# Patient Record
Sex: FEMALE | Race: WHITE | Employment: FULL TIME | URBAN - METROPOLITAN AREA
[De-identification: names, ages, dates, MRNs, and addresses within clinical notes are randomized per-mention and may not be internally consistent; named-entity substitution may affect disease eponyms.]

---

## 2019-02-06 ENCOUNTER — APPOINTMENT (OUTPATIENT)
Dept: CT IMAGING | Age: 34
End: 2019-02-06

## 2019-02-06 ENCOUNTER — HOSPITAL ENCOUNTER (EMERGENCY)
Age: 34
Discharge: HOME | End: 2019-02-06
Attending: EMERGENCY MEDICINE

## 2019-02-06 VITALS
OXYGEN SATURATION: 98 % | HEART RATE: 75 BPM | SYSTOLIC BLOOD PRESSURE: 114 MMHG | RESPIRATION RATE: 16 BRPM | TEMPERATURE: 97.8 F | HEIGHT: 67 IN | WEIGHT: 282 LBS | BODY MASS INDEX: 44.26 KG/M2 | DIASTOLIC BLOOD PRESSURE: 69 MMHG

## 2019-02-06 DIAGNOSIS — R10.32 LEFT GROIN PAIN: Primary | ICD-10-CM

## 2019-02-06 LAB
ANION GAP SERPL CALCULATED.3IONS-SCNC: 10 MMOL/L (ref 7–16)
BACTERIA: ABNORMAL /HPF
BASOPHILS ABSOLUTE: 0.02 E9/L (ref 0–0.2)
BASOPHILS RELATIVE PERCENT: 0.2 % (ref 0–2)
BILIRUBIN URINE: NEGATIVE
BLOOD, URINE: NEGATIVE
BUN BLDV-MCNC: 18 MG/DL (ref 6–20)
CALCIUM SERPL-MCNC: 8.6 MG/DL (ref 8.6–10.2)
CHLORIDE BLD-SCNC: 102 MMOL/L (ref 98–107)
CLARITY: ABNORMAL
CO2: 26 MMOL/L (ref 22–29)
COLOR: YELLOW
CREAT SERPL-MCNC: 0.8 MG/DL (ref 0.5–1)
EOSINOPHILS ABSOLUTE: 0.12 E9/L (ref 0.05–0.5)
EOSINOPHILS RELATIVE PERCENT: 1.3 % (ref 0–6)
EPITHELIAL CELLS, UA: ABNORMAL /HPF
GFR AFRICAN AMERICAN: >60
GFR NON-AFRICAN AMERICAN: >60 ML/MIN/1.73
GLUCOSE BLD-MCNC: 91 MG/DL (ref 74–99)
GLUCOSE URINE: NEGATIVE MG/DL
HCG, URINE, POC: NEGATIVE
HCT VFR BLD CALC: 40.5 % (ref 34–48)
HEMOGLOBIN: 13.6 G/DL (ref 11.5–15.5)
IMMATURE GRANULOCYTES #: 0.02 E9/L
IMMATURE GRANULOCYTES %: 0.2 % (ref 0–5)
KETONES, URINE: NEGATIVE MG/DL
LACTIC ACID: 0.7 MMOL/L (ref 0.5–2.2)
LEUKOCYTE ESTERASE, URINE: NEGATIVE
LYMPHOCYTES ABSOLUTE: 2.25 E9/L (ref 1.5–4)
LYMPHOCYTES RELATIVE PERCENT: 23.9 % (ref 20–42)
Lab: NORMAL
MCH RBC QN AUTO: 29.2 PG (ref 26–35)
MCHC RBC AUTO-ENTMCNC: 33.6 % (ref 32–34.5)
MCV RBC AUTO: 86.9 FL (ref 80–99.9)
MONOCYTES ABSOLUTE: 0.5 E9/L (ref 0.1–0.95)
MONOCYTES RELATIVE PERCENT: 5.3 % (ref 2–12)
MUCUS: PRESENT
NEGATIVE QC PASS/FAIL: NORMAL
NEUTROPHILS ABSOLUTE: 6.52 E9/L (ref 1.8–7.3)
NEUTROPHILS RELATIVE PERCENT: 69.1 % (ref 43–80)
NITRITE, URINE: NEGATIVE
PDW BLD-RTO: 13.1 FL (ref 11.5–15)
PH UA: 6.5 (ref 5–9)
PLATELET # BLD: 269 E9/L (ref 130–450)
PMV BLD AUTO: 9.9 FL (ref 7–12)
POSITIVE QC PASS/FAIL: NORMAL
POTASSIUM SERPL-SCNC: 3.9 MMOL/L (ref 3.5–5)
PROTEIN UA: NEGATIVE MG/DL
RBC # BLD: 4.66 E12/L (ref 3.5–5.5)
RBC UA: ABNORMAL /HPF (ref 0–2)
SODIUM BLD-SCNC: 138 MMOL/L (ref 132–146)
SPECIFIC GRAVITY UA: 1.02 (ref 1–1.03)
UROBILINOGEN, URINE: 1 E.U./DL
WBC # BLD: 9.4 E9/L (ref 4.5–11.5)
WBC UA: ABNORMAL /HPF (ref 0–5)

## 2019-02-06 PROCEDURE — 80048 BASIC METABOLIC PNL TOTAL CA: CPT

## 2019-02-06 PROCEDURE — 81001 URINALYSIS AUTO W/SCOPE: CPT

## 2019-02-06 PROCEDURE — 83605 ASSAY OF LACTIC ACID: CPT

## 2019-02-06 PROCEDURE — 96375 TX/PRO/DX INJ NEW DRUG ADDON: CPT

## 2019-02-06 PROCEDURE — 96374 THER/PROPH/DIAG INJ IV PUSH: CPT

## 2019-02-06 PROCEDURE — 6360000002 HC RX W HCPCS: Performed by: EMERGENCY MEDICINE

## 2019-02-06 PROCEDURE — 36415 COLL VENOUS BLD VENIPUNCTURE: CPT

## 2019-02-06 PROCEDURE — 85025 COMPLETE CBC W/AUTO DIFF WBC: CPT

## 2019-02-06 PROCEDURE — 74176 CT ABD & PELVIS W/O CONTRAST: CPT

## 2019-02-06 PROCEDURE — 99283 EMERGENCY DEPT VISIT LOW MDM: CPT

## 2019-02-06 RX ORDER — SODIUM CHLORIDE 0.9 % (FLUSH) 0.9 %
10 SYRINGE (ML) INJECTION PRN
Status: DISCONTINUED | OUTPATIENT
Start: 2019-02-06 | End: 2019-02-06 | Stop reason: HOSPADM

## 2019-02-06 RX ORDER — NAPROXEN 500 MG/1
500 TABLET ORAL 2 TIMES DAILY
Qty: 28 TABLET | Refills: 0 | Status: SHIPPED | OUTPATIENT
Start: 2019-02-06 | End: 2019-10-25

## 2019-02-06 RX ORDER — ONDANSETRON 2 MG/ML
4 INJECTION INTRAMUSCULAR; INTRAVENOUS
Status: COMPLETED | OUTPATIENT
Start: 2019-02-06 | End: 2019-02-06

## 2019-02-06 RX ORDER — MORPHINE SULFATE 2 MG/ML
8 INJECTION, SOLUTION INTRAMUSCULAR; INTRAVENOUS ONCE
Status: COMPLETED | OUTPATIENT
Start: 2019-02-06 | End: 2019-02-06

## 2019-02-06 RX ADMIN — MORPHINE SULFATE 8 MG: 2 INJECTION, SOLUTION INTRAMUSCULAR; INTRAVENOUS at 14:01

## 2019-02-06 RX ADMIN — ONDANSETRON 4 MG: 2 INJECTION INTRAMUSCULAR; INTRAVENOUS at 14:01

## 2019-02-06 ASSESSMENT — PAIN DESCRIPTION - LOCATION
LOCATION: GROIN;ABDOMEN
LOCATION: GROIN
LOCATION: ABDOMEN

## 2019-02-06 ASSESSMENT — ENCOUNTER SYMPTOMS
DIARRHEA: 0
COLOR CHANGE: 0
CHEST TIGHTNESS: 0
BLOOD IN STOOL: 0
BACK PAIN: 0
ABDOMINAL PAIN: 0
SHORTNESS OF BREATH: 0
VOMITING: 0
COUGH: 0
NAUSEA: 0

## 2019-02-06 ASSESSMENT — PAIN DESCRIPTION - PROGRESSION: CLINICAL_PROGRESSION: GRADUALLY IMPROVING

## 2019-02-06 ASSESSMENT — PAIN SCALES - GENERAL
PAINLEVEL_OUTOF10: 3
PAINLEVEL_OUTOF10: 7
PAINLEVEL_OUTOF10: 7

## 2019-02-06 ASSESSMENT — PAIN DESCRIPTION - DESCRIPTORS
DESCRIPTORS: SHARP
DESCRIPTORS: DULL

## 2019-02-06 ASSESSMENT — PAIN DESCRIPTION - ORIENTATION
ORIENTATION: LEFT
ORIENTATION: LEFT

## 2019-02-06 ASSESSMENT — PAIN DESCRIPTION - FREQUENCY
FREQUENCY: CONTINUOUS
FREQUENCY: CONTINUOUS

## 2019-02-06 ASSESSMENT — PAIN DESCRIPTION - PAIN TYPE
TYPE: ACUTE PAIN
TYPE: ACUTE PAIN

## 2019-06-26 ENCOUNTER — HOSPITAL ENCOUNTER (OUTPATIENT)
Age: 34
Discharge: HOME | End: 2019-06-28

## 2019-06-26 PROCEDURE — 86787 VARICELLA-ZOSTER ANTIBODY: CPT

## 2019-06-26 PROCEDURE — 86762 RUBELLA ANTIBODY: CPT

## 2019-06-26 PROCEDURE — 86765 RUBEOLA ANTIBODY: CPT

## 2019-06-26 PROCEDURE — 86735 MUMPS ANTIBODY: CPT

## 2019-06-27 LAB
MEASLES IMMUNE (IGG): NORMAL
MUMPS AB IGG: NORMAL
RUBELLA ANTIBODY IGG: NORMAL
VARICELLA-ZOSTER VIRUS AB, IGG: NORMAL

## 2019-09-06 ENCOUNTER — OFFICE VISIT (OUTPATIENT)
Dept: FAMILY MEDICINE CLINIC | Age: 34
End: 2019-09-06

## 2019-09-06 VITALS
HEART RATE: 80 BPM | TEMPERATURE: 97.8 F | WEIGHT: 286.25 LBS | HEIGHT: 68 IN | OXYGEN SATURATION: 97 % | DIASTOLIC BLOOD PRESSURE: 76 MMHG | BODY MASS INDEX: 43.38 KG/M2 | SYSTOLIC BLOOD PRESSURE: 118 MMHG

## 2019-09-06 DIAGNOSIS — B96.89 ACUTE BACTERIAL SINUSITIS: Primary | ICD-10-CM

## 2019-09-06 DIAGNOSIS — J01.90 ACUTE BACTERIAL SINUSITIS: Primary | ICD-10-CM

## 2019-09-06 DIAGNOSIS — H66.003 NON-RECURRENT ACUTE SUPPURATIVE OTITIS MEDIA OF BOTH EARS WITHOUT SPONTANEOUS RUPTURE OF TYMPANIC MEMBRANES: ICD-10-CM

## 2019-09-06 DIAGNOSIS — H60.503 ACUTE OTITIS EXTERNA OF BOTH EARS, UNSPECIFIED TYPE: ICD-10-CM

## 2019-09-06 PROCEDURE — 99213 OFFICE O/P EST LOW 20 MIN: CPT | Performed by: FAMILY MEDICINE

## 2019-09-06 RX ORDER — CEFDINIR 300 MG/1
300 CAPSULE ORAL 2 TIMES DAILY
Qty: 20 CAPSULE | Refills: 0 | Status: SHIPPED | OUTPATIENT
Start: 2019-09-06 | End: 2019-09-16

## 2019-09-06 RX ORDER — NEOMYCIN SULFATE, POLYMYXIN B SULFATE AND HYDROCORTISONE 10; 3.5; 1 MG/ML; MG/ML; [USP'U]/ML
SUSPENSION/ DROPS AURICULAR (OTIC)
Qty: 10 ML | Refills: 0 | Status: SHIPPED | OUTPATIENT
Start: 2019-09-06 | End: 2019-12-13

## 2019-09-06 RX ORDER — FLUCONAZOLE 150 MG/1
TABLET ORAL
Qty: 2 TABLET | Refills: 0 | Status: SHIPPED | OUTPATIENT
Start: 2019-09-06 | End: 2019-10-25

## 2019-09-06 NOTE — PROGRESS NOTES
since quittin.6    Smokeless tobacco: Never Used    Tobacco comment: quit smoking 2015   Substance Use Topics    Alcohol use: No    Drug use: No      Social History     Social History Narrative    Not on file        Vitals:    19 0826   BP: 118/76   Pulse: 80   Temp: 97.8 °F (36.6 °C)   SpO2: 97%   Weight: 286 lb 4 oz (129.8 kg)   Height: 5' 8\" (1.727 m)     Wt Readings from Last 3 Encounters:   19 286 lb 4 oz (129.8 kg)   19 282 lb (127.9 kg)   18 272 lb 9.6 oz (123.7 kg)        Physical Exam    Exam:  Const: Appears comfortable. No signs of acute distress present. Head/Face: Atraumatic, normocephalic on inspection. Eyes: No discharge from the eyes. Sclerae clear. ENMT: Ears show fluid erythema bilaterally with ear canal inflation right greater than left, nose boggy oropharynx thick postnasal drainage  Neck: Supple. Palpation reveals no adenopathy. No masses appreciated. No JVD. Resp: Respirations are unlabored. Clear to auscultation bilaterally. No rales, rhonchi or wheezes appreciated over the  lungs bilaterally. CV: RRR   Extremities: No clubbing or cyanosis. No edema of the lower limbs  bilaterally. No calf inflammation or tenderness. Abdomen: Abdomen is soft, nontender, and nondistended. No abdominal masses  appreciated. No palpable hepatosplenomegaly. Bowel sounds are normoactive. Skin: Dry and warm with no rash. Muscular skeletal: No acute joint inflammation. Neuro:Grossly intact without focal deficit        Assessment and Plan:    1. Acute bacterial sinusitis  Counseled. Differential reviewed. Antibiotic as per EMR, standard precautions reviewed including C. Difficile. R/B probiotics reviewed. Mucinex as directed with precautions. Potential interactions reviewed. Proper hydration reviewed. Coolmist vaporizer as directed. Mono precautions reviewed. Counseled on nasal saline. Counseled on nasal steroid. - cefdinir (OMNICEF) 300 MG capsule;  Take 1

## 2019-10-25 ENCOUNTER — HOSPITAL ENCOUNTER (OUTPATIENT)
Age: 34
Discharge: HOME | End: 2019-10-27
Payer: COMMERCIAL

## 2019-10-25 ENCOUNTER — OFFICE VISIT (OUTPATIENT)
Dept: FAMILY MEDICINE CLINIC | Age: 34
End: 2019-10-25
Payer: COMMERCIAL

## 2019-10-25 VITALS
BODY MASS INDEX: 44.89 KG/M2 | SYSTOLIC BLOOD PRESSURE: 102 MMHG | TEMPERATURE: 97.5 F | WEIGHT: 286 LBS | HEART RATE: 68 BPM | HEIGHT: 67 IN | DIASTOLIC BLOOD PRESSURE: 68 MMHG | OXYGEN SATURATION: 97 %

## 2019-10-25 DIAGNOSIS — Z98.84 GASTRIC BYPASS STATUS FOR OBESITY: ICD-10-CM

## 2019-10-25 DIAGNOSIS — G89.29 CHRONIC PAIN OF RIGHT ANKLE: ICD-10-CM

## 2019-10-25 DIAGNOSIS — M19.90 ARTHRITIS: ICD-10-CM

## 2019-10-25 DIAGNOSIS — M25.571 CHRONIC PAIN OF RIGHT ANKLE: ICD-10-CM

## 2019-10-25 DIAGNOSIS — R53.83 FATIGUE, UNSPECIFIED TYPE: ICD-10-CM

## 2019-10-25 DIAGNOSIS — G44.221 CHRONIC TENSION-TYPE HEADACHE, INTRACTABLE: Primary | ICD-10-CM

## 2019-10-25 LAB
ALBUMIN SERPL-MCNC: 4.1 G/DL (ref 3.5–5.2)
ALP BLD-CCNC: 77 U/L (ref 35–104)
ALT SERPL-CCNC: 20 U/L (ref 0–32)
ANION GAP SERPL CALCULATED.3IONS-SCNC: 13 MMOL/L (ref 7–16)
AST SERPL-CCNC: 16 U/L (ref 0–31)
BASOPHILS ABSOLUTE: 0.02 E9/L (ref 0–0.2)
BASOPHILS RELATIVE PERCENT: 0.2 % (ref 0–2)
BILIRUB SERPL-MCNC: 0.6 MG/DL (ref 0–1.2)
BILIRUBIN DIRECT: <0.2 MG/DL (ref 0–0.3)
BILIRUBIN, INDIRECT: NORMAL MG/DL (ref 0–1)
BUN BLDV-MCNC: 20 MG/DL (ref 6–20)
CALCIUM SERPL-MCNC: 8.9 MG/DL (ref 8.6–10.2)
CHLORIDE BLD-SCNC: 103 MMOL/L (ref 98–107)
CHOLESTEROL, TOTAL: 181 MG/DL (ref 0–199)
CO2: 23 MMOL/L (ref 22–29)
CREAT SERPL-MCNC: 0.9 MG/DL (ref 0.5–1)
EOSINOPHILS ABSOLUTE: 0.05 E9/L (ref 0.05–0.5)
EOSINOPHILS RELATIVE PERCENT: 0.6 % (ref 0–6)
FOLATE: 6 NG/ML (ref 4.8–24.2)
GFR AFRICAN AMERICAN: >60
GFR NON-AFRICAN AMERICAN: >60 ML/MIN/1.73
GLUCOSE BLD-MCNC: 89 MG/DL (ref 74–99)
HCT VFR BLD CALC: 42.5 % (ref 34–48)
HDLC SERPL-MCNC: 54 MG/DL
HEMOGLOBIN: 13.7 G/DL (ref 11.5–15.5)
IMMATURE GRANULOCYTES #: 0.02 E9/L
IMMATURE GRANULOCYTES %: 0.2 % (ref 0–5)
LDL CHOLESTEROL CALCULATED: 114 MG/DL (ref 0–99)
LYMPHOCYTES ABSOLUTE: 1.76 E9/L (ref 1.5–4)
LYMPHOCYTES RELATIVE PERCENT: 20 % (ref 20–42)
MCH RBC QN AUTO: 28.5 PG (ref 26–35)
MCHC RBC AUTO-ENTMCNC: 32.2 % (ref 32–34.5)
MCV RBC AUTO: 88.5 FL (ref 80–99.9)
MONOCYTES ABSOLUTE: 0.47 E9/L (ref 0.1–0.95)
MONOCYTES RELATIVE PERCENT: 5.3 % (ref 2–12)
NEUTROPHILS ABSOLUTE: 6.5 E9/L (ref 1.8–7.3)
NEUTROPHILS RELATIVE PERCENT: 73.7 % (ref 43–80)
PDW BLD-RTO: 13.1 FL (ref 11.5–15)
PHOSPHORUS: 2.8 MG/DL (ref 2.5–4.5)
PLATELET # BLD: 253 E9/L (ref 130–450)
PMV BLD AUTO: 10.2 FL (ref 7–12)
POTASSIUM SERPL-SCNC: 3.9 MMOL/L (ref 3.5–5)
RBC # BLD: 4.8 E12/L (ref 3.5–5.5)
SEDIMENTATION RATE, ERYTHROCYTE: 18 MM/HR (ref 0–20)
SODIUM BLD-SCNC: 139 MMOL/L (ref 132–146)
TOTAL PROTEIN: 7.6 G/DL (ref 6.4–8.3)
TRIGL SERPL-MCNC: 64 MG/DL (ref 0–149)
TSH SERPL DL<=0.05 MIU/L-ACNC: 1.17 UIU/ML (ref 0.27–4.2)
VITAMIN B-12: 397 PG/ML (ref 211–946)
VLDLC SERPL CALC-MCNC: 13 MG/DL
WBC # BLD: 8.8 E9/L (ref 4.5–11.5)

## 2019-10-25 PROCEDURE — 80061 LIPID PANEL: CPT

## 2019-10-25 PROCEDURE — 99202 OFFICE O/P NEW SF 15 MIN: CPT | Performed by: INTERNAL MEDICINE

## 2019-10-25 PROCEDURE — 82607 VITAMIN B-12: CPT

## 2019-10-25 PROCEDURE — 85025 COMPLETE CBC W/AUTO DIFF WBC: CPT

## 2019-10-25 PROCEDURE — 84443 ASSAY THYROID STIM HORMONE: CPT

## 2019-10-25 PROCEDURE — 84450 TRANSFERASE (AST) (SGOT): CPT

## 2019-10-25 PROCEDURE — 82746 ASSAY OF FOLIC ACID SERUM: CPT

## 2019-10-25 PROCEDURE — 36415 COLL VENOUS BLD VENIPUNCTURE: CPT

## 2019-10-25 PROCEDURE — 85651 RBC SED RATE NONAUTOMATED: CPT

## 2019-10-25 PROCEDURE — 80069 RENAL FUNCTION PANEL: CPT

## 2019-10-25 PROCEDURE — 84075 ASSAY ALKALINE PHOSPHATASE: CPT

## 2019-10-25 PROCEDURE — 84155 ASSAY OF PROTEIN SERUM: CPT

## 2019-10-25 PROCEDURE — 84460 ALANINE AMINO (ALT) (SGPT): CPT

## 2019-10-25 PROCEDURE — 82247 BILIRUBIN TOTAL: CPT

## 2019-10-25 PROCEDURE — 82248 BILIRUBIN DIRECT: CPT

## 2019-10-25 RX ORDER — SUMATRIPTAN 25 MG/1
25 TABLET, FILM COATED ORAL
Qty: 9 TABLET | Refills: 5 | Status: SHIPPED | OUTPATIENT
Start: 2019-10-25 | End: 2019-12-13 | Stop reason: SDUPTHER

## 2019-10-25 ASSESSMENT — PATIENT HEALTH QUESTIONNAIRE - PHQ9
SUM OF ALL RESPONSES TO PHQ QUESTIONS 1-9: 0
SUM OF ALL RESPONSES TO PHQ QUESTIONS 1-9: 0
2. FEELING DOWN, DEPRESSED OR HOPELESS: 0
SUM OF ALL RESPONSES TO PHQ9 QUESTIONS 1 & 2: 0
1. LITTLE INTEREST OR PLEASURE IN DOING THINGS: 0

## 2019-11-02 ENCOUNTER — OFFICE VISIT (OUTPATIENT)
Dept: FAMILY MEDICINE CLINIC | Age: 34
End: 2019-11-02
Payer: COMMERCIAL

## 2019-11-02 VITALS
HEART RATE: 84 BPM | DIASTOLIC BLOOD PRESSURE: 74 MMHG | TEMPERATURE: 98.6 F | HEIGHT: 68 IN | WEIGHT: 287 LBS | SYSTOLIC BLOOD PRESSURE: 106 MMHG | OXYGEN SATURATION: 97 % | BODY MASS INDEX: 43.5 KG/M2

## 2019-11-02 DIAGNOSIS — H60.501 ACUTE OTITIS EXTERNA OF RIGHT EAR, UNSPECIFIED TYPE: ICD-10-CM

## 2019-11-02 DIAGNOSIS — H66.004 RECURRENT ACUTE SUPPURATIVE OTITIS MEDIA OF RIGHT EAR WITHOUT SPONTANEOUS RUPTURE OF TYMPANIC MEMBRANE: Primary | ICD-10-CM

## 2019-11-02 PROCEDURE — 99213 OFFICE O/P EST LOW 20 MIN: CPT | Performed by: FAMILY MEDICINE

## 2019-11-02 RX ORDER — DOXYCYCLINE HYCLATE 100 MG
100 TABLET ORAL 2 TIMES DAILY
Qty: 20 TABLET | Refills: 0 | Status: SHIPPED | OUTPATIENT
Start: 2019-11-02 | End: 2019-11-12

## 2019-11-02 RX ORDER — METHYLPREDNISOLONE 4 MG/1
TABLET ORAL
Qty: 21 TABLET | Refills: 0 | Status: SHIPPED | OUTPATIENT
Start: 2019-11-02 | End: 2019-11-08

## 2019-11-08 ENCOUNTER — OFFICE VISIT (OUTPATIENT)
Dept: PODIATRY | Age: 34
End: 2019-11-08
Payer: COMMERCIAL

## 2019-11-08 DIAGNOSIS — M65.9 TENOSYNOVITIS OF RIGHT ANKLE: ICD-10-CM

## 2019-11-08 DIAGNOSIS — M76.821 POSTERIOR TIBIAL TENDINITIS, RIGHT: ICD-10-CM

## 2019-11-08 DIAGNOSIS — M25.571 ACUTE RIGHT ANKLE PAIN: Primary | ICD-10-CM

## 2019-11-08 PROCEDURE — 99203 OFFICE O/P NEW LOW 30 MIN: CPT | Performed by: PODIATRIST

## 2019-11-08 PROCEDURE — L1902 AFO ANKLE GAUNTLET PRE OTS: HCPCS | Performed by: PODIATRIST

## 2019-11-08 PROCEDURE — 20605 DRAIN/INJ JOINT/BURSA W/O US: CPT | Performed by: PODIATRIST

## 2019-11-08 RX ORDER — LIDOCAINE HYDROCHLORIDE 10 MG/ML
1 INJECTION, SOLUTION INFILTRATION; PERINEURAL ONCE
Status: COMPLETED | OUTPATIENT
Start: 2019-11-08 | End: 2019-11-08

## 2019-11-08 RX ORDER — METHYLPREDNISOLONE ACETATE 40 MG/ML
40 INJECTION, SUSPENSION INTRA-ARTICULAR; INTRALESIONAL; INTRAMUSCULAR; SOFT TISSUE ONCE
Status: COMPLETED | OUTPATIENT
Start: 2019-11-08 | End: 2019-11-08

## 2019-11-08 RX ORDER — DEXAMETHASONE SODIUM PHOSPHATE 4 MG/ML
4 INJECTION, SOLUTION INTRA-ARTICULAR; INTRALESIONAL; INTRAMUSCULAR; INTRAVENOUS; SOFT TISSUE ONCE
Status: COMPLETED | OUTPATIENT
Start: 2019-11-08 | End: 2019-11-08

## 2019-11-08 RX ADMIN — DEXAMETHASONE SODIUM PHOSPHATE 4 MG: 4 INJECTION, SOLUTION INTRA-ARTICULAR; INTRALESIONAL; INTRAMUSCULAR; INTRAVENOUS; SOFT TISSUE at 11:42

## 2019-11-08 RX ADMIN — METHYLPREDNISOLONE ACETATE 40 MG: 40 INJECTION, SUSPENSION INTRA-ARTICULAR; INTRALESIONAL; INTRAMUSCULAR; SOFT TISSUE at 11:43

## 2019-11-08 RX ADMIN — LIDOCAINE HYDROCHLORIDE 1 ML: 10 INJECTION, SOLUTION INFILTRATION; PERINEURAL at 11:43

## 2019-11-12 ENCOUNTER — OFFICE VISIT (OUTPATIENT)
Dept: FAMILY MEDICINE CLINIC | Age: 34
End: 2019-11-12
Payer: COMMERCIAL

## 2019-11-12 VITALS
BODY MASS INDEX: 44.42 KG/M2 | WEIGHT: 283 LBS | TEMPERATURE: 97.7 F | HEIGHT: 67 IN | HEART RATE: 73 BPM | SYSTOLIC BLOOD PRESSURE: 122 MMHG | OXYGEN SATURATION: 98 % | DIASTOLIC BLOOD PRESSURE: 84 MMHG

## 2019-11-12 DIAGNOSIS — G43.011 INTRACTABLE MIGRAINE WITHOUT AURA AND WITH STATUS MIGRAINOSUS: Primary | ICD-10-CM

## 2019-11-12 DIAGNOSIS — H65.22 LEFT CHRONIC SEROUS OTITIS MEDIA: ICD-10-CM

## 2019-11-12 PROCEDURE — 99213 OFFICE O/P EST LOW 20 MIN: CPT | Performed by: INTERNAL MEDICINE

## 2019-11-12 PROCEDURE — 96372 THER/PROPH/DIAG INJ SC/IM: CPT | Performed by: INTERNAL MEDICINE

## 2019-11-12 RX ORDER — KETOROLAC TROMETHAMINE 30 MG/ML
30 INJECTION, SOLUTION INTRAMUSCULAR; INTRAVENOUS ONCE
Qty: 1 ML | Refills: 0
Start: 2019-11-12 | End: 2019-11-12

## 2019-11-12 RX ORDER — KETOROLAC TROMETHAMINE 30 MG/ML
30 INJECTION, SOLUTION INTRAMUSCULAR; INTRAVENOUS ONCE
Status: COMPLETED | OUTPATIENT
Start: 2019-11-12 | End: 2019-11-12

## 2019-11-12 RX ORDER — METHYLPREDNISOLONE ACETATE 40 MG/ML
40 INJECTION, SUSPENSION INTRA-ARTICULAR; INTRALESIONAL; INTRAMUSCULAR; SOFT TISSUE ONCE
Status: COMPLETED | OUTPATIENT
Start: 2019-11-12 | End: 2019-11-12

## 2019-11-12 RX ORDER — PREDNISONE 10 MG/1
TABLET ORAL
Qty: 30 TABLET | Refills: 0 | Status: SHIPPED | OUTPATIENT
Start: 2019-11-12 | End: 2019-12-13

## 2019-11-12 RX ORDER — METHYLPREDNISOLONE ACETATE 40 MG/ML
40 INJECTION, SUSPENSION INTRA-ARTICULAR; INTRALESIONAL; INTRAMUSCULAR; SOFT TISSUE ONCE
Qty: 1 ML | Refills: 0
Start: 2019-11-12 | End: 2019-11-12

## 2019-11-12 RX ADMIN — KETOROLAC TROMETHAMINE 30 MG: 30 INJECTION, SOLUTION INTRAMUSCULAR; INTRAVENOUS at 10:58

## 2019-11-12 RX ADMIN — METHYLPREDNISOLONE ACETATE 40 MG: 40 INJECTION, SUSPENSION INTRA-ARTICULAR; INTRALESIONAL; INTRAMUSCULAR; SOFT TISSUE at 10:59

## 2019-11-26 ENCOUNTER — TELEPHONE (OUTPATIENT)
Dept: FAMILY MEDICINE CLINIC | Age: 34
End: 2019-11-26

## 2019-11-26 DIAGNOSIS — H65.22 LEFT CHRONIC SEROUS OTITIS MEDIA: Primary | ICD-10-CM

## 2019-12-13 ENCOUNTER — OFFICE VISIT (OUTPATIENT)
Dept: FAMILY MEDICINE CLINIC | Age: 34
End: 2019-12-13
Payer: COMMERCIAL

## 2019-12-13 VITALS
SYSTOLIC BLOOD PRESSURE: 112 MMHG | HEART RATE: 81 BPM | TEMPERATURE: 97.1 F | OXYGEN SATURATION: 99 % | DIASTOLIC BLOOD PRESSURE: 78 MMHG

## 2019-12-13 DIAGNOSIS — G43.011 INTRACTABLE MIGRAINE WITHOUT AURA AND WITH STATUS MIGRAINOSUS: Primary | ICD-10-CM

## 2019-12-13 PROCEDURE — 99213 OFFICE O/P EST LOW 20 MIN: CPT | Performed by: INTERNAL MEDICINE

## 2019-12-13 RX ORDER — SUMATRIPTAN 50 MG/1
25 TABLET, FILM COATED ORAL
Qty: 9 TABLET | Refills: 1 | Status: SHIPPED
Start: 2019-12-13 | End: 2020-02-05

## 2019-12-13 RX ORDER — PROPRANOLOL HYDROCHLORIDE 10 MG/1
10 TABLET ORAL NIGHTLY
Qty: 30 TABLET | Refills: 1 | Status: SHIPPED
Start: 2019-12-13 | End: 2020-02-21

## 2020-01-07 RX ORDER — MULTIVIT-MIN/IRON/FOLIC ACID/K 18-600-40
1 CAPSULE ORAL DAILY
COMMUNITY
End: 2020-07-07

## 2020-01-10 ENCOUNTER — INITIAL CONSULT (OUTPATIENT)
Dept: BARIATRICS/WEIGHT MGMT | Age: 35
End: 2020-01-10
Payer: COMMERCIAL

## 2020-01-10 ENCOUNTER — TELEPHONE (OUTPATIENT)
Dept: BARIATRICS/WEIGHT MGMT | Age: 35
End: 2020-01-10

## 2020-01-10 VITALS
TEMPERATURE: 96.8 F | DIASTOLIC BLOOD PRESSURE: 51 MMHG | RESPIRATION RATE: 20 BRPM | WEIGHT: 290 LBS | SYSTOLIC BLOOD PRESSURE: 112 MMHG | HEIGHT: 67 IN | BODY MASS INDEX: 45.52 KG/M2 | HEART RATE: 75 BPM

## 2020-01-10 PROCEDURE — 99202 OFFICE O/P NEW SF 15 MIN: CPT

## 2020-01-10 PROCEDURE — 99204 OFFICE O/P NEW MOD 45 MIN: CPT | Performed by: SURGERY

## 2020-01-10 NOTE — TELEPHONE ENCOUNTER
Patient is interested in conversion from [address] to [facility]. She has [employer], and it appears she meets their criteria for repeat bariatric surgery. Requirements place on patient's chart. I called [employer], spoke to Deven Levi who confirmed patient's plan DOES cover bariatric surgery. Call reference number [id number]. Patient is scheduled for EGD and labs as ordered by Dr. Debbie Sood, as well as initial dietary consult. She will need to complete 3 months dietary, psych, medical and cardiac clearances, and all other [facility] requirements to proceed. Naga Medeiros explained this all to patient at consult appt. today.

## 2020-01-10 NOTE — PROGRESS NOTES
ear     Past Surgical History  Past Surgical History:   Procedure Laterality Date    APPENDECTOMY  2003     SECTION  2006    CHOLECYSTECTOMY  2009    DILATION AND CURETTAGE OF UTERUS      HYSTEROSCOPY  2015    [facility]   LAPAROSCOPY    [facility]    TUBAL LIGATION  2014    Abalation      Allergies: Ciprofloxacin     Home Medications  Current Outpatient Medications   Medication Sig Dispense Refill    Cholecalciferol (VITAMIN D) 50 MCG ( UT) CAPS capsule Take 1 capsule by mouth daily      Multiple Vitamins-Minerals (MULTIVITAMIN ADULT PO) Take 1 tablet by mouth daily      propranolol (INDERAL) 10 MG tablet Take 1 tablet by mouth nightly 30 tablet 1    SUMAtriptan (IMITREX) 50 MG tablet Take 0.5 tablets by mouth once as needed for Migraine 9 tablet 1    omeprazole (PRILOSEC) 20 MG delayed release capsule Take 20 mg by mouth       No current facility-administered medications for this visit. Social History:   TOBACCO:   reports that she quit smoking about 5 years ago. She started smoking about 27 years ago. She has a 11.00 pack-year smoking history. She has never used smokeless tobacco.  All smokers must join the free smoking cessation program and stop smoking for 3 months before having any Bariatric surgery. ETOH:    reports current alcohol use of about 1.0 standard drinks of alcohol per week. The patient has a family history that is negative for severe cardiovascular or respiratory issues, negative for reaction to anesthesia. Review of Systems    A complete 10 system review was performed and are otherwise negative unless mentioned in the above HPI. Specific negatives are listed below but may not include all those reviewed.     General ROS: negative obtundation, AMS  ENT ROS: negative rhinorrhea, epistaxis  Allergy and Immunology ROS: negative itchy/watery eyes or nasal congestion  Hematological and Lymphatic ROS: negative spontaneous bleeding or bruising  Endocrine ROS: negative  lethargy, mood swings, palpitations or polydipsia/polyuria  Respiratory ROS: negative sputum changes, stridor, tachypnea or wheezing  Cardiovascular ROS: negative for - loss of consciousness, murmur or orthopnea  Gastrointestinal ROS: negative for - hematochezia or hematemesis  Genito-Urinary ROS: negative for -  genital discharge or hematuria  Musculoskeletal ROS: negative for - focal weakness, gangrene  Psych/Neuro ROS: negative for - visual or auditory hallucinations, suicidal ideation      Physical Exam:   VITALS: BP (!) 112/51 (Site: Left Upper Arm, Position: Sitting, Cuff Size: Large Adult)   Pulse 75   Temp 96.8 °F (36 °C) (Temporal)   Resp 20   Ht 5' 7\" (1.702 m)   Wt 290 lb (131.5 kg)   BMI 45.42 kg/m²   General appearance: AAO, NAD  Eyes: PERRL, EOMI, red conjunctiva  Lungs: equal chest rise bilateral, no wheeze, no rhonchi  Chest wall: atraumatic, no tenderness, no echymosis or abrasions  Heart: reg rate, no murmur  Abdomen: soft, nondistended, nontender  Extremities: full ROM all 4 ext, no gross motor or sensory deficits  Pulses: 2+ distal  Skin: warm and dry  Neurologic: spontanous eye opening, purposeful, follows complex commands      Assessment:  Morbid obesity with inability to keep the weight off with diet and exercise. She is interested in Laparoscopic Maty-en- Y Gastric Bypass or Sleeve Gastrectomy. We discussed that our goal is to ameliorate the medical problems and not to obtain a specific body mass index. She understands the risks and benefits, and wishes to proceed with the evaluation. Plan:  Psych evaluation, medical and cardio/pulmonary clearance, mammogram, pap test, gallbladder is surgically absent. These patients often have vitamin deficiencies so I will do screening labs for malnutrition. I will do an upper endoscopy to check for hiatal hernias, ulcers and H. Pylori while we wait for insurance approval for the surgery.     I have spent over

## 2020-01-10 NOTE — PROGRESS NOTES
Patient has tried the following programs to lose weight in the past, but none have yielded substantial, long-term success:    No Atkins   No Herbal Life    No Forestine Jointer  No [place] Weight Loss    No Liquid protein fast  No Medifast    No Optifast   No Overeaters Anonymous    No Gunner Montes  No Slim Fast    No Dora Cuello  No TOPS    Yes [facility]chers  No Dexatrim    No Redux   No Fenfluramine    No Meridia   No Phentermine    No Xenical   Yes Physician Supervised    Yes Self-Imposed      Other:      Total number of years dieting: Dieting on and off lifelong

## 2020-01-14 ENCOUNTER — TELEPHONE (OUTPATIENT)
Dept: ADMINISTRATIVE | Age: 35
End: 2020-01-14

## 2020-01-14 ENCOUNTER — PROCEDURE VISIT (OUTPATIENT)
Dept: AUDIOLOGY | Age: 35
End: 2020-01-14
Payer: COMMERCIAL

## 2020-01-14 ENCOUNTER — OFFICE VISIT (OUTPATIENT)
Dept: ENT CLINIC | Age: 35
End: 2020-01-14
Payer: COMMERCIAL

## 2020-01-14 VITALS
BODY MASS INDEX: 45.52 KG/M2 | SYSTOLIC BLOOD PRESSURE: 116 MMHG | HEIGHT: 67 IN | HEART RATE: 68 BPM | WEIGHT: 290 LBS | OXYGEN SATURATION: 98 % | DIASTOLIC BLOOD PRESSURE: 73 MMHG

## 2020-01-14 PROCEDURE — 92557 COMPREHENSIVE HEARING TEST: CPT | Performed by: AUDIOLOGIST

## 2020-01-14 PROCEDURE — 99204 OFFICE O/P NEW MOD 45 MIN: CPT | Performed by: OTOLARYNGOLOGY

## 2020-01-14 PROCEDURE — 92567 TYMPANOMETRY: CPT | Performed by: AUDIOLOGIST

## 2020-01-14 ASSESSMENT — ENCOUNTER SYMPTOMS
TROUBLE SWALLOWING: 0
EYES NEGATIVE: 1
RESPIRATORY NEGATIVE: 1
RHINORRHEA: 1

## 2020-01-14 NOTE — TELEPHONE ENCOUNTER
New patient packet mailed. Dr. Yulia Cornell and Dr. Desirae Panchal notes are in [facility]

## 2020-01-14 NOTE — PROGRESS NOTES
Subjective:      Patient ID:  Chelsey Alba is a 29 y.o. female. HPI:    Patient presents today with a moderate problem of the bilateral ear. It started 8 months ago. Patient states that nothing makes it better and nothing makes it worse. Patient states has recurrent ear infections, treated with antibiotics and steroids intermittently with minimal relief. Has hx of multiple ear infections, without tubes,as a child. Family hx of cholestoma R in mother and grandfather, concerned and presents for evaluation. Associated symptoms: nasal congestion, rhinorrhea, post nasal drip, R TMJ. Seasonal Allergies: yes, Fall        Treatment: daily Claritin, Flonase with good control      Pain: yes   Side: bilateral  Drainage:  no     Trauma history to the ear: none    Hearing Aids: no    History of Headtrauma: no   Description: none  History of surgery to the head/neck: no   Description: none  History of cerumenimpaction: no  History of noise exposure: yes   Type: machinery   Spinning: yes- occasionally    Length of time: seconds  Hearing loss: no    Fluctuating: no  Aural pressure: yes  Tinnitus: yes- otalgia  Otalgia:yes        Patient's medications, allergies, past medical, surgical, social and family histories were reviewed andupdated as appropriate. Review of Systems   Constitutional: Negative. HENT: Positive for ear pain, postnasal drip and rhinorrhea. Negative for ear discharge, hearing loss, nosebleeds and trouble swallowing. Eyes: Negative. Respiratory: Negative. Endocrine: Negative. Skin: Negative. Allergic/Immunologic: Positive for environmental allergies. Neurological: Negative. Objective:   Physical Exam  Constitutional:       Appearance: Normal appearance. HENT:      Head: Normocephalic. Right Ear: Tympanic membrane, ear canal and external ear normal. No drainage or swelling. No middle ear effusion. There is no impacted cerumen.  Tympanic membrane is not retracted. Left Ear: Tympanic membrane, ear canal and external ear normal. No drainage or swelling. No middle ear effusion. There is no impacted cerumen. Tympanic membrane is not retracted. Nose: Nasal deformity (R septal spur), septal deviation, congestion and rhinorrhea present. Rhinorrhea is clear. Right Turbinates: Swollen. Left Turbinates: Swollen. Mouth/Throat:      Mouth: Mucous membranes are moist.      Tongue: No lesions. Palate: No mass. Pharynx: Oropharynx is clear. Uvula midline. Eyes:      Extraocular Movements: Extraocular movements intact. Pupils: Pupils are equal, round, and reactive to light. Neck:      Musculoskeletal: Normal range of motion. Cardiovascular:      Rate and Rhythm: Normal rate. Pulmonary:      Effort: Pulmonary effort is normal.   Skin:     Capillary Refill: Capillary refill takes less than 2 seconds. Neurological:      General: No focal deficit present. Mental Status: She is alert. Assessment:          Diagnosis Orders   1. Dislocation of temporomandibular joint, initial encounter                Plan:      28 y/o female with b/l otalgia R > L for 8 months. On exam, R TMJ crepitus with tenderness with ROM of jaw, b/l ears intact no evidence of infections, swelling. Otalgia likely due to TMJ. TMJ  Discussed causes of TMJ and the management of it. Patient is to start a soft diet for the next week with warm compresses to the area and OTC NSAIDS. Discussed TMJ physical therapy and referral to dentistry. Ears looks well today, could have had otitis externa in the past. Discussed keeping ear dry. Follow up in 4 month(s)                      Zac Deckerr  1985    I have discussed the case, including pertinent history and exam findings with the resident. I have seen and examined the patient and the key elements of the encounter have been performed by me.  I agree with the assessment, plan and orders as documented by the  resident              Remainder of medical problems as per  resident note. Patient seen and examined. Agree with above exam, assessment and plan.       Electronically signed by Sarmad Bearden DO on 1/14/20 at 1:18 PM

## 2020-01-14 NOTE — PROGRESS NOTES
This patient was referred for audiometric/tympanometric testing by Dr. Ken William due to ear pain. The patient reported muffled hearing. Audiometry revealed hearing sensitivity within normal limits, bilaterally. Reliability was good. Speech reception thresholds were in good agreement with the pure tone averages, bilaterally. Speech discrimination scores were excellent, bilaterally. Tympanometry revealed normal middle ear peak pressure and compliance, bilaterally. The results were reviewed with the patient. Recommendations for follow up will be made pending physician consult.     Kendra Parham

## 2020-01-22 NOTE — PROGRESS NOTES
makeup) or nail polish on your fingers or toes. 11. DO NOT wear any jewelry or piercings on day of surgery. All body piercing jewelry must be removed. 12. Shower the night before surgery with ___Antibacterial soap     13. TOTAL JOINT REPLACEMENT/HYSTERECTOMY PATIENTS ONLY---Remember to bring Blood Bank bracelet to the hospital on the day of surgery. 14. If you have a Living Will and Durable Power of  for Healthcare, please bring in a copy. 15. If appropriate bring crutches, inspirex, WALKER, CANE etc... 12. Notify your Surgeon if you develop any illness between now and surgery time, cough, cold, fever, sore throat, nausea, vomiting, etc.  Please notify your surgeon if you experience dizziness, shortness of breath or blurred vision between now & the time of your surgery. 17. If you have ___dentures, they will be removed before going to the OR; we will provide you a container. If you wear ___contact lenses or ___glasses, they will be removed; please bring a case for them. 18. To provide excellent care visitors will be limited to 2 in the room at any given time. 19. Please bring picture ID and insurance card. 20. Sleep apnea patients need to bring CPAP AND SETTINGS to hospital on day of surgery. 21. During flu season no children under the age of 15 are permitted in the hospital for the safety of all patients. 22. Other                 Please call AMBULATORY CARE if you have any further questions.    [facility]     [address]

## 2020-01-23 ENCOUNTER — ANESTHESIA EVENT (OUTPATIENT)
Dept: ENDOSCOPY | Age: 35
End: 2020-01-23
Payer: COMMERCIAL

## 2020-01-23 ENCOUNTER — ANESTHESIA (OUTPATIENT)
Dept: ENDOSCOPY | Age: 35
End: 2020-01-23
Payer: COMMERCIAL

## 2020-01-23 ENCOUNTER — HOSPITAL ENCOUNTER (OUTPATIENT)
Age: 35
Setting detail: OUTPATIENT SURGERY
Discharge: HOME | End: 2020-01-23
Attending: SURGERY | Admitting: SURGERY
Payer: COMMERCIAL

## 2020-01-23 VITALS
HEIGHT: 67 IN | RESPIRATION RATE: 16 BRPM | OXYGEN SATURATION: 98 % | SYSTOLIC BLOOD PRESSURE: 98 MMHG | BODY MASS INDEX: 45.78 KG/M2 | TEMPERATURE: 97.6 F | DIASTOLIC BLOOD PRESSURE: 58 MMHG | WEIGHT: 291.7 LBS | HEART RATE: 75 BPM

## 2020-01-23 VITALS
OXYGEN SATURATION: 85 % | SYSTOLIC BLOOD PRESSURE: 91 MMHG | DIASTOLIC BLOOD PRESSURE: 44 MMHG | RESPIRATION RATE: 21 BRPM

## 2020-01-23 LAB
ALBUMIN SERPL-MCNC: 4.1 G/DL (ref 3.5–5.2)
ALP BLD-CCNC: 69 U/L (ref 35–104)
ALT SERPL-CCNC: 21 U/L (ref 0–32)
ANION GAP SERPL CALCULATED.3IONS-SCNC: 12 MMOL/L (ref 7–16)
AST SERPL-CCNC: 19 U/L (ref 0–31)
BILIRUB SERPL-MCNC: 0.4 MG/DL (ref 0–1.2)
BUN BLDV-MCNC: 15 MG/DL (ref 6–20)
CALCIUM SERPL-MCNC: 8.8 MG/DL (ref 8.6–10.2)
CHLORIDE BLD-SCNC: 101 MMOL/L (ref 98–107)
CHOLESTEROL, TOTAL: 197 MG/DL (ref 0–199)
CO2: 26 MMOL/L (ref 22–29)
CREAT SERPL-MCNC: 0.8 MG/DL (ref 0.5–1)
FERRITIN: 45 NG/ML
FOLATE: 7.8 NG/ML (ref 4.8–24.2)
GFR AFRICAN AMERICAN: >60
GFR NON-AFRICAN AMERICAN: >60 ML/MIN/1.73
GLUCOSE BLD-MCNC: 97 MG/DL (ref 74–99)
HBA1C MFR BLD: 5.1 % (ref 4–5.6)
HCG(URINE) PREGNANCY TEST: NEGATIVE
HCT VFR BLD CALC: 42 % (ref 34–48)
HEMOGLOBIN: 13.6 G/DL (ref 11.5–15.5)
MCH RBC QN AUTO: 29.1 PG (ref 26–35)
MCHC RBC AUTO-ENTMCNC: 32.4 % (ref 32–34.5)
MCV RBC AUTO: 89.7 FL (ref 80–99.9)
PDW BLD-RTO: 12.8 FL (ref 11.5–15)
PLATELET # BLD: 256 E9/L (ref 130–450)
PMV BLD AUTO: 9.4 FL (ref 7–12)
POTASSIUM SERPL-SCNC: 4.1 MMOL/L (ref 3.5–5)
RBC # BLD: 4.68 E12/L (ref 3.5–5.5)
SODIUM BLD-SCNC: 139 MMOL/L (ref 132–146)
TOTAL PROTEIN: 7.4 G/DL (ref 6.4–8.3)
TRIGL SERPL-MCNC: 67 MG/DL (ref 0–149)
VITAMIN B-12: 360 PG/ML (ref 211–946)
VITAMIN D 25-HYDROXY: 11 NG/ML (ref 30–100)
WBC # BLD: 7 E9/L (ref 4.5–11.5)

## 2020-01-23 PROCEDURE — 43239 EGD BIOPSY SINGLE/MULTIPLE: CPT | Performed by: SURGERY

## 2020-01-23 PROCEDURE — 84630 ASSAY OF ZINC: CPT

## 2020-01-23 PROCEDURE — 81025 URINE PREGNANCY TEST: CPT

## 2020-01-23 PROCEDURE — 7100000010 HC PHASE II RECOVERY - FIRST 15 MIN: Performed by: SURGERY

## 2020-01-23 PROCEDURE — 2580000003 HC RX 258: Performed by: SURGERY

## 2020-01-23 PROCEDURE — 3700000001 HC ADD 15 MINUTES (ANESTHESIA): Performed by: SURGERY

## 2020-01-23 PROCEDURE — 82746 ASSAY OF FOLIC ACID SERUM: CPT

## 2020-01-23 PROCEDURE — 84478 ASSAY OF TRIGLYCERIDES: CPT

## 2020-01-23 PROCEDURE — 2580000003 HC RX 258: Performed by: ANESTHESIOLOGY

## 2020-01-23 PROCEDURE — 3700000000 HC ANESTHESIA ATTENDED CARE: Performed by: SURGERY

## 2020-01-23 PROCEDURE — 82465 ASSAY BLD/SERUM CHOLESTEROL: CPT

## 2020-01-23 PROCEDURE — 82607 VITAMIN B-12: CPT

## 2020-01-23 PROCEDURE — 83036 HEMOGLOBIN GLYCOSYLATED A1C: CPT

## 2020-01-23 PROCEDURE — 2709999900 HC NON-CHARGEABLE SUPPLY: Performed by: SURGERY

## 2020-01-23 PROCEDURE — 80053 COMPREHEN METABOLIC PANEL: CPT

## 2020-01-23 PROCEDURE — 3609012400 HC EGD TRANSORAL BIOPSY SINGLE/MULTIPLE: Performed by: SURGERY

## 2020-01-23 PROCEDURE — 85027 COMPLETE CBC AUTOMATED: CPT

## 2020-01-23 PROCEDURE — 36415 COLL VENOUS BLD VENIPUNCTURE: CPT

## 2020-01-23 PROCEDURE — 88305 TISSUE EXAM BY PATHOLOGIST: CPT

## 2020-01-23 PROCEDURE — 6360000002 HC RX W HCPCS: Performed by: NURSE ANESTHETIST, CERTIFIED REGISTERED

## 2020-01-23 PROCEDURE — 7100000011 HC PHASE II RECOVERY - ADDTL 15 MIN: Performed by: SURGERY

## 2020-01-23 PROCEDURE — 82306 VITAMIN D 25 HYDROXY: CPT

## 2020-01-23 PROCEDURE — 82728 ASSAY OF FERRITIN: CPT

## 2020-01-23 PROCEDURE — 84425 ASSAY OF VITAMIN B-1: CPT

## 2020-01-23 RX ORDER — SODIUM CHLORIDE 9 MG/ML
INJECTION, SOLUTION INTRAVENOUS CONTINUOUS
Status: DISCONTINUED | OUTPATIENT
Start: 2020-01-23 | End: 2020-01-23 | Stop reason: HOSPADM

## 2020-01-23 RX ORDER — SODIUM CHLORIDE 0.9 % (FLUSH) 0.9 %
10 SYRINGE (ML) INJECTION PRN
Status: DISCONTINUED | OUTPATIENT
Start: 2020-01-23 | End: 2020-01-23 | Stop reason: HOSPADM

## 2020-01-23 RX ORDER — SODIUM CHLORIDE, SODIUM LACTATE, POTASSIUM CHLORIDE, CALCIUM CHLORIDE 600; 310; 30; 20 MG/100ML; MG/100ML; MG/100ML; MG/100ML
INJECTION, SOLUTION INTRAVENOUS CONTINUOUS
Status: DISCONTINUED | OUTPATIENT
Start: 2020-01-23 | End: 2020-01-23 | Stop reason: HOSPADM

## 2020-01-23 RX ORDER — SODIUM CHLORIDE 0.9 % (FLUSH) 0.9 %
10 SYRINGE (ML) INJECTION EVERY 12 HOURS SCHEDULED
Status: DISCONTINUED | OUTPATIENT
Start: 2020-01-23 | End: 2020-01-23 | Stop reason: HOSPADM

## 2020-01-23 RX ORDER — PROPOFOL 10 MG/ML
INJECTION, EMULSION INTRAVENOUS PRN
Status: DISCONTINUED | OUTPATIENT
Start: 2020-01-23 | End: 2020-01-23 | Stop reason: SDUPTHER

## 2020-01-23 RX ADMIN — SODIUM CHLORIDE: 9 INJECTION, SOLUTION INTRAVENOUS at 09:32

## 2020-01-23 RX ADMIN — SODIUM CHLORIDE, POTASSIUM CHLORIDE, SODIUM LACTATE AND CALCIUM CHLORIDE: 600; 310; 30; 20 INJECTION, SOLUTION INTRAVENOUS at 10:09

## 2020-01-23 RX ADMIN — PROPOFOL 230 MG: 10 INJECTION, EMULSION INTRAVENOUS at 10:16

## 2020-01-23 ASSESSMENT — PAIN SCALES - GENERAL: PAINLEVEL_OUTOF10: 0

## 2020-01-23 ASSESSMENT — PAIN - FUNCTIONAL ASSESSMENT: PAIN_FUNCTIONAL_ASSESSMENT: 0-10

## 2020-01-23 NOTE — ANESTHESIA PRE PROCEDURE
dropped    Arthritis     Chronic sinusitis     Constipation     Depression with anxiety     Earache     Migraines     Morbidly obese (HCC)     Polycystic ovarian syndrome     RLS (restless legs syndrome)     Swelling of both lower extremities        Past Surgical History:        Procedure Laterality Date    APPENDECTOMY  2003     SECTION  2006    CHOLECYSTECTOMY  2009    DILATION AND CURETTAGE OF UTERUS      HYSTEROSCOPY  2015    [facility]   LAPAROSCOPY    [facility]    [facility]    TUBAL LIGATION  2014    Abalation       Social History:    Social History     Tobacco Use    Smoking status: Former Smoker     Packs/day: 0.50     Years: 22.00     Pack years: 11.00     Start date:      Last attempt to quit: 2015     Years since quittin.0    Smokeless tobacco: Never Used    Tobacco comment: quit smoking 2015   Substance Use Topics    Alcohol use:  Yes     Alcohol/week: 1.0 standard drinks     Types: 1 Glasses of wine per week     Comment: monthly                                Counseling given: Not Answered  Comment: quit smoking 2015      Vital Signs (Current):   Vitals:    20 1352 20 0921   BP:  (!) 110/56   Pulse:  65   Resp:  18   Temp:  97.6 °F (36.4 °C)   TempSrc:  Temporal   SpO2:  98%   Weight: 290 lb (131.5 kg) 291 lb 11.2 oz (132.3 kg)   Height: 5' 7\" (1.702 m) 5' 7\" (1.702 m)                                              BP Readings from Last 3 Encounters:   20 (!) 110/56   20 116/73   01/10/20 (!) 112/51       NPO Status: Time of last liquid consumption: 2300                        Time of last solid consumption:                         Date of last liquid consumption: 20                        Date of last solid food consumption: 20    BMI:   Wt Readings from Last 3 Encounters:   20 291 lb 11.2 oz (132.3 kg)   20 290 lb (131.5 kg)   01/10/20 290 lb (131.5 kg)     Body mass index is 45.69 kg/m².    CBC:   Lab Results   Component Value Date    WBC 7.0 01/23/2020    RBC 4.68 01/23/2020    HGB 13.6 01/23/2020    HCT 42.0 01/23/2020    MCV 89.7 01/23/2020    RDW 12.8 01/23/2020     01/23/2020       CMP:   Lab Results   Component Value Date     01/23/2020    K 4.1 01/23/2020     01/23/2020    CO2 26 01/23/2020    BUN 15 01/23/2020    CREATININE 0.8 01/23/2020    GFRAA >60 01/23/2020    LABGLOM >60 01/23/2020    GLUCOSE 97 01/23/2020    PROT 7.4 01/23/2020    CALCIUM 8.8 01/23/2020    BILITOT 0.4 01/23/2020    ALKPHOS 69 01/23/2020    AST 19 01/23/2020    ALT 21 01/23/2020       POC Tests: No results for input(s): POCGLU, POCNA, POCK, POCCL, POCBUN, POCHEMO, POCHCT in the last 72 hours. Coags: No results found for: PROTIME, INR, APTT    HCG (If Applicable):   Lab Results   Component Value Date    PREGTESTUR NEGATIVE 01/23/2020        ABGs: No results found for: PHART, PO2ART, SVV3BVV, BML9RDZ, BEART, G5NXDBRI     Type & Screen (If Applicable):  No results found for: LABABO, LABRH    Anesthesia Evaluation    Airway: Mallampati: I  TM distance: >3 FB   Neck ROM: full  Mouth opening: > = 3 FB Dental:          Pulmonary:Negative Pulmonary ROS breath sounds clear to auscultation                             Cardiovascular:Negative CV ROS            Rhythm: regular  Rate: normal                    Neuro/Psych:   (+) headaches: cluster headaches,             GI/Hepatic/Renal:   (+) morbid obesity          Endo/Other: Negative Endo/Other ROS                    Abdominal:   (+) obese,         Vascular: negative vascular ROS. Anesthesia Plan      MAC     ASA 3       Induction: intravenous. Anesthetic plan and risks discussed with patient. Plan discussed with CRNA.                   Ravinder Parks MD   1/23/2020

## 2020-01-23 NOTE — ANESTHESIA POSTPROCEDURE EVALUATION
Department of Anesthesiology  Postprocedure Note    Patient: Kapil Lemon  MRN: [id number]  YOB: 1985  Date of evaluation: 1/23/2020  Time:  10:52 AM     Procedure Summary     Date:  01/23/20 Room / Location:  [address] [place] [address] / [address] [place], [place] [address]    Anesthesia Start:  1009 Anesthesia Stop:  [phone]    Procedure:  EGD BIOPSY (N/A ) Diagnosis:  (GERD)    Surgeon:  Art Gallardo MD Responsible Provider:  Diane Mayes MD    Anesthesia Type:  MAC ASA Status:  3          Anesthesia Type: MAC    Nieves Phase I: Nieves Score: 10    Nieves Phase II: Nieves Score: 10    Last vitals: Reviewed and per EMR flowsheets.        Anesthesia Post Evaluation    Patient location during evaluation: PACU  Patient participation: complete - patient participated  Level of consciousness: awake  Airway patency: patent  Nausea & Vomiting: no nausea and no vomiting  Complications: no  Cardiovascular status: hemodynamically stable  Respiratory status: acceptable  Hydration status: euvolemic

## 2020-01-23 NOTE — H&P
Bharath CLARK Randall-Covert  1/23/2020  [facility]    Initial Evaluation  Bariatric Surgery and EGD       Subjective:  CHIEF COMPLAINT: Morbid obesity, malnutrition, Dyspnea on Exertion, GERD, Degenerative Joint Disease (DJD), Stress Urinary Incontinence and History of Cholelithiasis    HISTORY OF PRESENT ILLNESS: Ronna Ryan is a morbidly-obese 29 y.o.  female, who weighs 290 lb (131.5 kg). She is 127 pounds over her ideal body weight. The severity is severe with Body mass index is 45.69 kg/m². She has multiple medical problems aggravated by her obesity. They have been overweight since age 15. She wishes to have bariatric surgery so that she can lose a large amount of weight and keep the weight off. I have met with her in the [facility] where we discussed the surgery in great detail and went over the risks and benefits. She has watched our informational video so she understands all of the extensive risks involved. She states that she understands all of the risks and wishes to proceed with the evaluation. She has had previous lap sleeve gastrectomy in [facility] 2017. She lost 112lbs from 376 to 268 and has regained 12lbs since. She is not counting her protein grams. She does not exercise. She states she has severe reflux ever since her sleeve was done. No regurgitation. States she had reflux prior to her last sleeve surgery but elected for sleeve anyways. She takes omeprazole daily now with no benefit. She sleeps on 3 pillows with no benefit either. She feels worse in AM due to lying flat. She feels better after taking her omeprazole. We discussed options regarding LINX procedure as well for reflux but she needs MRI on every 3 yr basis due to hx Budd Chiari malformations.     Past Medical History  Patient Active Problem List   Diagnosis    Recurrent acute suppurative otitis media of right ear without spontaneous rupture of tympanic membrane    Acute otitis externa of right ear     Past Surgical History  Past Surgical History:   Procedure Laterality Date    APPENDECTOMY  2003     SECTION  2006    CHOLECYSTECTOMY  2009    DILATION AND CURETTAGE OF UTERUS  2013    HYSTEROSCOPY  2015    [facility]   LAPAROSCOPY    [facility]    TUBAL LIGATION  2014    [facility]al[facility]      Allergies: Ciprofloxacin     Home Medications  Current Facility-Administered Medications   Medication Dose Route Frequency Provider Last Rate Last Dose    lactated ringers infusion   Intravenous Continuous Gena Pena MD        0.9 % sodium chloride infusion   Intravenous Continuous Yousuf Leung  mL/hr at 20 0932      sodium chloride flush 0.9 % injection 10 mL  10 mL Intravenous 2 times per day Yousuf Leung MD        sodium chloride flush 0.9 % injection 10 mL  10 mL Intravenous PRN Yousuf Leung MD           Social History:   TOBACCO:   reports that she quit smoking about 5 years ago. She started smoking about 27 years ago. She has a 11.00 pack-year smoking history. She has never used smokeless tobacco.  All smokers must join the free smoking cessation program and stop smoking for 3 months before having any Bariatric surgery. ETOH:    reports current alcohol use of about 1.0 standard drinks of alcohol per week. The patient has a family history that is negative for severe cardiovascular or respiratory issues, negative for reaction to anesthesia. Review of Systems    A complete 10 system review was performed and are otherwise negative unless mentioned in the above HPI. Specific negatives are listed below but may not include all those reviewed.     General ROS: negative obtundation, AMS  ENT ROS: negative rhinorrhea, epistaxis  Allergy and Immunology ROS: negative itchy/watery eyes or nasal congestion  Hematological and Lymphatic ROS: negative spontaneous bleeding or bruising  Endocrine ROS: negative  lethargy, mood swings, palpitations or polydipsia/polyuria  Respiratory ROS: negative sputum changes, stridor, tachypnea or wheezing  Cardiovascular ROS: negative for - loss of consciousness, murmur or orthopnea  Gastrointestinal ROS: negative for - hematochezia or hematemesis  Genito-Urinary ROS: negative for -  genital discharge or hematuria  Musculoskeletal ROS: negative for - focal weakness, gangrene  Psych/Neuro ROS: negative for - visual or auditory hallucinations, suicidal ideation      Physical Exam:   VITALS: BP (!) 110/56   Pulse 65   Temp 97.6 °F (36.4 °C) (Temporal)   Resp 18   Ht 5' 7\" (1.702 m)   Wt 291 lb 11.2 oz (132.3 kg)   SpO2 98%   BMI 45.69 kg/m²   General appearance: AAO, NAD  Eyes: PERRL, EOMI, red conjunctiva  Lungs: equal chest rise bilateral, no wheeze, no rhonchi  Chest wall: atraumatic, no tenderness, no echymosis or abrasions  Heart: reg rate, no murmur  Abdomen: soft, nondistended, nontender  Extremities: full ROM all 4 ext, no gross motor or sensory deficits  Pulses: 2+ distal  Skin: warm and dry  Neurologic: spontanous eye opening, purposeful, follows complex commands      Assessment:  Morbid obesity with inability to keep the weight off with diet and exercise. She is interested in Laparoscopic Maty-en- Y Gastric Bypass or Sleeve Gastrectomy. We discussed that our goal is to ameliorate the medical problems and not to obtain a specific body mass index. She understands the risks and benefits, and wishes to proceed with the evaluation. Plan:  Psych evaluation, medical and cardio/pulmonary clearance, mammogram, pap test, gallbladder is surgically absent. These patients often have vitamin deficiencies so I will do screening labs for malnutrition. I will do an upper endoscopy to check for hiatal hernias, ulcers and H. Pylori while we wait for insurance approval for the surgery.     I have spent over 45min in evaluation of the patient including greater than 50% of that time face to face discussing surgical options, medical and surgical history, plans for medical weight loss management and preoperative clearance for surgery. They did have family present for the discussion and visual aides and drawings were used to explain anatomy and surgical procedures. Physician Signature: Electronically signed by Dr. Sharita Sanchez name on file.     Send copy of H&P to PCP, Darcy Oh,

## 2020-01-24 ENCOUNTER — HOSPITAL ENCOUNTER (OUTPATIENT)
Age: 35
Discharge: HOME | End: 2020-01-26
Payer: COMMERCIAL

## 2020-01-24 ENCOUNTER — OFFICE VISIT (OUTPATIENT)
Dept: NEUROLOGY | Age: 35
End: 2020-01-24
Payer: COMMERCIAL

## 2020-01-24 VITALS
HEIGHT: 67 IN | BODY MASS INDEX: 45.99 KG/M2 | DIASTOLIC BLOOD PRESSURE: 80 MMHG | SYSTOLIC BLOOD PRESSURE: 104 MMHG | WEIGHT: 293 LBS

## 2020-01-24 PROBLEM — G43.909 MIGRAINE HEADACHE: Chronic | Status: ACTIVE | Noted: 2020-01-24

## 2020-01-24 PROBLEM — E53.8 FOLIC ACID DEFICIENCY: Status: ACTIVE | Noted: 2020-01-24

## 2020-01-24 PROBLEM — E55.9 VITAMIN D DEFICIENCY: Chronic | Status: ACTIVE | Noted: 2020-01-24

## 2020-01-24 PROBLEM — E28.2 PCOS (POLYCYSTIC OVARIAN SYNDROME): Status: ACTIVE | Noted: 2020-01-24

## 2020-01-24 PROBLEM — G44.221 CHRONIC TENSION-TYPE HEADACHE, INTRACTABLE: Status: ACTIVE | Noted: 2020-01-24

## 2020-01-24 LAB
AMMONIA: <10 UMOL/L (ref 11–51)
C-REACTIVE PROTEIN: 0.2 MG/DL (ref 0–0.4)
MAGNESIUM: 2.1 MG/DL (ref 1.6–2.6)
SEDIMENTATION RATE, ERYTHROCYTE: 15 MM/HR (ref 0–20)

## 2020-01-24 PROCEDURE — 83735 ASSAY OF MAGNESIUM: CPT

## 2020-01-24 PROCEDURE — 86803 HEPATITIS C AB TEST: CPT

## 2020-01-24 PROCEDURE — 85651 RBC SED RATE NONAUTOMATED: CPT

## 2020-01-24 PROCEDURE — 86140 C-REACTIVE PROTEIN: CPT

## 2020-01-24 PROCEDURE — 36415 COLL VENOUS BLD VENIPUNCTURE: CPT

## 2020-01-24 PROCEDURE — 82140 ASSAY OF AMMONIA: CPT

## 2020-01-24 PROCEDURE — 99204 OFFICE O/P NEW MOD 45 MIN: CPT | Performed by: PSYCHIATRY & NEUROLOGY

## 2020-01-24 RX ORDER — PROPRANOLOL HYDROCHLORIDE 10 MG/1
TABLET ORAL
Qty: 186 TABLET | Refills: 5 | Status: SHIPPED
Start: 2020-01-24 | End: 2021-01-20 | Stop reason: SDUPTHER

## 2020-01-24 ASSESSMENT — ENCOUNTER SYMPTOMS
ALLERGIC/IMMUNOLOGIC NEGATIVE: 1
GASTROINTESTINAL NEGATIVE: 1
EYES NEGATIVE: 1

## 2020-01-24 NOTE — PROGRESS NOTES
Neurology Consult Note:    Patient: Kapil Lemon  : 1985  Date: 20  Referring provider: Darin Irvin, *    Referral to Neurology: Migraine without aura x 2 yrs. Dear Darin Irvin, *     Thank you for your referral of Kapil Lemon to the Neurology clinic, an alert, cooperative 51-year-old morbidly obese woman with history of chronic episodic migraine headaches occurring 4-5 times monthly for the past 2 years and chronic daily headache pattern consisting of tension type or pressure like headaches, without hemicranial preference, localized over both sides of her head and temples, history of TMJ syndrome with postauricular pain described, obstructive sleep apnea, PCOS, vitamin D deficiency, anxiety/depression, and history of vertical sleeve gastrectomy in 2017 at [facility]. He is planning to undergo a bariatric surgery at [facility]. She is taking low-dose propranolol 10 mg at bedtime and PRN sumatriptan 50 mg for migraine onset but has not made a difference in helping to reduce or manage her headaches. In the past she was treated with Effexor and Wellbutrin for anxiety/depression. She cannot take NSAIDs due to history of bariatric surgery. She was previously referred to Dr. Corby Cardenas, Neurosurgery, for opinion regarding the Chiari I malformation however the chart record indicates this appointment was canceled. Migraines occur 4-5 times monthly, at least 2 times per week and on a daily basis she does have other headaches consisting of chronic intermittent tension type headaches described as an aching or pressure type pain sensation without migraine features, thus a chronic daily headache pattern or mixed headache pain syndrome. There are no specific headache triggers. She describes the migraine headache pain type as throbbing when more severe. There is no hemicranial preference.   There is no visual aura preceding or associated with the migraine. She does experience photophobia and sonophobia, mild nausea but no vomiting. She may experience a tingling sensation affecting her lips and around her eyes associated with a migraine. The headaches last for hours to up to a day, waxing and waning in intensity throughout the day. There is a positive family history of migraines in her mother and sister. She consumes 1 cup of coffee per day. Lab Data: Reviewed from 1/23/2020 and 10/25/2019, abnormal LDL cholesterol; low vitamin D level of 11, normal CBC. Imaging Data: No brain imaging report on file in Epic/media. Addendum: MR brain scan report received from [facility], 11/24/2017, unremarkable brain parenchyma, Chiari malformation with inferior position of cerebellar tonsils extending 8.5 mm below the foramen magnum. Neurology consult report reviewed from JAMIE Thompson CNP; 1/17/2018. Current Outpatient Medications   Medication Sig Dispense Refill    propranolol (INDERAL) 10 MG tablet Take one 3 times daily, may increase to 2 twice daily in 2 wks. , migraine 186 tablet 5    Cholecalciferol (VITAMIN D) 50 MCG (2000 UT) CAPS capsule Take 1 capsule by mouth daily      Multiple Vitamins-Minerals (MULTIVITAMIN ADULT PO) Take 1 tablet by mouth daily      propranolol (INDERAL) 10 MG tablet Take 1 tablet by mouth nightly 30 tablet 1    SUMAtriptan (IMITREX) 50 MG tablet Take 0.5 tablets by mouth once as needed for Migraine 9 tablet 1    omeprazole (PRILOSEC) 20 MG delayed release capsule Take 20 mg by mouth 2 times daily        No current facility-administered medications for this visit.         Allergies   Allergen Reactions    Ciprofloxacin Anaphylaxis       Patient Active Problem List   Diagnosis    Recurrent acute suppurative otitis media of right ear without spontaneous rupture of tympanic membrane    Acute otitis externa of right ear    Migraine headache    PCOS (polycystic ovarian syndrome)    Vitamin D deficiency    Folic acid deficiency    Chronic tension-type headache, intractable       Past Medical History:   Diagnosis Date    Allergic rhinitis     Anesthesia complication     pulse ox dropped    Arthritis     Chronic sinusitis     Chronic tension-type headache, intractable 2020    Constipation     Depression with anxiety     Earache     Folic acid deficiency     Migraine headache 2020    Migraines     Morbidly obese (HCC)     PCOS (polycystic ovarian syndrome) 2020    Polycystic ovarian syndrome     RLS (restless legs syndrome)     Swelling of both lower extremities     Vitamin D deficiency 2020       Past Surgical History:   Procedure Laterality Date    APPENDECTOMY  2003     SECTION  2006    CHOLECYSTECTOMY  2009    DILATION AND CURETTAGE OF UTERUS  2013    [facility]STEROSCOPY  [facility]   [facility]APAROSCOPY    [facility]    TUBAL LIGATION  2014    Abalation    UPPER GASTROINTESTINAL ENDOSCOPY N/A 2020    EGD BIOPSY performed by Jose A Carbajal MD at [facility] ENDOSCOPY       Family History   Problem Relation Age of Onset    High Blood Pressure Mother     High Cholesterol Mother     Cancer Maternal Grandmother     COPD Maternal Grandmother     Diabetes Maternal Grandmother     Elevated Lipids Maternal Grandmother     Hypertension Maternal Grandmother     Heart Disease Maternal Grandfather     Cancer Maternal Grandfather     Diabetes Maternal Grandfather        Social History     Socioeconomic History    Marital status:      Spouse name: Not on file    Number of children: Not on file    Years of education: Not on file    Highest education level: Not on file   Occupational History    Not on file   Social Needs    Financial resource strain: Not on file    Food insecurity:     Worry: Not on file     Inability: Not on file    Transportation needs:     Medical: Not on file     Non-medical: Not on file   Tobacco acute distress  HEENT: Normocephalic/atraumatic. Neck: Supple, no carotid bruits  Cardiac: RRR  Respiratory: grossly clear  Extremities: Morbidly obese, without erythema, cyanosis  Skin: Tattoos, right arm, no rashes or lesions apparent  Musculoskeletal: No fasciculations or tremors, negative bilateral straight leg raising test.  Mental Status: Alert, oriented x3  Speech/Language: Clear, grossly fluent  Attention span/Concentration: Grossly intact  Affect/Mood: Mildly constricted, anxious, mildly dysthymic mood, decreased facial expression  Insight/Judgement: Appears fairly good     Fund of Knowledge/Current events: Grossly intact  CN II-XII:     Pupils: Equal, reactive to light, 1.4 mm     EOM's: Full without nystagmus  Visual Fields: full to confrontation  Fundi: Miosis to light, grossly unremarkable without gross papilledema noted. CN V: normal V1-V3  CN VII: No facial droop, decreased facial expression  CN VIII: Hearing grossly intact  CN IX-XII: No palatal asymmetry, tongue midline  SCM/Trapezii: 5/5 power  Motor: 5/5 power in the upper and lower extremities without tremor or drift and normal motor tone without cogwheeling or spasticity, intact fine motor function of both hands, symmetric. DTR's: 1+ and symmetric in the upper extremities, 1+ patellar, trace ankle jerks, no ankle clonus, plantar responses are flexor. Sensory: Grossly intact subjectively to light touch and sharp stick testing. Coordination/Gait: No gross limb dysmetria, truncal or cerebellar gait ataxia, two-step turns. Assessment/Plan:  1. Chronic variant migraine headache pain syndrome without hemicranial preference and chronic intermittent tension type headaches comprising a chronic daily headache pattern or mixed headache pain syndrome.   2.  Multiple medical/psychological comorbidities including morbid obesity, PCOS, anxiety/depression, obstructive sleep apnea, vitamin D deficiency, Chiari I malformation, TMJ, history of bariatric surgery planning another bariatric surgery revision. 3.  For headache prophylaxis as she is already prescribed propranolol 10 mg at bedtime I have advised her to titrate propranolol to 10 mg 3 times daily and in several weeks may titrate further to 20 mg 3 times daily. She will continue to utilize Imitrex  mg at the onset of a migraine or may start with 50 mg and take an additional 50 mg tablet in 2 to 4 hours for a breakthrough migraine as directed. A magnesium supplement could also be added such as Slow-Mag daily, magnesium oxide 250 mg daily or magnesium chloride 200 mg daily for migraine supplement. 4.  Additional lab tests are ordered as specified below and once resulted she will be informed accordingly. 5.  She was provided patient information from the [facility] [place] website. 6.  Follow-up in the Neurology clinic in 8 weeks otherwise. Sincerely,      Dane Barraza MD    This note was created using speech recognition transcription software. Despite proofreading, there may be several typographical errors present that may affect the meaning of the content. Please call with any questions. Note: More than 50% of this 40-minute face-face visit time included counseling and coordination of care based on clinical impression, review of test results, treatment plan, risk factor reduction and patient and/or family education. \\  Orders Placed This Encounter   Procedures    Magnesium     Standing Status:   Future     Standing Expiration Date:   1/24/2021    C-Reactive Protein     Standing Status:   Future     Standing Expiration Date:   1/24/2021    Sedimentation Rate     Standing Status:   Future     Standing Expiration Date:   1/24/2021    Ammonia     Standing Status:   Future     Standing Expiration Date:   1/24/2021    HEPATITIS C ANTIBODY     Standing Status:   Future     Standing Expiration Date:   1/24/2021

## 2020-01-25 LAB — ZINC: 67.6 UG/DL (ref 60–120)

## 2020-01-26 LAB — VITAMIN B1 WHOLE BLOOD: 117 NMOL/L (ref 70–180)

## 2020-01-27 LAB — HEPATITIS C ANTIBODY INTERPRETATION: NORMAL

## 2020-01-31 ENCOUNTER — INITIAL CONSULT (OUTPATIENT)
Dept: BARIATRICS/WEIGHT MGMT | Age: 35
End: 2020-01-31
Payer: COMMERCIAL

## 2020-01-31 ENCOUNTER — TELEPHONE (OUTPATIENT)
Dept: BARIATRICS/WEIGHT MGMT | Age: 35
End: 2020-01-31

## 2020-01-31 ENCOUNTER — OFFICE VISIT (OUTPATIENT)
Dept: BARIATRICS/WEIGHT MGMT | Age: 35
End: 2020-01-31
Payer: COMMERCIAL

## 2020-01-31 VITALS — HEIGHT: 67 IN | BODY MASS INDEX: 45.36 KG/M2 | WEIGHT: 289 LBS

## 2020-01-31 VITALS
WEIGHT: 289 LBS | BODY MASS INDEX: 45.36 KG/M2 | RESPIRATION RATE: 20 BRPM | HEIGHT: 67 IN | HEART RATE: 75 BPM | SYSTOLIC BLOOD PRESSURE: 127 MMHG | TEMPERATURE: 97.7 F | DIASTOLIC BLOOD PRESSURE: 76 MMHG

## 2020-01-31 PROCEDURE — 99999 PR OFFICE/OUTPT VISIT,PROCEDURE ONLY: CPT | Performed by: DIETITIAN, REGISTERED

## 2020-01-31 PROCEDURE — 99213 OFFICE O/P EST LOW 20 MIN: CPT | Performed by: SURGERY

## 2020-01-31 PROCEDURE — 99212 OFFICE O/P EST SF 10 MIN: CPT

## 2020-01-31 RX ORDER — ERGOCALCIFEROL 1.25 MG/1
50000 CAPSULE ORAL WEEKLY
Qty: 12 CAPSULE | Refills: 1 | Status: SHIPPED
Start: 2020-01-31 | End: 2020-02-21

## 2020-01-31 NOTE — PATIENT INSTRUCTIONS
possible to help you get approved for weight loss surgery, but cannot guarantee an approval.      Please note that you will not be submitted to your insurance company until all   pre-operative testing requirements are met. · Please remember you need to schedule to attend one Support Group meeting held at the [facility] before surgery. This one meeting is mandatory. You can attend as many support group meetings before and after surgery. Support group meetings are held at the [facility] from 6:00 - 8:00pm 1st, and 3rd Tuesday of every month. See dates listed below. · Each individual person has his / her own medical requirements that need fulfilled before being able to schedule bariatric surgery . Please finalize these requirements by contacting our Bariatric Nurse at [phone]. High Vitamin D Foods:  Written By: Sergio Denton RD/MARLENE    What role does Vitamin D play in the body? Vitamin D is known as the sunshine vitamin. Vitamin D is actually a hormone that is produced in our bodies by ultraviolet B rays. These light rays trigger the production of vitamin D by our skin cells. The hormone that is produced is called calcitriol and once it is made it travels to the intestines to help with the absorption of dietary calcium, as well as fluoride. How does Vitamin D work? Vitamin D and Calcium work together to promote the growth of bones and development of healthy teeth. The amount of vitamin D you produce effects the amount of calcium that can be absorbed by the body. When calcium from foods you eat reaches the intestines, it waits for the presence of the vitamin D hormone, calcitriol, to be able to cross the intestinal membranes and to be transported to your bones. Without sufficient vitamin D, calcium levels in the bones and teeth will decrease leading to weak, brittle bones and increasing the possibility of osteoporosis. WHY?   Calcium is primarily

## 2020-01-31 NOTE — TELEPHONE ENCOUNTER
Called pharmacy to adjust [place] [address] 2x wkly ( pr Algorithm of Dr Leyda Ng from 1/31/20). Lab script to be drawn 3/30/20 given to pt by Dr Mere Ortega.

## 2020-01-31 NOTE — PROGRESS NOTES
[facility]  Initial Nutrition Consultation    Today's Date: 1/31/2020  Patients Name:Zehra CLARK Randall-Covert     Height: 5' 7\" (1.702 m)   Weight: 289 lb (131.1 kg)   IBW: 163 lbs  %IBW: 177%  Excess Weight: 126 lbs  BMI: Body mass index is 45.26 kg/m². Subjective Information:   Patient has tried multiple means of weight loss including diet and exercise in the past and has been unable to maintain a healthy weight. Patients overall goal is to be able to lose weight with diet and exercise by changing lifestyle, habits and maintain a healthy weight for a lifetime. Are your currently Diabetic - No  Last Blood Glucose Reading - 1/23/20 - Glucose 97  Last HGBA1C - 1/23/20 - HGBA1C 5.1L    Patient states the following will be the most difficult change after weight loss surgery? Pt states avoiding caffeine after weight loss sx will be difficult along with waiting to eat Red Meat. Most successful diet in the past? None  Length of time patient was on the diet listed above? 6 Month's  Weight lost on the diet listed above? 75 lbs  Patient stated he / she maintained his / her weight for the following time? Couple month's and then the pt started to regain. Scale of 1 (Least Likely) to 10 ( Most Likely  - What is your readiness to change and adhere to your new diet and lifestyle change we reviewed - 10  At a level 10 How do you foresee yourself being successful with the change - Pt states she knows some of what to expect due to having a LSG and is more ready this time than last time. Patient takes the following vitamins, minerals and dietary supplements? Yes - I currently take a Vitamin D3 1,000iu daily, MVI. Rd / Ld recommends the patient continue the following supplements from now until surgery. Patient consumes the following beverage daily?  Water    Pre-Op Labs - 1/23/20 - Glucose 97, HGBA1C 5.1, Vitamin D 11L - Dr. Delonte Fraire is tx pts Vit D lab recheck 3/30/20    Patient states he / she has the following problems:  no - Eating  no - Chewing  no - Swallowing  no - Nausea  no - Vomiting  yes - Diarrhea - D/T IBS  yes - Constipation - D/T IBS  no - Hair Changes  no - Skin Changes  no - Tongue Texture    Food Allergies: no  -   Food Intolerances: no -     Yes - Past medically assisted weight loss history reviewed. Yes - Provided education regarding the basic role of nutrition in junction with Bariatric Surgery. Yes - Provided overview of required dietary and behavioral changes pre and post operatively. Yes - Stated / reinforced that changes in dietary behaviors are critical to successful, long term weight Loss. Patient is aware that in order to proceed with bariatric surgery he / she will need to follow a low-fat diet prior to surgery. Patient is aware that bariatric surgery is a lifetime change that will require the patient to follow a low-fat, low-calorie, low-sugar, portion controlled diet with at least 30 minutes of physical activity daily. Patient is aware that certain foods may not be tolerated after bariatric surgery and certain foods will need avoided all together. Lucretia Oshea / LD feels that this patient knowledge / readiness to make appropriate diet / lifestyle changes assessed to be? - Good    MINDI / LD feels this patients expected adherence for post surgical diet? - Good    Patient was instructed and signed consents on a low-fat diet and required supplementation at initial consult. Comments: Patient is able to verbalize diet concepts for bariatric surgery. Patient is aware diet concepts will be reinforced at 2-hour nutrition education consult. RD / LD will monitor progress.

## 2020-02-05 RX ORDER — SUMATRIPTAN 50 MG/1
TABLET, FILM COATED ORAL
Qty: 9 TABLET | Refills: 0 | Status: SHIPPED
Start: 2020-02-05 | End: 2021-04-23

## 2020-02-05 NOTE — TELEPHONE ENCOUNTER
Last Appointment:  12/13/2019  Future Appointments   Date Time Provider Sarah Rodriguezi   2/7/2020  2:15 PM DO Rolanda Whitaker Card [facility]   2/18/2020  8:30 AM Roxana Esposito MS, RD, LD Surg Weight [facility]   2/21/2020  8:00 [facility]   2/28/2020  2:30 PM LATONIA Amaral Surg Weight [facility]   3/20/2020  1:00 PM Randy Thomas MD [facility]   4/10/2020  8:15 AM Rogerio Avina MD Surg Weight [facility]   5/19/2020  2:00 PM Bebeto Knight [facility] [address]

## 2020-02-14 ENCOUNTER — OFFICE VISIT (OUTPATIENT)
Dept: PODIATRY | Age: 35
End: 2020-02-14
Payer: COMMERCIAL

## 2020-02-14 PROCEDURE — 20605 DRAIN/INJ JOINT/BURSA W/O US: CPT | Performed by: PODIATRIST

## 2020-02-14 PROCEDURE — 99999 PR OFFICE/OUTPT VISIT,PROCEDURE ONLY: CPT | Performed by: PODIATRIST

## 2020-02-14 RX ORDER — METHYLPREDNISOLONE ACETATE 40 MG/ML
40 INJECTION, SUSPENSION INTRA-ARTICULAR; INTRALESIONAL; INTRAMUSCULAR; SOFT TISSUE ONCE
Status: COMPLETED | OUTPATIENT
Start: 2020-02-14 | End: 2020-02-14

## 2020-02-14 RX ORDER — DEXAMETHASONE SODIUM PHOSPHATE 4 MG/ML
4 INJECTION, SOLUTION INTRA-ARTICULAR; INTRALESIONAL; INTRAMUSCULAR; INTRAVENOUS; SOFT TISSUE ONCE
Status: COMPLETED | OUTPATIENT
Start: 2020-02-14 | End: 2020-02-14

## 2020-02-14 RX ORDER — LIDOCAINE HYDROCHLORIDE 10 MG/ML
1 INJECTION, SOLUTION INFILTRATION; PERINEURAL ONCE
Status: COMPLETED | OUTPATIENT
Start: 2020-02-14 | End: 2020-02-14

## 2020-02-14 RX ADMIN — METHYLPREDNISOLONE ACETATE 40 MG: 40 INJECTION, SUSPENSION INTRA-ARTICULAR; INTRALESIONAL; INTRAMUSCULAR; SOFT TISSUE at 08:24

## 2020-02-14 RX ADMIN — DEXAMETHASONE SODIUM PHOSPHATE 4 MG: 4 INJECTION, SOLUTION INTRA-ARTICULAR; INTRALESIONAL; INTRAMUSCULAR; INTRAVENOUS; SOFT TISSUE at 08:23

## 2020-02-14 RX ADMIN — LIDOCAINE HYDROCHLORIDE 1 ML: 10 INJECTION, SOLUTION INFILTRATION; PERINEURAL at 08:23

## 2020-02-14 NOTE — PROGRESS NOTES
ankle  -     dexamethasone (DECADRON) injection 4 mg  -     lidocaine 1 % injection 1 mL  -     methylPREDNISolone acetate (DEPO-MEDROL) injection 40 mg      Follow-up right ankle pain    Potential risks, complications, alternative treatments, and procedure prognosis were explained to the patient. Verbal informed consent was obtained from the patient. Betadine and alcohol preparation was performed   Ethyl chloride used over injection site right ankle  I next used a sterile 3 mL syringe with 25-gauge needle with 1 mL 1% lidocaine plain, 1 mL 1% dexamethasone and 1 mL methylprednisone injected right ankle anterior standard approach medial to tendon and neurovascular structures. Patient tolerated corticosteroid injection well. Band-Aid applied. The patient was educated on a possible steroid flare and the use of ice with frozen water bottle 10 minutes tonight. Patient does have a PT TD brace on the right. I did discuss custom Arthur brace to be done at [facility]. We will discuss this at follow-up as well. I will follow-up in 6 months to monitor overall progression. Due to patient's young age I would likely obtain MRI before repeat corticosteroid injection right ankle. I will follow-up in 6 months. Return in about 6 months (around 8/14/2020). Seen By:  Harvinder Miranda DPM      Document was created using voice recognition software. Note was reviewed, however may contain grammatical errors.

## 2020-02-18 ENCOUNTER — INITIAL CONSULT (OUTPATIENT)
Dept: BARIATRICS/WEIGHT MGMT | Age: 35
End: 2020-02-18
Payer: COMMERCIAL

## 2020-02-18 VITALS — WEIGHT: 284 LBS | BODY MASS INDEX: 44.48 KG/M2

## 2020-02-18 PROBLEM — Z00.8 NUTRITIONAL ASSESSMENT: Status: ACTIVE | Noted: 2020-02-18

## 2020-02-18 PROCEDURE — 99999 PR OFFICE/OUTPT VISIT,PROCEDURE ONLY: CPT | Performed by: DIETITIAN, REGISTERED

## 2020-02-18 NOTE — PROGRESS NOTES
fullness  - Rd / Ld enc meal planning  - Rd / Ld  Enc pt to chose nutrient dense whole foods instead of soft, high calorie foods  - Rd / Ld enc not dr Cruz Mimes large amounts of fluids with or immediately after meals    Portion control ,meal planning and avoiding empty calorie consumption. Rd / Ld reviewed insurance company weight loss requirement on 2/18/20. Pt verbalized understanding. Please be aware at each visit you have been instructed that in order for your insurance company to approve your surgery you must show a consistent weight loss of 2 lbs or greater at each visit. We can not guarantee an approval by your insurance company we can only provide the information given to us it is up to you the patient to show compliancy to your insurance company.   If you do gain weight during your supervised weight loss counseling sessions insurance companies starting in 2018 are denying patients for not showing consistent weight loss results when part of a supervised weight loss counseling program.

## 2020-02-21 ENCOUNTER — OFFICE VISIT (OUTPATIENT)
Dept: CARDIOLOGY CLINIC | Age: 35
End: 2020-02-21
Payer: COMMERCIAL

## 2020-02-21 VITALS
WEIGHT: 287.3 LBS | HEIGHT: 67 IN | BODY MASS INDEX: 45.09 KG/M2 | RESPIRATION RATE: 18 BRPM | SYSTOLIC BLOOD PRESSURE: 124 MMHG | HEART RATE: 68 BPM | DIASTOLIC BLOOD PRESSURE: 72 MMHG

## 2020-02-21 PROCEDURE — 99204 OFFICE O/P NEW MOD 45 MIN: CPT | Performed by: INTERNAL MEDICINE

## 2020-02-21 PROCEDURE — 93000 ELECTROCARDIOGRAM COMPLETE: CPT | Performed by: INTERNAL MEDICINE

## 2020-02-21 NOTE — PROGRESS NOTES
CHIEF COMPLAINT: Pre-op    HISTORY OF PRESENT ILLNESS: Patient is a 29 y.o. female seen at the request of Shelia Lind DO. Patient seen for pre-op clearance. Some HOLDEN. No CP. Denies prior cardiac history. Past Medical History:   Diagnosis Date    Allergic rhinitis     Anesthesia complication     pulse ox dropped    Arthritis     Chronic sinusitis     Chronic tension-type headache, intractable 1/24/2020    Constipation     Depression with anxiety     Earache     Folic acid deficiency 5/13/0015    Migraine headache 1/24/2020    Migraines     Morbidly obese (HCC)     PCOS (polycystic ovarian syndrome) 1/24/2020    Polycystic ovarian syndrome     RLS (restless legs syndrome)     Swelling of both lower extremities     Vitamin D deficiency 1/24/2020       Patient Active Problem List   Diagnosis    Recurrent acute suppurative otitis media of right ear without spontaneous rupture of tympanic membrane    Acute otitis externa of right ear    Migraine headache    PCOS (polycystic ovarian syndrome)    Vitamin D deficiency    Folic acid deficiency    Chronic tension-type headache, intractable    Nutritional assessment       Allergies   Allergen Reactions    Ciprofloxacin Anaphylaxis       Current Outpatient Medications   Medication Sig Dispense Refill    SUMAtriptan (IMITREX) 50 MG tablet TAKE 1/2 TABLET BY MOUTH ONCE FOR ONE DOSE AS NEEDED FOR MIGRAINE 9 tablet 0    propranolol (INDERAL) 10 MG tablet Take one 3 times daily, may increase to 2 twice daily in 2 wks. , migraine 186 tablet 5    Cholecalciferol (VITAMIN D) 50 MCG (2000 UT) CAPS capsule Take 1 capsule by mouth daily      Multiple Vitamins-Minerals (MULTIVITAMIN ADULT PO) Take 1 tablet by mouth daily      omeprazole (PRILOSEC) 20 MG delayed release capsule Take 20 mg by mouth 2 times daily        No current facility-administered medications for this visit.         Social History     Socioeconomic History    Marital status:      Spouse name: Not on file    Number of children: Not on file    Years of education: Not on file    Highest education level: Not on file   Occupational History    Not on file   Social Needs    Financial resource strain: Not on file    Food insecurity:     Worry: Not on file     Inability: Not on file    Transportation needs:     Medical: Not on file     Non-medical: Not on file   Tobacco Use    Smoking status: Former Smoker     Packs/day: 0.50     Years: 22.00     Pack years: 11.00     Start date:      Last attempt to quit: 2015     Years since quittin.1    Smokeless tobacco: Never Used    Tobacco comment: quit smoking 2015   Substance and Sexual Activity    Alcohol use:  Yes     Alcohol/week: 1.0 standard drinks     Types: 1 Glasses of wine per week     Comment: monthly    Drug use: No    Sexual activity: Yes   Lifestyle    Physical activity:     Days per week: Not on file     Minutes per session: Not on file    Stress: Not on file   Relationships    Social connections:     Talks on phone: Not on file     Gets together: Not on file     Attends [employer] service: Not on file     Active member of club or organization: Not on file     Attends meetings of clubs or organizations: Not on file     Relationship status: Not on file    Intimate partner violence:     Fear of current or ex partner: Not on file     Emotionally abused: Not on file     Physically abused: Not on file     Forced sexual activity: Not on file   Other Topics Concern    Not on file   Social History Narrative    Not on file       Family History   Problem Relation Age of Onset    High Blood Pressure Mother     High Cholesterol Mother     Cancer Maternal Grandmother     COPD Maternal Grandmother     Diabetes Maternal Grandmother     Elevated Lipids Maternal Grandmother     Hypertension Maternal Grandmother     Heart Disease Maternal Grandfather     Cancer Maternal Grandfather     Diabetes Maternal Grandfather        Review of Systems:  Heart: as above   Lungs: as above   Eyes: denies changes in vision or discharge. Ears: denies changes in hearing or pain. Nose: denies epistaxis or masses   Throat: denies sore throat or trouble swallowing. Neuro: denies numbness, tingling, tremors. Skin: denies rashes or itching. : denies hematuria, dysuria   GI: denies vomiting, diarrhea   Psych: denies mood changed, anxiety, depression. All other systems negative. Physical Exam   /72   Pulse 68   Resp 18   Ht 5' 7\" (1.702 m)   Wt 287 lb 4.8 oz (130.3 kg)   BMI 45.00 kg/m²   Constitutional: Oriented to person, place, and time. Well-developed and well-nourished. No distress. Head: Normocephalic and atraumatic. Eyes: EOM are normal. Pupils are equal, round, and reactive to light. Neck: Normal range of motion. Neck supple. No hepatojugular reflux and no JVD present. Carotid bruit is not present. No tracheal deviation present. No thyromegaly present. Cardiovascular: Normal rate, regular rhythm, normal heart sounds and intact distal pulses. Exam reveals no gallop and no friction rub. No murmur heard. Pulmonary/Chest: Effort normal and breath sounds normal. No respiratory distress. No wheezes. No rales. No tenderness. Abdominal: Soft. Bowel sounds are normal. No distension and no mass. No tenderness. No rebound and no guarding. Musculoskeletal: Normal range of motion. No edema and no tenderness. Lymphadenopathy:   No cervical adenopathy. No groin adenopathy. Neurological: Alert and oriented to person, place, and time. Skin: Skin is warm and dry. No rash noted. Not diaphoretic. No erythema. Psychiatric: Normal mood and affect. Behavior is normal.     EKG:  normal sinus rhythm, nonspecific ST and T waves changes, iRBBB.     ASSESSMENT AND PLAN:  Patient Active Problem List   Diagnosis    Recurrent acute suppurative otitis media of right ear without spontaneous rupture of tympanic membrane    Acute otitis externa of right ear    Migraine headache    PCOS (polycystic ovarian syndrome)    Vitamin D deficiency    Folic acid deficiency    Chronic tension-type headache, intractable    Nutritional assessment     1. Pre-op: For gastric bypass surgery. ETT to risk stratify. Vern Beck D.O.   Cardiologist  Cardiology, [facility]

## 2020-02-24 ENCOUNTER — TELEPHONE (OUTPATIENT)
Dept: CARDIOLOGY CLINIC | Age: 35
End: 2020-02-24

## 2020-02-24 NOTE — TELEPHONE ENCOUNTER
Pt. Says she has ankle problems and pain while walking and doesn't think she can go ahead with stress test, please advise

## 2020-02-28 ENCOUNTER — INITIAL CONSULT (OUTPATIENT)
Dept: BARIATRICS/WEIGHT MGMT | Age: 35
End: 2020-02-28
Payer: COMMERCIAL

## 2020-02-28 ENCOUNTER — HOSPITAL ENCOUNTER (OUTPATIENT)
Dept: GENERAL RADIOLOGY | Age: 35
Discharge: HOME | End: 2020-03-01
Payer: COMMERCIAL

## 2020-02-28 ENCOUNTER — HOSPITAL ENCOUNTER (OUTPATIENT)
Dept: GENERAL RADIOLOGY | Age: 35
End: 2020-02-28
Payer: COMMERCIAL

## 2020-02-28 PROCEDURE — 90791 PSYCH DIAGNOSTIC EVALUATION: CPT | Performed by: SOCIAL WORKER

## 2020-02-28 PROCEDURE — 76642 ULTRASOUND BREAST LIMITED: CPT

## 2020-02-28 PROCEDURE — G0279 TOMOSYNTHESIS, MAMMO: HCPCS

## 2020-02-28 ASSESSMENT — PATIENT HEALTH QUESTIONNAIRE - PHQ9
SUM OF ALL RESPONSES TO PHQ QUESTIONS 1-9: 0
SUM OF ALL RESPONSES TO PHQ9 QUESTIONS 1 & 2: 0
2. FEELING DOWN, DEPRESSED OR HOPELESS: 0
SUM OF ALL RESPONSES TO PHQ QUESTIONS 1-9: 0
1. LITTLE INTEREST OR PLEASURE IN DOING THINGS: 0

## 2020-02-28 NOTE — PATIENT INSTRUCTIONS
for wanting to change, think about the progress you've made, and give yourself a pep talk and a pat on the back. · Get professional help. A dietitian can help you make your diet healthier while still allowing you to eat foods that you enjoy. A  or physical therapist can help design an exercise program that is fun and easy to stay on. A counselor, a , or your doctor can help you overcome hurdles, reduce stress, or quit smoking. Where can you learn more? Go to [email or web address] org and sign in to your [employer] account. Enter R173 in the CIHI box to learn more about \"Learning About Changing a Habit by Setting Goals. \"     If you do not have an account, please click on the \"Sign Up Now\" link. Current as of: May 28, 2019  Content Version: 12.3  © 2312-0004 Healthwise, [employer]. Care instructions adapted under license by [facility] ([facility]). If you have questions about a medical condition or this instruction, always ask your healthcare professional. [address] any warranty or liability for your use of this information. Assignments/Recommendations: 3-4 follow-up sessions with SW for further education/evaluation. Complete entries in \"Emotional Eating\" and \"Reality Journal\"  to discuss next session. Attend [facility] support group. Follow-up with: present providers/all scheduled appointments at [facility].

## 2020-02-28 NOTE — PROGRESS NOTES
Diagnostic Evaluation without Medical Services. Sujey Dean is a 29 y.o. ,   female, referred by co-workers  for evaluation and treatment. Patient identify verified by Name and . Those attending session : patient      Chief Complaint   Patient presents with    Consultation     Evaluation for bariatric surgery           Motivation for surgery: Other: PT previously has gastric sleeve procedure 2017 in [address] [place] [address]. PT continues to have problem with GERD and has ained some weight back and is seeking a revision to RnY. Understanding of procedure: The patient has researched the procedure and understands the possibility of potential weight gain. , The patient  has talked with other people who have undergone the procedure. and The patient  has attended a gastric bypas surgery group. Reported Current weight 287. Wt Readings from Last 3 Encounters:   20 288 lb (130.6 kg)   20 287 lb 4.8 oz (130.3 kg)   20 289 lb (131.1 kg)       Expectations: The patient expects to loose 80  lbs following surgery over 12 months. Goal weight post surgery: 180 lbs. Other expectations: Other: more energy    EAT/WEIGHT HISTORY    How old were you when you first became concerned with your weight? 13  Most successful diet in the past? sleeve  Weight lost on the diet listed above? 70 pounds  Patient stated he / she maintained his / her weight for the following time? regained all of her weight and more, after about 4 to 6 months. How much control over your eating do you feel you Have? Somewhat control       Cravings: For what types of food: bread, fast food, cheese burger                   Strategies used to deal with cravings: packing lunch       Eating habits: number of meals/day: 1-2 , Breakfast:  no, Morning snack:  yes, Lunch:  yes, Afternoon snack:  yes, Dinner:  yes and Evening snack:  yes        Emotional eating: Boredom  .       BINGE EATING    Recurrent episodes of binge eating: An episode is characterized by:  1. Eating a larger amount of food than normal during a short period of time (within any two hour period): Yes  2. Lack of control over eating during the binge episode (i.e. The feeling that one cannot stop eating): Yes  Both Symptoms Met: Yes    Binge eating episodes are associated with three or more of the followin. Eating until feeling uncomfortably full: Yes  2. Eating large amounts of food when not physically hungary: Yes  3. Eating much more rapidly than normal: Yes  4. Eating alone because you are embarrassed by how much you are eating; Yes  5. Feeling disgusted, depressed, or guilty after eating: Yes  THREE ASSOCIATED SYMPTOMS MET:Yes    Marked distress regarding binge eating is present: Yes    Binge eating occurs at least once a week for 3 months: Yes  The patient reports at least NA binge episodes per week for the past NA months. COMPULSIVE EATING PATTERN    1. Compulsive overeating without thoughts to harmful consequences (weight gain, diabetes, chronic pain, low self esteem); No  2. Inability to reduce or change continuous patterns of food intake (snacking/grazing, overeating foods that are high in simple carbs and/or fats); No  3. Continued compulsive eating in spite of negative consequences. (Obesity, diabetes complications, others); No    The binge eating is not associated with the regular use of inappropriate compensatory behavior (i.e. Purging, excessive exercise etc) and does not occur exclusively during the course of bulimia nervosa or anorexia nervosa: No    PATIENT MEETS ABOVE CRITERIA FOR  EATING DISORDER: Yes    What lifestyle changes started: If we order out, I am getting cauliflower pizza, packing my lunch, food prepping, limiting snack foods in the house, limiting the amounts of sweets available. Drinking more water, limiting sugar, more physical activities.         [address] [facility] Arrangements: Lives in her own home with her  and son  Children: Richardson Cooks, 15 years old, and 5 step children Pily, Yulia Campos, Penelope, Pato, one grand child Flakito. Employment: Employed full time  LPN at [employer]  Legal none  Domestic Assessment   none reported and home is safe and nurtuing  The patient reports the following stressors: \"I try to do too many things at [employer]"     PSYCHOSOCIAL HISTORY    The patient was born and raised in [address] [facility]. The patient raised in an Intact family, mom stayed home and dad worked. Describes childhood as: \"I had a really good childhood, we were very close and we were always together\"  Siblings: 2 younger sister  Shelley Jordan, 28 and Fabiana, 34  Family relationships: The family is very close still, both nuclear and extended family. Sexual orientation/gender identification: Heterosexual   history:none  Spiritual/ Pentecostalism orientation: none  Cultural beliefs: none  Educational history: Graduated HS and attend Nursing school [employer]  Abuse History: no  Trauma: yes, bullied in middle and High school      The patient reports the following strengths: inelegant, stubborn, caring, hard worker, responsible, good mom.      Mental Status Exam: appearance:  appropriately dressed, appropriately groomed and good hygiene, behavior:  normal, attitude:  cooperative, speech:  appropriate, mood:  euthymic, affect:  congruent with mood, thought content:  no evidence of psychosis, thought process:  logical and coherent, orientation:  oriented in all spheres, memory:  recent:  good and remote:  good, insight:  fair , judgment:  good  and cognitive:  intact and intelligent    RISK ASSESSMENT    Suicide screen: denies current suicidal ideation, plan and intent    Self Injurious Behavior: denies    Homicide screen: denies current homicidal ideation, plan and intent    History of Violence: denies    Access to Guns/Weapons: yes    CLINICAL ASSESSMENT    Major Psychiatric Contraindications for surgery: The patient acknowledged a history of mental health issues:  During the break up with her partner, son's father was very tough, during that time she saw a therapist and prescribed anti deprssants. Discontinue medication in 2012 and has not had any problems with deprssive symtoms since. The patient appeared to have reasonable expectations regarding surgery. Patient was well informed about the surgery and changes needed post surgery. The patient appears to be motivated to make lifestyle changes as evidenced by  meal plan changes, weight loss and other positive changes. .    The patient appears to have good social support as evidenced by family and friends supporting    Spouse's evidence of level of support for surgery: Attended support meeting  Changed own dietary habilts  agree with decision for surgery     Other social supports: Close friends    DIAGNOSIS:   Encounter Diagnosis   Name Primary?  Compulsive eating patterns Yes        TREATMENT PLAN    Patient Goals: Increased understanding of role of emotional factors contributing to issues with food and obesity and strategies to cope with these. Interventions in session: Explored emotional, behavioral, cognitive and environmental factors contributing to issues with food and obesity, with goal of promoting optimal post bariatric surgery outcome. Discussed the importance of attending support group and reinforced the benefits of attending. Provided pt. with hand out \"Emotional Eating\". Safety Plan: not applicable     Assignments/Recommendations: 3-4 follow-up sessions with SW for further education/evaluation. Complete entries in \"Emotional Eating\" and \"Reality Journal\"  to discuss next session. Attend [facility] support group. Follow-up with: present providers/all scheduled appointments at [facility].       Next steps: Schedule follow up with me for  60MIN in 5 weeks    Bariatric Surgery: Based on the information gathered through the interview process - there is no current evidence of mental health or substance abuse issues that would impact on the patient receiving bariatric surgery.     Patient and/or family/guardian verbalizes understanding of and agreement with treatment recommendations and plan: yes    Start time: 2:45 PM          End time: 3:50 PM    Visit Time: LATONIA Hall

## 2020-03-06 ENCOUNTER — TELEPHONE (OUTPATIENT)
Dept: CARDIOLOGY CLINIC | Age: 35
End: 2020-03-06

## 2020-03-06 ENCOUNTER — HOSPITAL ENCOUNTER (OUTPATIENT)
Dept: CARDIOLOGY | Age: 35
Discharge: HOME | End: 2020-03-06
Payer: COMMERCIAL

## 2020-03-06 VITALS
SYSTOLIC BLOOD PRESSURE: 104 MMHG | HEIGHT: 67 IN | BODY MASS INDEX: 45.2 KG/M2 | WEIGHT: 288 LBS | HEART RATE: 85 BPM | DIASTOLIC BLOOD PRESSURE: 64 MMHG

## 2020-03-06 PROCEDURE — 2580000003 HC RX 258: Performed by: INTERNAL MEDICINE

## 2020-03-06 PROCEDURE — A9500 TC99M SESTAMIBI: HCPCS | Performed by: INTERNAL MEDICINE

## 2020-03-06 PROCEDURE — 78452 HT MUSCLE IMAGE SPECT MULT: CPT

## 2020-03-06 PROCEDURE — 3430000000 HC RX DIAGNOSTIC RADIOPHARMACEUTICAL: Performed by: INTERNAL MEDICINE

## 2020-03-06 PROCEDURE — 6360000002 HC RX W HCPCS: Performed by: INTERNAL MEDICINE

## 2020-03-06 PROCEDURE — 93017 CV STRESS TEST TRACING ONLY: CPT

## 2020-03-06 RX ORDER — SODIUM CHLORIDE 0.9 % (FLUSH) 0.9 %
10 SYRINGE (ML) INJECTION PRN
Status: DISCONTINUED | OUTPATIENT
Start: 2020-03-06 | End: 2020-03-07 | Stop reason: HOSPADM

## 2020-03-06 RX ORDER — ERGOCALCIFEROL 1.25 MG/1
CAPSULE ORAL
COMMUNITY
Start: 2020-03-02 | End: 2020-06-19

## 2020-03-06 RX ADMIN — SODIUM CHLORIDE, PRESERVATIVE FREE 10 ML: 5 INJECTION INTRAVENOUS at 09:31

## 2020-03-06 RX ADMIN — SODIUM CHLORIDE, PRESERVATIVE FREE 10 ML: 5 INJECTION INTRAVENOUS at 07:47

## 2020-03-06 RX ADMIN — Medication 11 MILLICURIE: at 07:47

## 2020-03-06 RX ADMIN — Medication 31.4 MILLICURIE: at 09:31

## 2020-03-06 RX ADMIN — SODIUM CHLORIDE, PRESERVATIVE FREE 10 ML: 5 INJECTION INTRAVENOUS at 09:32

## 2020-03-06 RX ADMIN — REGADENOSON 0.4 MG: 0.08 INJECTION, SOLUTION INTRAVENOUS at 09:31

## 2020-03-06 NOTE — PROCEDURES
normal.    Rotational analog analysis demonstrated soft tissue breast attenuation. The gated SPECT stress imaging in the short, vertical long, and horizontal long axis demonstrated normal     A moderate defect was present in the basal anterior and mid anterior wall(s) that was  moderate sized by quantification. The resting images show no change. Gated SPECT left ventricular ejection fraction was calculated to be 68%, with normal myocardial thickening and wall motion. Impression:    1. ECG during the infusion did not change. 2. The myocardial perfusion imaging was normal with attenuation artifact. 3. Overall left ventricular systolic function was normal without regional wall motion abnormalities. 4. Low risk general pharmacologic stress test.    Thank you for sending your patient to this [employer].      Electronically signed by Sonya Gaston DO on 3/6/20 at 12:57 PM

## 2020-03-13 ENCOUNTER — TELEPHONE (OUTPATIENT)
Dept: BARIATRICS/WEIGHT MGMT | Age: 35
End: 2020-03-13

## 2020-03-13 ENCOUNTER — INITIAL CONSULT (OUTPATIENT)
Dept: BARIATRICS/WEIGHT MGMT | Age: 35
End: 2020-03-13
Payer: COMMERCIAL

## 2020-03-13 VITALS — HEIGHT: 67 IN | BODY MASS INDEX: 44.57 KG/M2 | WEIGHT: 284 LBS

## 2020-03-13 PROCEDURE — 99999 PR OFFICE/OUTPT VISIT,PROCEDURE ONLY: CPT | Performed by: DIETITIAN, REGISTERED

## 2020-03-13 NOTE — TELEPHONE ENCOUNTER
her surgery being cancelled. Patient received a copy of this at the time of his / her final dietary consult.

## 2020-03-19 PROBLEM — Z00.8 NUTRITIONAL ASSESSMENT: Status: RESOLVED | Noted: 2020-02-18 | Resolved: 2020-03-19

## 2020-03-26 ENCOUNTER — OFFICE VISIT (OUTPATIENT)
Dept: PRIMARY CARE CLINIC | Age: 35
End: 2020-03-26
Payer: COMMERCIAL

## 2020-03-26 ENCOUNTER — TELEPHONE (OUTPATIENT)
Dept: FAMILY MEDICINE CLINIC | Age: 35
End: 2020-03-26

## 2020-03-26 ENCOUNTER — HOSPITAL ENCOUNTER (OUTPATIENT)
Age: 35
Discharge: HOME | End: 2020-03-28
Payer: COMMERCIAL

## 2020-03-26 VITALS
TEMPERATURE: 99.4 F | DIASTOLIC BLOOD PRESSURE: 68 MMHG | HEART RATE: 74 BPM | BODY MASS INDEX: 44.26 KG/M2 | SYSTOLIC BLOOD PRESSURE: 100 MMHG | WEIGHT: 282 LBS | OXYGEN SATURATION: 97 % | HEIGHT: 67 IN

## 2020-03-26 LAB
INFLUENZA A ANTIGEN, POC: NORMAL
INFLUENZA B ANTIGEN, POC: NORMAL

## 2020-03-26 PROCEDURE — U0002 COVID-19 LAB TEST NON-CDC: HCPCS

## 2020-03-26 PROCEDURE — 99213 OFFICE O/P EST LOW 20 MIN: CPT | Performed by: FAMILY MEDICINE

## 2020-03-26 PROCEDURE — 87804 INFLUENZA ASSAY W/OPTIC: CPT | Performed by: FAMILY MEDICINE

## 2020-03-26 RX ORDER — BENZONATATE 100 MG/1
100 CAPSULE ORAL 3 TIMES DAILY PRN
Qty: 21 CAPSULE | Refills: 0 | Status: SHIPPED | OUTPATIENT
Start: 2020-03-26 | End: 2020-04-02

## 2020-03-26 RX ORDER — ALBUTEROL SULFATE 90 UG/1
2 AEROSOL, METERED RESPIRATORY (INHALATION) 4 TIMES DAILY PRN
Qty: 1 INHALER | Refills: 0 | Status: SHIPPED
Start: 2020-03-26 | End: 2020-10-26

## 2020-03-26 NOTE — PATIENT INSTRUCTIONS
Preventing the Spread of Coronavirus Disease 2019 in Homes and Residential Communities   For the most recent information go to [email or web address]    Prevention steps for People with confirmed or suspected COVID-19 (including persons under investigation) who do not need to be hospitalized  and   People with confirmed COVID-19 who were hospitalized and determined to be medically stable to go home    Your healthcare provider and public health staff will evaluate whether you can be cared for at home. If it is determined that you do not need to be hospitalized and can be isolated at home, you will be monitored by staff from your local or state health department. You should follow the prevention steps below until a healthcare provider or local or state health department says you can return to your normal activities. Stay home except to get medical care  People who are mildly ill with COVID-19 are able to isolate at home during their illness. You should restrict activities outside your home, except for getting medical care. Do not go to work, school, or public areas. Avoid using public transportation, ride-sharing, or taxis. Separate yourself from other people and animals in your home  People: As much as possible, you should stay in a specific room and away from other people in your home. Also, you should use a separate bathroom, if available. Animals: You should restrict contact with pets and other animals while you are sick with COVID-19, just like you would around other people. Although there have not been reports of pets or other animals becoming sick with COVID-19, it is still recommended that people sick with COVID-19 limit contact with animals until more information is known about the virus. When possible, have another member of your household care for your animals while you are sick.  If you are sick with COVID-19, avoid contact with your pet, including be washed thoroughly with soap and water. Clean all high-touch surfaces everyday  High touch surfaces include counters, tabletops, doorknobs, bathroom fixtures, toilets, phones, keyboards, tablets, and bedside tables. Also, clean any surfaces that may have blood, stool, or body fluids on them. Use a household cleaning spray or wipe, according to the label instructions. Labels contain instructions for safe and effective use of the cleaning product including precautions you should take when applying the product, such as wearing gloves and making sure you have good ventilation during use of the product. Monitor your symptoms  Seek prompt medical attention if your illness is worsening (e.g., difficulty breathing). Before seeking care, call your healthcare provider and tell them that you have, or are being evaluated for, COVID-19. Put on a facemask before you enter the facility. These steps will help the healthcare providers office to keep other people in the office or waiting room from getting infected or exposed. Ask your healthcare provider to call the local or [facility] health department. Persons who are placed under active monitoring or facilitated self-monitoring should follow instructions provided by their local health department or occupational health professionals, as appropriate. When working with your local health department check their available hours. If you have a medical emergency and need to call 911, notify the dispatch personnel that you have, or are being evaluated for COVID-19. If possible, put on a facemask before emergency medical services arrive. Discontinuing home isolation  Patients with confirmed COVID-19 should remain under home isolation precautions until the risk of secondary transmission to others is thought to be low.  The decision to discontinue home isolation precautions should be made on a case-by-case basis, in consultation with healthcare providers and state and [facility] health departments.

## 2020-03-26 NOTE — PROGRESS NOTES
Haig Speaker  1985    Chief Complaint   Patient presents with    Chest Congestion      chest tightness occational SOB hard to take a deep breath.  Cough     X 2 days     Generalized Body Aches       Respiratory Symptoms:  Patient complains of 2 day(s) history of productive cough: Sputum is yellow, body aches, chest tightness. Symptoms have been worsening with time. She denies any other symptoms . She was exposed to [employer] at work. She works at [employer]. She states that they had 2 positive patients and one of their docs is being tested. She states that it started with a tickle in her throat. She feels short of breath, has chest tightness. She has been cough with sputum production. She states that she was sent to be checked. Relevant PMH: No pertinent PMH. Smoking history:  She  reports that she quit smoking about 5 years ago. She started smoking about 27 years ago. She has a 11.00 pack-year smoking history. She has never used smokeless tobacco.     She has had ill contacts with URI symptoms. Treatment to date: none. Travel screen completed:  Yes          Vitals:    03/26/20 1523   BP: 100/68   Pulse: 74   Temp: 99.4 °F (37.4 °C)   TempSrc: Oral   SpO2: 97%   Weight: 282 lb (127.9 kg)   Height: 5' 7\" (1.702 m)      Physical Exam  Vitals signs and nursing note reviewed. Constitutional:       Appearance: She is well-developed. HENT:      Head: Normocephalic and atraumatic. Right Ear: External ear normal.      Left Ear: External ear normal.      Nose: Rhinorrhea present. Mouth/Throat:      Pharynx: Posterior oropharyngeal erythema present. Eyes:      Conjunctiva/sclera: Conjunctivae normal.      Pupils: Pupils are equal, round, and reactive to light. Neck:      Musculoskeletal: Normal range of motion and neck supple. Thyroid: No thyromegaly. Cardiovascular:      Rate and Rhythm: Normal rate and regular rhythm.       Heart sounds: Normal department says you can return to your normal activities. Stay home except to get medical care  People who are mildly ill with COVID-19 are able to isolate at home during their illness. You should restrict activities outside your home, except for getting medical care. Do not go to work, school, or public areas. Avoid using public transportation, ride-sharing, or taxis. Separate yourself from other people and animals in your home  People: As much as possible, you should stay in a specific room and away from other people in your home. Also, you should use a separate bathroom, if available. Animals: You should restrict contact with pets and other animals while you are sick with COVID-19, just like you would around other people. Although there have not been reports of pets or other animals becoming sick with COVID-19, it is still recommended that people sick with COVID-19 limit contact with animals until more information is known about the virus. When possible, have another member of your household care for your animals while you are sick. If you are sick with COVID-19, avoid contact with your pet, including petting, snuggling, being kissed or licked, and sharing food. If you must care for your pet or be around animals while you are sick, wash your hands before and after you interact with pets and wear a facemask. Call ahead before visiting your doctor  If you have a medical appointment, call the healthcare provider and tell them that you have or may have COVID-19. This will help the healthcare providers office take steps to keep other people from getting infected or exposed. Wear a facemask  You should wear a facemask when you are around other people (e.g., sharing a room or vehicle) or pets and before you enter a healthcare providers office.  If you are not able to wear a facemask (for example, because it causes trouble breathing), then people who live with you should not stay in the same room with you, or they evaluated for, COVID-19. Put on a facemask before you enter the facility. These steps will help the healthcare providers office to keep other people in the office or waiting room from getting infected or exposed. Ask your healthcare provider to call the local or state health department. Persons who are placed under active monitoring or facilitated self-monitoring should follow instructions provided by their local health department or occupational health professionals, as appropriate. When working with your local health department check their available hours. If you have a medical emergency and need to call 911, notify the dispatch personnel that you have, or are being evaluated for COVID-19. If possible, put on a facemask before emergency medical services arrive. Discontinuing home isolation  Patients with confirmed COVID-19 should remain under home isolation precautions until the risk of secondary transmission to others is thought to be low. The decision to discontinue home isolation precautions should be made on a case-by-case basis, in consultation with healthcare providers and state and local health departments. Dougie Samuel DO  3/26/20     This visit was provided as a focused evaluation during the COVID -19 pandemic/national emergency. A comprehensive review of all previous patient history and testing was not conducted. Pertinent findings were elicited during the visit.

## 2020-03-28 ENCOUNTER — APPOINTMENT (OUTPATIENT)
Dept: GENERAL RADIOLOGY | Age: 35
End: 2020-03-28
Payer: COMMERCIAL

## 2020-03-28 ENCOUNTER — HOSPITAL ENCOUNTER (EMERGENCY)
Age: 35
Discharge: HOME | End: 2020-03-28
Attending: EMERGENCY MEDICINE
Payer: COMMERCIAL

## 2020-03-28 VITALS
DIASTOLIC BLOOD PRESSURE: 76 MMHG | HEIGHT: 67 IN | HEART RATE: 68 BPM | TEMPERATURE: 98.6 F | RESPIRATION RATE: 16 BRPM | WEIGHT: 282 LBS | OXYGEN SATURATION: 97 % | BODY MASS INDEX: 44.26 KG/M2 | SYSTOLIC BLOOD PRESSURE: 120 MMHG

## 2020-03-28 PROCEDURE — 71045 X-RAY EXAM CHEST 1 VIEW: CPT

## 2020-03-28 PROCEDURE — 99285 EMERGENCY DEPT VISIT HI MDM: CPT

## 2020-03-28 PROCEDURE — 93005 ELECTROCARDIOGRAM TRACING: CPT | Performed by: EMERGENCY MEDICINE

## 2020-03-28 ASSESSMENT — ENCOUNTER SYMPTOMS
SHORTNESS OF BREATH: 0
BACK PAIN: 0
CHEST TIGHTNESS: 1
EYE PAIN: 0
NAUSEA: 0
WHEEZING: 0
SORE THROAT: 0
EYE REDNESS: 0
SINUS PRESSURE: 0
ABDOMINAL DISTENTION: 0
VOMITING: 0
ABDOMINAL PAIN: 0
EYE DISCHARGE: 0
DIARRHEA: 0
COUGH: 0

## 2020-03-28 NOTE — ED PROVIDER NOTES
importance of follow-up. Counseling: The emergency provider has spoken with the patient and discussed todays results, in addition to providing specific details for the plan of care and counseling regarding the diagnosis and prognosis. Questions are answered at this time and they are agreeable with the plan. I discussed at length with them reasons for immediate return here for re evaluation. They will followup with their primary care physician by calling their office tomorrow. --------------------------------- IMPRESSION AND DISPOSITION ---------------------------------    IMPRESSION  1. Viral disease exposure        DISPOSITION  Disposition: Discharge to home  Patient condition is stable    Discharge Medication List as of 3/28/2020 11:29 AM          NOTE: This report was transcribed using voice recognition software.  Every effort was made to ensure accuracy; however, inadvertent computerized transcription errors may be present           Veronica Montoya, DO  Resident  03/28/20 2415

## 2020-03-29 LAB
EKG ATRIAL RATE: 68 BPM
EKG P AXIS: 53 DEGREES
EKG P-R INTERVAL: 176 MS
EKG Q-T INTERVAL: 416 MS
EKG QRS DURATION: 104 MS
EKG QTC CALCULATION (BAZETT): 442 MS
EKG R AXIS: 29 DEGREES
EKG T AXIS: 24 DEGREES
EKG VENTRICULAR RATE: 68 BPM

## 2020-03-29 PROCEDURE — 93010 ELECTROCARDIOGRAM REPORT: CPT | Performed by: INTERNAL MEDICINE

## 2020-03-30 ENCOUNTER — CARE COORDINATION (OUTPATIENT)
Dept: CARE COORDINATION | Age: 35
End: 2020-03-30

## 2020-03-30 LAB
REPORT: NORMAL
SARS-COV-2: NOT DETECTED
THIS TEST SENT TO: NORMAL

## 2020-03-31 ENCOUNTER — OFFICE VISIT (OUTPATIENT)
Dept: PRIMARY CARE CLINIC | Age: 35
End: 2020-03-31
Payer: COMMERCIAL

## 2020-03-31 VITALS
BODY MASS INDEX: 44.17 KG/M2 | WEIGHT: 282 LBS | TEMPERATURE: 98.2 F | DIASTOLIC BLOOD PRESSURE: 80 MMHG | HEART RATE: 70 BPM | OXYGEN SATURATION: 97 % | SYSTOLIC BLOOD PRESSURE: 138 MMHG

## 2020-03-31 PROBLEM — R11.10 NON-INTRACTABLE VOMITING: Status: ACTIVE | Noted: 2020-03-31

## 2020-03-31 PROBLEM — R19.7 DIARRHEA: Status: ACTIVE | Noted: 2020-03-31

## 2020-03-31 PROCEDURE — 99213 OFFICE O/P EST LOW 20 MIN: CPT | Performed by: FAMILY MEDICINE

## 2020-03-31 NOTE — PROGRESS NOTES
through the chest wall that is reproducible to palpation. Denies any exertional pain. Denies exertional shortness of breath. May try Imodium . Encourage fluids rest and follow-up if worsening or persistent symptoms. Assessment/Plan:  Jey Fatima was seen today for emesis, diarrhea and other. Diagnoses and all orders for this visit:    Diarrhea, unspecified type    Non-intractable vomiting with nausea, unspecified vomiting type               Michell Ramirez,   3/31/20     This visit was provided as a focused evaluation during the COVID -19 pandemic/national emergency. A comprehensive review of all previous patient history and testing was not conducted. Pertinent findings were elicited during the visit.

## 2020-04-01 ENCOUNTER — TELEPHONE (OUTPATIENT)
Dept: ONCOLOGY | Age: 35
End: 2020-04-01

## 2020-04-02 ENCOUNTER — TELEPHONE (OUTPATIENT)
Dept: PRIMARY CARE CLINIC | Age: 35
End: 2020-04-02

## 2020-04-02 NOTE — TELEPHONE ENCOUNTER
[facility] Follow Up Call    Left message for the patient to call the office regarding Flu Clinic follow up. Patient was seen at the [facility]) on 3/31/20. Awaiting call back.              [facility]  4/2/20

## 2020-04-03 ENCOUNTER — TELEPHONE (OUTPATIENT)
Dept: BARIATRICS/WEIGHT MGMT | Age: 35
End: 2020-04-03

## 2020-04-03 ENCOUNTER — HOSPITAL ENCOUNTER (OUTPATIENT)
Age: 35
Discharge: HOME | End: 2020-04-05
Payer: COMMERCIAL

## 2020-04-03 LAB — VITAMIN D 25-HYDROXY: 18 NG/ML (ref 30–100)

## 2020-04-03 PROCEDURE — 82306 VITAMIN D 25 HYDROXY: CPT

## 2020-04-03 PROCEDURE — 36415 COLL VENOUS BLD VENIPUNCTURE: CPT

## 2020-04-03 PROCEDURE — G0480 DRUG TEST DEF 1-7 CLASSES: HCPCS

## 2020-04-03 NOTE — TELEPHONE ENCOUNTER
Pt is agreeable to do a mychart vv via [employer]. Pt is aware it is still a billable visit. Pt is also okay that there is no encryption and a breach could occur going over what the pt is discussing during the visit.  Pt is agreeable to do this

## 2020-04-03 NOTE — TELEPHONE ENCOUNTER
[facility] Follow Up Call    Flu Clinic Visit Date:  3/31/2020    Patient: Conner Reaves Randall-Covert Patient : 1985     PCP:  Dani Romero DO    Spoke with: Patient    Interactive Patient Contact:  Was patient able to fill all prescriptions: yes  Is patient taking all medications as directed on the After Visit Summary? yes  Does patient understand their visit instructions: Yes  Does patient have questions or concerns that need addressed?: no  Follow-up testing completed?: N/A    Current patient status:   significantly improved    (Suspected COVID only) CDC self-care instructions reviewed:  N/A    Additional patient instructions:      Patient received neg Covid-19 result and has returned to work.       Susana Fernandez, RN   4/3/20

## 2020-04-09 LAB
3-OH-COTININE: <2 NG/ML
COTININE: <2 NG/ML
NICOTINE: <2 NG/ML

## 2020-04-10 ENCOUNTER — TELEMEDICINE (OUTPATIENT)
Dept: BARIATRICS/WEIGHT MGMT | Age: 35
End: 2020-04-10
Payer: COMMERCIAL

## 2020-04-10 ENCOUNTER — INITIAL CONSULT (OUTPATIENT)
Dept: BARIATRICS/WEIGHT MGMT | Age: 35
End: 2020-04-10
Payer: COMMERCIAL

## 2020-04-10 ENCOUNTER — TELEPHONE (OUTPATIENT)
Dept: NEUROLOGY | Age: 35
End: 2020-04-10

## 2020-04-10 ENCOUNTER — TELEPHONE (OUTPATIENT)
Dept: BARIATRICS/WEIGHT MGMT | Age: 35
End: 2020-04-10

## 2020-04-10 PROCEDURE — 90837 PSYTX W PT 60 MINUTES: CPT | Performed by: SOCIAL WORKER

## 2020-04-10 PROCEDURE — 99213 OFFICE O/P EST LOW 20 MIN: CPT | Performed by: SURGERY

## 2020-04-10 PROCEDURE — 99999 PR OFFICE/OUTPT VISIT,PROCEDURE ONLY: CPT

## 2020-04-10 RX ORDER — ERGOCALCIFEROL 1.25 MG/1
50000 CAPSULE ORAL
Qty: 24 CAPSULE | Refills: 1 | Status: SHIPPED
Start: 2020-04-13 | End: 2022-02-23

## 2020-04-10 NOTE — PROGRESS NOTES
eating behaviors and to make some changes. She stated that prepping food and adjusting to changes in schedules and routines will be important to her going forward. She shared that she felt frustrated by challenges but \"felt good\" that she was able to \"get back on track\". Mental Status Exam: appearance:  appropriately dressed, behavior:  normal, attitude:  cooperative, speech:  appropriate, mood:  euthymic, affect:  congruent with mood, thought content:  no evidence of psychosis, thought process:  logical and coherent, orientation:  oriented in all spheres, memory:  recent:  good and remote:  good, insight:  good , judgment:  good  and cognitive:  intact and intelligent    RISK ASSESSMENT    Suicide screen: denies current suicidal ideation, plan and intent    Self Injurious Behavior: denies    Homicide screen: denies current homicidal ideation, plan and intent    TREATMENT PLAN:  Goal: Increase understanding of role of emotional factors contributing to issues with food and obesity and strategies to cope. Interventions in session:  Reviewed \"Why We Eat\" and \"Reality Journal\" and processed pt. insights. Provided psychoeducation regarding cravings, strategies for dealing with cravings, head hunger vs physical hunger\" and provided handout Identifying and Handling Cravings\". Assignments/Recommendations: Continue follow-up with SW for education further evaluation. Complete entries in [address]. Attend [facility] support group. Follow up with present providers/all scheduled appointment at [facility]. Next steps: Schedule follow up with me for  60MIN in 2 weeks    Bariatric Surgery: Based on the information gathered through the interview process - there is no current evidence of mental health or substance abuse issues that would impact on the patient receiving bariatric surgery.     Patient and/or family/guardian verbalizes understanding of and agreement with treatment recommendations and plan: yes    Start time: 8:31         End time: 9:31    Visit Time: [address], [place]IS[place]

## 2020-04-10 NOTE — PATIENT INSTRUCTIONS
Assignments/Recommendations: Continue follow-up with SW for education further evaluation. Complete entries in [address]. Attend [facility] support group. Follow up with present providers/all scheduled appointment at [facility].

## 2020-04-10 NOTE — PROGRESS NOTES
Bariatric Follow Up Note  [facility] or Ibis Banegas MD, MS    Patient's Name/Date of Birth: Judy Beatty / 1985    Date: April 10, 2020     Surgeon: Ashley Dillard MD    Chief Complaint: Vitamin Deficiency, GERD    Patient Active Problem List   Diagnosis    Recurrent acute suppurative otitis media of right ear without spontaneous rupture of tympanic membrane    Acute otitis externa of right ear    Migraine headache    PCOS (polycystic ovarian syndrome)    Vitamin D deficiency    Folic acid deficiency    Chronic tension-type headache, intractable    Diarrhea    Non-intractable vomiting       Subjective: doing well, losing weight, tolerating vit d. No complaints    Objective: There were no vitals taken for this visit. Video visit  Labs:  No results for input(s): WBC, HGB, HCT in the last 72 hours. Invalid input(s): PLR  Lab Results   Component Value Date    CREATININE 0.8 01/23/2020    BUN 15 01/23/2020     01/23/2020    K 4.1 01/23/2020     01/23/2020    CO2 26 01/23/2020     No results for input(s): LIPASE, AMYLASE in the last 72 hours.   Vit D 18    Review of Systems -  General ROS: negative for - chills, fatigue or malaise  ENT ROS: negative for - hearing change, nasal congestion or nasal discharge  Allergy and Immunology ROS: negative for - hives, itchy/watery eyes or nasal congestion  Hematological and Lymphatic ROS: negative for - blood clots, blood transfusions, bruising or fatigue  Endocrine ROS: negative for - malaise/lethargy, mood swings, palpitations or polydipsia/polyuria  Respiratory ROS: negative for - sputum changes, stridor, tachypnea or wheezing  Cardiovascular ROS: negative for - irregular heartbeat, loss of consciousness, murmur or orthopnea  Gastrointestinal ROS: negative for - constipation, diarrhea, gas/bloating, heartburn or hematemesis  Genito-Urinary ROS: negative for -  genital discharge, genital ulcers or hematuria  Musculoskeletal ROS: negative for - gait disturbance, muscle pain or muscular weakness      General appearance:  NAD  Head: NCAT, PERRLA, EOMI, red conjunctiva  Neck: trachea midline  Lungs: Equal chest rise bilateral  Heart: Reg rate  Abdomen: soft, nondistended, nontender  Skin; no lesions  Gu: no bruising  Extremities: atraumatic      Assessment/Plan:  Thalia Zazueta is a 29 y.o. female undergoing medical weight loss management for morbid obesity. Presents for follow up regarding vitamin deficiency and failed weight loss. Vit D from 11 to 18. Stay on the high protein, low calorie diet while waiting for insurance approval. Walk as much as possible but keep your legs elevated when sitting. Continue deep breathing exercizes. Aim for 60 gm Protein and 90 oz of liquids per day. Track protein and fluid intake. Make sure the bowel move daily by taking fiber such as Metamucil. We discussed results and surgery for over 15 minutes.   Cont supplement 50K twice weekly    Physician Signature: Electronically signed by Dr. Lillie Johnson  [phone] (p)  4/10/2020  8:27 AM

## 2020-04-13 ENCOUNTER — CARE COORDINATION (OUTPATIENT)
Dept: CARE COORDINATION | Age: 35
End: 2020-04-13

## 2020-04-23 ENCOUNTER — TELEMEDICINE (OUTPATIENT)
Dept: BARIATRICS/WEIGHT MGMT | Age: 35
End: 2020-04-23
Payer: COMMERCIAL

## 2020-04-23 PROCEDURE — 90837 PSYTX W PT 60 MINUTES: CPT | Performed by: SOCIAL WORKER

## 2020-04-23 NOTE — PROGRESS NOTES
INDIVIDUAL SESSION:  SUMMARY/PSYCH CLEARANCE     Bailee Mcghee is a 29 y.o. ,   female, referred by Primary Care Provider  for evaluation and treatment. Patient identify verified by Name and . This session was provided as a live video telehealth contact due to  [place] 19 restriction on face to face visits. Pt was informed and understands the following: that this live video telehealth contact is being made in lieu of a face to face meeting due to the COVID-19 pandemic; this contact is considered a telehealth services; the same session fees that apply to face to face services apply to telehealth services; the benefits and risks of engaging in telehealth services; the need for reliable and secure Internet/phone connection; and that this is their responsibility to maintain the privacy and security of their device and location. With this information, the client verbally consents to participate in a live video telehealth session. Patient Consent :   SW discussed role and services including limits to confidentiality, documentation in a single EMR with care team, time-limited services, and potential financial responsibility. Patient expressed understanding and is agreeable to same. yes        Those attending session : patient    DX:   Encounter Diagnosis   Name Primary?  Compulsive eating patterns Yes       Chief Complaint   Patient presents with    Consultation     3rd visit follow up for bariatric surgery         Wt Readings from Last 3 Encounters:   20 282 lb (127.9 kg)   20 282 lb (127.9 kg)   20 282 lb (127.9 kg)            Narrative: The PT stated that she has become more aware of emotional eating, triggers to urges and cravings and how being hard on her self as well as stress have affected her eating patterns.  She shared that she has become acclimated  to her knew work situation (place and schedule) and that she  has been able to maintain some

## 2020-05-11 ENCOUNTER — TELEPHONE (OUTPATIENT)
Dept: BARIATRICS/WEIGHT MGMT | Age: 35
End: 2020-05-11

## 2020-05-21 ENCOUNTER — TELEPHONE (OUTPATIENT)
Dept: BARIATRICS/WEIGHT MGMT | Age: 35
End: 2020-05-21

## 2020-05-22 ENCOUNTER — TELEPHONE (OUTPATIENT)
Dept: BARIATRICS/WEIGHT MGMT | Age: 35
End: 2020-05-22

## 2020-05-22 ENCOUNTER — PATIENT MESSAGE (OUTPATIENT)
Dept: BARIATRICS/WEIGHT MGMT | Age: 35
End: 2020-05-22

## 2020-05-22 ENCOUNTER — INITIAL CONSULT (OUTPATIENT)
Dept: BARIATRICS/WEIGHT MGMT | Age: 35
End: 2020-05-22
Payer: COMMERCIAL

## 2020-05-22 PROCEDURE — 99999 PR OFFICE/OUTPT VISIT,PROCEDURE ONLY: CPT | Performed by: DIETITIAN, REGISTERED

## 2020-05-28 ENCOUNTER — TELEPHONE (OUTPATIENT)
Dept: BARIATRICS/WEIGHT MGMT | Age: 35
End: 2020-05-28

## 2020-05-28 NOTE — LETTER
Medical Clearance/Medical Necessity for Maty-en-Y Gastric Bypass    Name: Cj Winston                          YOB: 1985    I have been caring for the above patient for _____ years. He/she suffers from the following medical conditions:  __________________________________________________________________________________________________________________________________________________________________________________________________________________    The above medical conditions are either caused by or worsened by the patients morbid obesity. Yes_________ No__________      The above medical conditions are currently stable:      Yes_________ No__________    The following medical conditions are difficult to manage/require multiple medications to achieve control due to the patients weight: ______________________________________________________________________  ______________________________________________________________________                    The above patient has participated in the following medical weight loss efforts without long-term weight reduction:  ____________________________________________________________________________________________________________________________________________    I am in support of my patient undergoing a weight loss surgical procedure with [facility] and the patient understands the risks and benefits of weight loss surgery. The patient has reasonable expectations and I believe the patient will be compliant with all post-surgical requirements. I understand the program is comprehensive with dedicated and specially trained staff. In the event that  my patient is over the age of 61, I would like to state that the patients physiological age and co-morbid conditions result in a positive risk to benefit ratio.

## 2020-05-28 NOTE — TELEPHONE ENCOUNTER
Per order of Dr. Roxy Latif patient is ready to be scheduled for LSG conversion to LRYGB. Call placed to patient and message left to recall. Patient recalled and wants her surgery to be done on 06/30/2020. She has her weight check appointment and 2 hour class scheduled. She has seen her PCP recently. I will fax for final medical clearance she will follow up with the office. COVID testing was discussed and she is agreeable. We discussed at length all med's to avoid 2 weeks prior to surgery surgery and she voiced understanding. She does not smoke and drinks alcohol  very little. She will refrain until surgery. I will mail her pre op letter along with copy of the low fat diet. Call placed to surgery schedulers and patient placed on [facility] calendar. Post op appointment set.

## 2020-06-05 ENCOUNTER — OFFICE VISIT (OUTPATIENT)
Dept: FAMILY MEDICINE CLINIC | Age: 35
End: 2020-06-05
Payer: COMMERCIAL

## 2020-06-05 ENCOUNTER — HOSPITAL ENCOUNTER (OUTPATIENT)
Age: 35
Discharge: HOME | End: 2020-06-07
Payer: COMMERCIAL

## 2020-06-05 VITALS
OXYGEN SATURATION: 97 % | TEMPERATURE: 97.8 F | HEART RATE: 66 BPM | DIASTOLIC BLOOD PRESSURE: 64 MMHG | SYSTOLIC BLOOD PRESSURE: 110 MMHG

## 2020-06-05 LAB — VITAMIN D 25-HYDROXY: 36 NG/ML (ref 30–100)

## 2020-06-05 PROCEDURE — 99213 OFFICE O/P EST LOW 20 MIN: CPT | Performed by: INTERNAL MEDICINE

## 2020-06-05 PROCEDURE — 82306 VITAMIN D 25 HYDROXY: CPT

## 2020-06-05 PROCEDURE — 36415 COLL VENOUS BLD VENIPUNCTURE: CPT

## 2020-06-05 RX ORDER — TIZANIDINE 2 MG/1
2 TABLET ORAL EVERY 8 HOURS PRN
Qty: 10 TABLET | Refills: 0 | Status: SHIPPED
Start: 2020-06-05 | End: 2020-06-16 | Stop reason: ALTCHOICE

## 2020-06-09 ENCOUNTER — INITIAL CONSULT (OUTPATIENT)
Dept: BARIATRICS/WEIGHT MGMT | Age: 35
End: 2020-06-09
Payer: COMMERCIAL

## 2020-06-09 VITALS — WEIGHT: 288 LBS | HEIGHT: 67 IN | BODY MASS INDEX: 45.2 KG/M2

## 2020-06-09 PROCEDURE — 99999 PR OFFICE/OUTPT VISIT,PROCEDURE ONLY: CPT | Performed by: DIETITIAN, REGISTERED

## 2020-06-09 NOTE — PROGRESS NOTES
PO), Take 1 tablet by mouth daily, Disp: , Rfl:     omeprazole (PRILOSEC) 20 MG delayed release capsule, Take 20 mg by mouth 2 times daily , Disp: , Rfl:   _    Please check all that apply:  No - Patient lost 10% of excess body weight prior to surgery. Pt has H&P with Dr. Neno Peterson on 6/19/20 - Pt is aware this is when she needs to be at her pre-op weight loss goal  Yes - Patient  is able to verbalize a Bariatric Full Liquid Diet. Yes - Patient is able to verbalize the usage & importance of Protein Supplements. Yes - Patient purchased 3 month supply of protein vitamins and calcium. YES - Patient is instructed to follow a low-fat diet from now until surgery date. YES - Patient is instructed to take 30 grams of a protein supplement from  now until surgery date in addition to low-fat diet guidelines. YES - Patient is instructed to consume 64 ounces of water daily from now until surgery date.  ________________________________________________________________________  Yes - Patient did not lose 10% of excess body weight prior to surgery  - Patient has to follow all liquid protein fast and return to [facility] for weigh-in on 6/19/20  - At weigh in appointment patient must weigh 278 lbs or surgery can be cancelled. ________________________________________________________________________  Yes - Patient did  purchase 3 month supply of protein, vitamins and Calcium.     Comments:   [facility] Supplements

## 2020-06-10 ENCOUNTER — VIRTUAL VISIT (OUTPATIENT)
Dept: BARIATRICS/WEIGHT MGMT | Age: 35
End: 2020-06-10
Payer: COMMERCIAL

## 2020-06-10 PROCEDURE — 99999 PR OFFICE/OUTPT VISIT,PROCEDURE ONLY: CPT | Performed by: DIETITIAN, REGISTERED

## 2020-06-10 NOTE — PROGRESS NOTES
Patient attended a 2 Hour Initial Nutrition Consult Class for RYGB on 6/10/20. Patient was able to verbalize understanding of the class.

## 2020-06-10 NOTE — LETTER
[facility]   [address]  Phone: [phone]  Fax: [phone]    Jefferson James RD, LD        Venus 10, 2020    Yeni Randall-Covert  [facility]      Dear Nikko Cruz:        I personally wanted to thank you for selecting [facility] as your center of choice for surgery. I wanted to take the time to let you know it means a lot to me to be able to work with you both before and after surgery and I am so glad that you will become part of our surgical family. It has been a privilege to get to know you and become part of your new journey on life. I look forward to working with you in the future and helping you achieve your new goals. I can't wait to see the growth and transformation that occurs for you after your surgery. If at any time you have any questions you can always contact me [phone].      Respectfully,          Jefferson James RDN/ MARLENE  Bariatric Dietitian  [facility]  Certified In Adult Weight Management I and II  Certified In Pediatric and Adolescent Weight Management        Jefferson James RD, MARLENE

## 2020-06-16 ENCOUNTER — HOSPITAL ENCOUNTER (OUTPATIENT)
Dept: PREADMISSION TESTING | Age: 35
Discharge: HOME | End: 2020-06-16
Payer: COMMERCIAL

## 2020-06-16 ENCOUNTER — OFFICE VISIT (OUTPATIENT)
Dept: PODIATRY | Age: 35
End: 2020-06-16
Payer: COMMERCIAL

## 2020-06-16 VITALS
BODY MASS INDEX: 45.52 KG/M2 | HEART RATE: 70 BPM | RESPIRATION RATE: 18 BRPM | DIASTOLIC BLOOD PRESSURE: 67 MMHG | TEMPERATURE: 97.9 F | WEIGHT: 290 LBS | HEIGHT: 67 IN | SYSTOLIC BLOOD PRESSURE: 123 MMHG | OXYGEN SATURATION: 98 %

## 2020-06-16 VITALS
HEIGHT: 67 IN | SYSTOLIC BLOOD PRESSURE: 123 MMHG | BODY MASS INDEX: 45.61 KG/M2 | RESPIRATION RATE: 18 BRPM | DIASTOLIC BLOOD PRESSURE: 67 MMHG | TEMPERATURE: 97.9 F | WEIGHT: 290.56 LBS | HEART RATE: 70 BPM | OXYGEN SATURATION: 98 %

## 2020-06-16 LAB
ALBUMIN SERPL-MCNC: 4 G/DL (ref 3.5–5.2)
ALP BLD-CCNC: 63 U/L (ref 35–104)
ALT SERPL-CCNC: 25 U/L (ref 0–32)
ANION GAP SERPL CALCULATED.3IONS-SCNC: 12 MMOL/L (ref 7–16)
AST SERPL-CCNC: 17 U/L (ref 0–31)
BILIRUB SERPL-MCNC: 0.5 MG/DL (ref 0–1.2)
BUN BLDV-MCNC: 19 MG/DL (ref 6–20)
CALCIUM SERPL-MCNC: 9.1 MG/DL (ref 8.6–10.2)
CHLORIDE BLD-SCNC: 99 MMOL/L (ref 98–107)
CO2: 25 MMOL/L (ref 22–29)
CREAT SERPL-MCNC: 0.8 MG/DL (ref 0.5–1)
GFR AFRICAN AMERICAN: >60
GFR NON-AFRICAN AMERICAN: >60 ML/MIN/1.73
GLUCOSE BLD-MCNC: 86 MG/DL (ref 74–99)
HCT VFR BLD CALC: 41.4 % (ref 34–48)
HEMOGLOBIN: 13.3 G/DL (ref 11.5–15.5)
MCH RBC QN AUTO: 28.9 PG (ref 26–35)
MCHC RBC AUTO-ENTMCNC: 32.1 % (ref 32–34.5)
MCV RBC AUTO: 89.8 FL (ref 80–99.9)
PDW BLD-RTO: 12.8 FL (ref 11.5–15)
PLATELET # BLD: 240 E9/L (ref 130–450)
PMV BLD AUTO: 9.7 FL (ref 7–12)
POTASSIUM SERPL-SCNC: 3.6 MMOL/L (ref 3.5–5)
RBC # BLD: 4.61 E12/L (ref 3.5–5.5)
SODIUM BLD-SCNC: 136 MMOL/L (ref 132–146)
TOTAL PROTEIN: 7.1 G/DL (ref 6.4–8.3)
WBC # BLD: 10.2 E9/L (ref 4.5–11.5)

## 2020-06-16 PROCEDURE — 99213 OFFICE O/P EST LOW 20 MIN: CPT | Performed by: PODIATRIST

## 2020-06-16 PROCEDURE — 36415 COLL VENOUS BLD VENIPUNCTURE: CPT

## 2020-06-16 PROCEDURE — 80053 COMPREHEN METABOLIC PANEL: CPT

## 2020-06-16 PROCEDURE — 85027 COMPLETE CBC AUTOMATED: CPT

## 2020-06-16 RX ORDER — DEXAMETHASONE SODIUM PHOSPHATE 4 MG/ML
4 INJECTION, SOLUTION INTRA-ARTICULAR; INTRALESIONAL; INTRAMUSCULAR; INTRAVENOUS; SOFT TISSUE ONCE
Status: CANCELLED | OUTPATIENT
Start: 2020-06-16 | End: 2020-06-16

## 2020-06-16 RX ORDER — METHYLPREDNISOLONE ACETATE 40 MG/ML
40 INJECTION, SUSPENSION INTRA-ARTICULAR; INTRALESIONAL; INTRAMUSCULAR; SOFT TISSUE ONCE
Status: CANCELLED | OUTPATIENT
Start: 2020-06-16 | End: 2020-06-16

## 2020-06-16 RX ORDER — LIDOCAINE HYDROCHLORIDE 10 MG/ML
1 INJECTION, SOLUTION INFILTRATION; PERINEURAL ONCE
Status: CANCELLED | OUTPATIENT
Start: 2020-06-16 | End: 2020-06-16

## 2020-06-19 ENCOUNTER — OFFICE VISIT (OUTPATIENT)
Dept: BARIATRICS/WEIGHT MGMT | Age: 35
End: 2020-06-19
Payer: COMMERCIAL

## 2020-06-19 VITALS
WEIGHT: 286 LBS | RESPIRATION RATE: 20 BRPM | DIASTOLIC BLOOD PRESSURE: 62 MMHG | HEIGHT: 67 IN | TEMPERATURE: 97.6 F | HEART RATE: 73 BPM | BODY MASS INDEX: 44.89 KG/M2 | SYSTOLIC BLOOD PRESSURE: 117 MMHG

## 2020-06-19 PROCEDURE — 99214 OFFICE O/P EST MOD 30 MIN: CPT | Performed by: SURGERY

## 2020-06-19 PROCEDURE — 99212 OFFICE O/P EST SF 10 MIN: CPT

## 2020-06-19 RX ORDER — GABAPENTIN 100 MG/1
100 CAPSULE ORAL 2 TIMES DAILY
Qty: 10 CAPSULE | Refills: 0 | Status: SHIPPED
Start: 2020-06-19 | End: 2020-07-10

## 2020-06-19 RX ORDER — OMEPRAZOLE 20 MG/1
20 CAPSULE, DELAYED RELEASE ORAL DAILY
Qty: 30 CAPSULE | Refills: 12 | Status: SHIPPED
Start: 2020-06-19 | End: 2020-07-10

## 2020-06-19 RX ORDER — ONDANSETRON 4 MG/1
4 TABLET, ORALLY DISINTEGRATING ORAL EVERY 8 HOURS PRN
Qty: 10 TABLET | Refills: 0 | Status: SHIPPED
Start: 2020-06-19 | End: 2020-10-02

## 2020-06-19 NOTE — PROGRESS NOTES
negative for - constipation, diarrhea, gas/bloating, heartburn or hematemesis  Genito-Urinary ROS: negative for -  genital discharge, genital ulcers or hematuria  Musculoskeletal ROS: negative for - gait disturbance, muscle pain or muscular weakness  Psychiatric ROS: negative for - visual or auditory hallucinations, suicidal ideation      Physical Exam:   Vitals: /62 (Site: Right Lower Arm, Position: Sitting, Cuff Size: Large Adult)   Pulse 73   Temp 97.6 °F (36.4 °C) (Temporal)   Resp 20   Ht 5' 7\" (1.702 m)   Wt 286 lb (129.7 kg)   BMI 44.79 kg/m²     General appearance: AAO, NAD  Eyes: PERRL, EOMI, red conjunctiva  Lungs: CTAB, no wheeze, no rhonchi  Chest wall: atraumatic, no tenderness, no echymosis or abrasions  Heart: reg rate, no murmur  Abdomen: soft, nondistended, nontender  Extremities: full ROM all 4 ext, no gross motor or sensory deficits  Pulses: 2+ distal  Skin: warm and dry  Neurologic: spontanous eye opening, purposeful, follows complex commands  Psych: No hallucinations      Assessment:  Morbid obesity with failure of conservative therapy. Patient has been cleared psychologically and medically. The gall bladder is absent. Upper endoscopy showed large 4cm hiatal hernia. The patient was informed that risks include, but are not limited to: death, anastomotic leak, obstruction, bleeding, and sepsis. Any of these could require further surgery. Other risks include heart attack, DVT, PE, pneumonia, hernia, wound infection, the need for dilatations of the gastrojejunostomy, and the inability to lose appropriate weight and keep it off. We may not be able to do a Gastic Bypass, in that case we will do a Sleeve Gastrectomy. We discussed that our goal is to ameliorate the medical problems and not to obtain a specific body mass index. She understands the risks and benefits and wishes to proceed with the procedure. She has signed a consent form.        Plan:  ([id number]) Laparoscopic robot assisted conversion Maty-en-Y Gastric Bypass possible hiatal hernia repair. She does not need Lovenox post-op.     Physician Signature: Electronically signed by Dr. Vy Mata MD    Send copy of H&P to PCP, Eladio Booth DO

## 2020-06-25 ENCOUNTER — HOSPITAL ENCOUNTER (OUTPATIENT)
Age: 35
Discharge: HOME | End: 2020-06-27
Payer: COMMERCIAL

## 2020-06-25 PROCEDURE — U0003 INFECTIOUS AGENT DETECTION BY NUCLEIC ACID (DNA OR RNA); SEVERE ACUTE RESPIRATORY SYNDROME CORONAVIRUS 2 (SARS-COV-2) (CORONAVIRUS DISEASE [COVID-19]), AMPLIFIED PROBE TECHNIQUE, MAKING USE OF HIGH THROUGHPUT TECHNOLOGIES AS DESCRIBED BY CMS-2020-01-R: HCPCS

## 2020-06-26 PROBLEM — N83.201 RIGHT OVARIAN CYST: Status: ACTIVE | Noted: 2020-06-26

## 2020-06-26 PROBLEM — N94.6 DYSMENORRHEA: Status: ACTIVE | Noted: 2020-06-26

## 2020-06-27 LAB
SARS-COV-2: NOT DETECTED
SOURCE: NORMAL

## 2020-06-30 ENCOUNTER — ANESTHESIA (OUTPATIENT)
Dept: OPERATING ROOM | Age: 35
DRG: 621 | End: 2020-06-30
Payer: COMMERCIAL

## 2020-06-30 ENCOUNTER — ANESTHESIA EVENT (OUTPATIENT)
Dept: OPERATING ROOM | Age: 35
DRG: 621 | End: 2020-06-30
Payer: COMMERCIAL

## 2020-06-30 ENCOUNTER — HOSPITAL ENCOUNTER (INPATIENT)
Age: 35
LOS: 1 days | Discharge: HOME | DRG: 621 | End: 2020-07-01
Attending: SURGERY | Admitting: SURGERY
Payer: COMMERCIAL

## 2020-06-30 VITALS
SYSTOLIC BLOOD PRESSURE: 120 MMHG | RESPIRATION RATE: 26 BRPM | DIASTOLIC BLOOD PRESSURE: 72 MMHG | OXYGEN SATURATION: 100 %

## 2020-06-30 PROBLEM — K21.00 GERD WITH ESOPHAGITIS: Status: ACTIVE | Noted: 2020-06-30

## 2020-06-30 LAB — HCG(URINE) PREGNANCY TEST: NEGATIVE

## 2020-06-30 PROCEDURE — 7100000001 HC PACU RECOVERY - ADDTL 15 MIN: Performed by: SURGERY

## 2020-06-30 PROCEDURE — 15734 MUSCLE-SKIN GRAFT TRUNK: CPT | Performed by: SURGERY

## 2020-06-30 PROCEDURE — 0DB60Z3 EXCISION OF STOMACH, OPEN APPROACH, VERTICAL: ICD-10-PCS | Performed by: SURGERY

## 2020-06-30 PROCEDURE — 43644 LAP GASTRIC BYPASS/ROUX-EN-Y: CPT | Performed by: SURGERY

## 2020-06-30 PROCEDURE — 2709999900 HC NON-CHARGEABLE SUPPLY: Performed by: SURGERY

## 2020-06-30 PROCEDURE — 6360000002 HC RX W HCPCS

## 2020-06-30 PROCEDURE — 6360000002 HC RX W HCPCS: Performed by: SURGERY

## 2020-06-30 PROCEDURE — 6370000000 HC RX 637 (ALT 250 FOR IP): Performed by: SURGERY

## 2020-06-30 PROCEDURE — 3600000015 HC SURGERY LEVEL 5 ADDTL 15MIN: Performed by: SURGERY

## 2020-06-30 PROCEDURE — 81025 URINE PREGNANCY TEST: CPT

## 2020-06-30 PROCEDURE — 2500000003 HC RX 250 WO HCPCS: Performed by: REGISTERED NURSE

## 2020-06-30 PROCEDURE — 3600000005 HC SURGERY LEVEL 5 BASE: Performed by: SURGERY

## 2020-06-30 PROCEDURE — 88305 TISSUE EXAM BY PATHOLOGIST: CPT

## 2020-06-30 PROCEDURE — 0D164ZA BYPASS STOMACH TO JEJUNUM, PERCUTANEOUS ENDOSCOPIC APPROACH: ICD-10-PCS | Performed by: SURGERY

## 2020-06-30 PROCEDURE — 7100000000 HC PACU RECOVERY - FIRST 15 MIN: Performed by: SURGERY

## 2020-06-30 PROCEDURE — 0BQT4ZZ REPAIR DIAPHRAGM, PERCUTANEOUS ENDOSCOPIC APPROACH: ICD-10-PCS | Performed by: SURGERY

## 2020-06-30 PROCEDURE — 43281 LAP PARAESOPHAG HERN REPAIR: CPT | Performed by: SURGERY

## 2020-06-30 PROCEDURE — 3700000001 HC ADD 15 MINUTES (ANESTHESIA): Performed by: SURGERY

## 2020-06-30 PROCEDURE — 2580000003 HC RX 258: Performed by: SURGERY

## 2020-06-30 PROCEDURE — 2500000003 HC RX 250 WO HCPCS: Performed by: SURGERY

## 2020-06-30 PROCEDURE — 3700000000 HC ANESTHESIA ATTENDED CARE: Performed by: SURGERY

## 2020-06-30 PROCEDURE — 6360000002 HC RX W HCPCS: Performed by: REGISTERED NURSE

## 2020-06-30 PROCEDURE — 1200000000 HC SEMI PRIVATE

## 2020-06-30 PROCEDURE — C9113 INJ PANTOPRAZOLE SODIUM, VIA: HCPCS | Performed by: SURGERY

## 2020-06-30 PROCEDURE — 6360000002 HC RX W HCPCS: Performed by: ANESTHESIOLOGY

## 2020-06-30 PROCEDURE — 2720000010 HC SURG SUPPLY STERILE: Performed by: SURGERY

## 2020-06-30 RX ORDER — LIDOCAINE HYDROCHLORIDE 20 MG/ML
INJECTION, SOLUTION INTRAVENOUS PRN
Status: DISCONTINUED | OUTPATIENT
Start: 2020-06-30 | End: 2020-06-30 | Stop reason: SDUPTHER

## 2020-06-30 RX ORDER — BUPIVACAINE HYDROCHLORIDE AND EPINEPHRINE 2.5; 5 MG/ML; UG/ML
INJECTION, SOLUTION EPIDURAL; INFILTRATION; INTRACAUDAL; PERINEURAL PRN
Status: DISCONTINUED | OUTPATIENT
Start: 2020-06-30 | End: 2020-06-30 | Stop reason: HOSPADM

## 2020-06-30 RX ORDER — ACETAMINOPHEN 160 MG/5ML
650 SUSPENSION, ORAL (FINAL DOSE FORM) ORAL EVERY 4 HOURS PRN
Status: DISCONTINUED | OUTPATIENT
Start: 2020-06-30 | End: 2020-07-01 | Stop reason: HOSPADM

## 2020-06-30 RX ORDER — ROCURONIUM BROMIDE 10 MG/ML
INJECTION, SOLUTION INTRAVENOUS PRN
Status: DISCONTINUED | OUTPATIENT
Start: 2020-06-30 | End: 2020-06-30 | Stop reason: SDUPTHER

## 2020-06-30 RX ORDER — MIDAZOLAM HYDROCHLORIDE 1 MG/ML
INJECTION INTRAMUSCULAR; INTRAVENOUS PRN
Status: DISCONTINUED | OUTPATIENT
Start: 2020-06-30 | End: 2020-06-30 | Stop reason: SDUPTHER

## 2020-06-30 RX ORDER — NEOSTIGMINE METHYLSULFATE 1 MG/ML
INJECTION, SOLUTION INTRAVENOUS PRN
Status: DISCONTINUED | OUTPATIENT
Start: 2020-06-30 | End: 2020-06-30 | Stop reason: SDUPTHER

## 2020-06-30 RX ORDER — OXYCODONE HCL 20 MG/ML
5 CONCENTRATE, ORAL ORAL EVERY 4 HOURS PRN
Status: DISCONTINUED | OUTPATIENT
Start: 2020-06-30 | End: 2020-07-01 | Stop reason: HOSPADM

## 2020-06-30 RX ORDER — MORPHINE SULFATE 2 MG/ML
2 INJECTION, SOLUTION INTRAMUSCULAR; INTRAVENOUS
Status: DISCONTINUED | OUTPATIENT
Start: 2020-06-30 | End: 2020-07-01 | Stop reason: HOSPADM

## 2020-06-30 RX ORDER — PROMETHAZINE HYDROCHLORIDE 25 MG/ML
6.25 INJECTION, SOLUTION INTRAMUSCULAR; INTRAVENOUS EVERY 6 HOURS PRN
Status: DISCONTINUED | OUTPATIENT
Start: 2020-06-30 | End: 2020-07-01 | Stop reason: HOSPADM

## 2020-06-30 RX ORDER — SODIUM CHLORIDE 0.9 % (FLUSH) 0.9 %
10 SYRINGE (ML) INJECTION PRN
Status: DISCONTINUED | OUTPATIENT
Start: 2020-06-30 | End: 2020-06-30 | Stop reason: HOSPADM

## 2020-06-30 RX ORDER — MEPERIDINE HYDROCHLORIDE 25 MG/ML
12.5 INJECTION INTRAMUSCULAR; INTRAVENOUS; SUBCUTANEOUS EVERY 5 MIN PRN
Status: DISCONTINUED | OUTPATIENT
Start: 2020-06-30 | End: 2020-06-30 | Stop reason: HOSPADM

## 2020-06-30 RX ORDER — PROPRANOLOL HYDROCHLORIDE 10 MG/1
20 TABLET ORAL 2 TIMES DAILY
Status: DISCONTINUED | OUTPATIENT
Start: 2020-06-30 | End: 2020-07-01 | Stop reason: HOSPADM

## 2020-06-30 RX ORDER — SCOLOPAMINE TRANSDERMAL SYSTEM 1 MG/1
1 PATCH, EXTENDED RELEASE TRANSDERMAL
Status: DISCONTINUED | OUTPATIENT
Start: 2020-06-30 | End: 2020-06-30

## 2020-06-30 RX ORDER — DEXAMETHASONE SODIUM PHOSPHATE 10 MG/ML
INJECTION, SOLUTION INTRAMUSCULAR; INTRAVENOUS PRN
Status: DISCONTINUED | OUTPATIENT
Start: 2020-06-30 | End: 2020-06-30 | Stop reason: SDUPTHER

## 2020-06-30 RX ORDER — PANTOPRAZOLE SODIUM 40 MG/10ML
40 INJECTION, POWDER, LYOPHILIZED, FOR SOLUTION INTRAVENOUS DAILY
Status: DISCONTINUED | OUTPATIENT
Start: 2020-06-30 | End: 2020-07-01 | Stop reason: HOSPADM

## 2020-06-30 RX ORDER — GLYCOPYRROLATE 1 MG/5 ML
SYRINGE (ML) INTRAVENOUS PRN
Status: DISCONTINUED | OUTPATIENT
Start: 2020-06-30 | End: 2020-06-30 | Stop reason: SDUPTHER

## 2020-06-30 RX ORDER — HEPARIN SODIUM 5000 [USP'U]/ML
5000 INJECTION, SOLUTION INTRAVENOUS; SUBCUTANEOUS ONCE
Status: COMPLETED | OUTPATIENT
Start: 2020-06-30 | End: 2020-06-30

## 2020-06-30 RX ORDER — MEPERIDINE HYDROCHLORIDE 25 MG/ML
INJECTION INTRAMUSCULAR; INTRAVENOUS; SUBCUTANEOUS
Status: COMPLETED
Start: 2020-06-30 | End: 2020-06-30

## 2020-06-30 RX ORDER — ONDANSETRON 2 MG/ML
4 INJECTION INTRAMUSCULAR; INTRAVENOUS ONCE
Status: COMPLETED | OUTPATIENT
Start: 2020-06-30 | End: 2020-06-30

## 2020-06-30 RX ORDER — SODIUM CHLORIDE, SODIUM LACTATE, POTASSIUM CHLORIDE, CALCIUM CHLORIDE 600; 310; 30; 20 MG/100ML; MG/100ML; MG/100ML; MG/100ML
INJECTION, SOLUTION INTRAVENOUS CONTINUOUS
Status: DISCONTINUED | OUTPATIENT
Start: 2020-06-30 | End: 2020-06-30

## 2020-06-30 RX ORDER — SODIUM CHLORIDE 0.9 % (FLUSH) 0.9 %
10 SYRINGE (ML) INJECTION PRN
Status: DISCONTINUED | OUTPATIENT
Start: 2020-06-30 | End: 2020-07-01 | Stop reason: HOSPADM

## 2020-06-30 RX ORDER — HYOSCYAMINE SULFATE 0.125 MG
125 TABLET,DISINTEGRATING ORAL EVERY 6 HOURS PRN
Status: DISCONTINUED | OUTPATIENT
Start: 2020-06-30 | End: 2020-07-01 | Stop reason: HOSPADM

## 2020-06-30 RX ORDER — PROCHLORPERAZINE EDISYLATE 5 MG/ML
10 INJECTION INTRAMUSCULAR; INTRAVENOUS EVERY 6 HOURS PRN
Status: DISCONTINUED | OUTPATIENT
Start: 2020-06-30 | End: 2020-07-01 | Stop reason: HOSPADM

## 2020-06-30 RX ORDER — PROPOFOL 10 MG/ML
INJECTION, EMULSION INTRAVENOUS PRN
Status: DISCONTINUED | OUTPATIENT
Start: 2020-06-30 | End: 2020-06-30 | Stop reason: SDUPTHER

## 2020-06-30 RX ORDER — KETOROLAC TROMETHAMINE 30 MG/ML
30 INJECTION, SOLUTION INTRAMUSCULAR; INTRAVENOUS EVERY 6 HOURS
Status: DISCONTINUED | OUTPATIENT
Start: 2020-06-30 | End: 2020-07-01 | Stop reason: HOSPADM

## 2020-06-30 RX ORDER — ONDANSETRON 2 MG/ML
4 INJECTION INTRAMUSCULAR; INTRAVENOUS EVERY 6 HOURS PRN
Status: DISCONTINUED | OUTPATIENT
Start: 2020-06-30 | End: 2020-07-01 | Stop reason: HOSPADM

## 2020-06-30 RX ORDER — SODIUM CHLORIDE 9 MG/ML
10 INJECTION INTRAVENOUS DAILY
Status: DISCONTINUED | OUTPATIENT
Start: 2020-06-30 | End: 2020-07-01 | Stop reason: HOSPADM

## 2020-06-30 RX ORDER — ONDANSETRON 2 MG/ML
4 INJECTION INTRAMUSCULAR; INTRAVENOUS
Status: DISCONTINUED | OUTPATIENT
Start: 2020-06-30 | End: 2020-06-30 | Stop reason: HOSPADM

## 2020-06-30 RX ORDER — FENTANYL CITRATE 50 UG/ML
INJECTION, SOLUTION INTRAMUSCULAR; INTRAVENOUS PRN
Status: DISCONTINUED | OUTPATIENT
Start: 2020-06-30 | End: 2020-06-30 | Stop reason: SDUPTHER

## 2020-06-30 RX ORDER — SODIUM CHLORIDE 0.9 % (FLUSH) 0.9 %
10 SYRINGE (ML) INJECTION EVERY 12 HOURS SCHEDULED
Status: DISCONTINUED | OUTPATIENT
Start: 2020-06-30 | End: 2020-06-30 | Stop reason: HOSPADM

## 2020-06-30 RX ORDER — SODIUM CHLORIDE 0.9 % (FLUSH) 0.9 %
10 SYRINGE (ML) INJECTION EVERY 12 HOURS SCHEDULED
Status: DISCONTINUED | OUTPATIENT
Start: 2020-06-30 | End: 2020-07-01 | Stop reason: HOSPADM

## 2020-06-30 RX ORDER — SODIUM CHLORIDE 9 MG/ML
INJECTION, SOLUTION INTRAVENOUS CONTINUOUS
Status: DISCONTINUED | OUTPATIENT
Start: 2020-06-30 | End: 2020-06-30

## 2020-06-30 RX ORDER — SCOLOPAMINE TRANSDERMAL SYSTEM 1 MG/1
1 PATCH, EXTENDED RELEASE TRANSDERMAL
Status: DISCONTINUED | OUTPATIENT
Start: 2020-06-30 | End: 2020-07-01 | Stop reason: HOSPADM

## 2020-06-30 RX ADMIN — MEPERIDINE HYDROCHLORIDE 12.5 MG: 25 INJECTION, SOLUTION INTRAMUSCULAR; INTRAVENOUS; SUBCUTANEOUS at 13:02

## 2020-06-30 RX ADMIN — MORPHINE SULFATE 2 MG: 2 INJECTION, SOLUTION INTRAMUSCULAR; INTRAVENOUS at 14:49

## 2020-06-30 RX ADMIN — Medication 0.6 MG: at 12:34

## 2020-06-30 RX ADMIN — SODIUM CHLORIDE, PRESERVATIVE FREE 10 ML: 5 INJECTION INTRAVENOUS at 15:00

## 2020-06-30 RX ADMIN — SODIUM CHLORIDE: 9 INJECTION, SOLUTION INTRAVENOUS at 11:16

## 2020-06-30 RX ADMIN — SODIUM CHLORIDE: 9 INJECTION, SOLUTION INTRAVENOUS at 12:15

## 2020-06-30 RX ADMIN — SODIUM CHLORIDE, PRESERVATIVE FREE 10 ML: 5 INJECTION INTRAVENOUS at 15:32

## 2020-06-30 RX ADMIN — MEPERIDINE HYDROCHLORIDE 12.5 MG: 25 INJECTION, SOLUTION INTRAMUSCULAR; INTRAVENOUS; SUBCUTANEOUS at 13:08

## 2020-06-30 RX ADMIN — KETOROLAC TROMETHAMINE 30 MG: 30 INJECTION, SOLUTION INTRAMUSCULAR; INTRAVENOUS at 21:55

## 2020-06-30 RX ADMIN — ROCURONIUM BROMIDE 50 MG: 10 SOLUTION INTRAVENOUS at 11:00

## 2020-06-30 RX ADMIN — SODIUM CHLORIDE: 9 INJECTION, SOLUTION INTRAVENOUS at 09:22

## 2020-06-30 RX ADMIN — KETOROLAC TROMETHAMINE 30 MG: 30 INJECTION, SOLUTION INTRAMUSCULAR; INTRAVENOUS at 15:29

## 2020-06-30 RX ADMIN — DEXAMETHASONE SODIUM PHOSPHATE 10 MG: 10 INJECTION, SOLUTION INTRAMUSCULAR; INTRAVENOUS at 11:06

## 2020-06-30 RX ADMIN — SODIUM CHLORIDE, PRESERVATIVE FREE 10 ML: 5 INJECTION INTRAVENOUS at 15:30

## 2020-06-30 RX ADMIN — MORPHINE SULFATE 2 MG: 2 INJECTION, SOLUTION INTRAMUSCULAR; INTRAVENOUS at 21:11

## 2020-06-30 RX ADMIN — LIDOCAINE HYDROCHLORIDE 80 MG: 20 INJECTION, SOLUTION INTRAVENOUS at 11:00

## 2020-06-30 RX ADMIN — OXYCODONE HYDROCHLORIDE 5 MG: 100 SOLUTION ORAL at 17:45

## 2020-06-30 RX ADMIN — FENTANYL CITRATE 100 MCG: 50 INJECTION, SOLUTION INTRAMUSCULAR; INTRAVENOUS at 11:00

## 2020-06-30 RX ADMIN — HYDROMORPHONE HYDROCHLORIDE 0.5 MG: 1 INJECTION, SOLUTION INTRAMUSCULAR; INTRAVENOUS; SUBCUTANEOUS at 12:53

## 2020-06-30 RX ADMIN — ROCURONIUM BROMIDE 10 MG: 10 SOLUTION INTRAVENOUS at 12:10

## 2020-06-30 RX ADMIN — MIDAZOLAM 2 MG: 1 INJECTION INTRAMUSCULAR; INTRAVENOUS at 10:56

## 2020-06-30 RX ADMIN — PANTOPRAZOLE SODIUM 40 MG: 40 INJECTION, POWDER, FOR SOLUTION INTRAVENOUS at 14:54

## 2020-06-30 RX ADMIN — ONDANSETRON 4 MG: 2 INJECTION INTRAMUSCULAR; INTRAVENOUS at 09:23

## 2020-06-30 RX ADMIN — Medication 0.5 MG: at 12:53

## 2020-06-30 RX ADMIN — POTASSIUM CHLORIDE: 2 INJECTION, SOLUTION, CONCENTRATE INTRAVENOUS at 17:33

## 2020-06-30 RX ADMIN — CEFAZOLIN 3 G: 10 INJECTION, POWDER, FOR SOLUTION INTRAVENOUS at 10:56

## 2020-06-30 RX ADMIN — SUGAMMADEX 258 MG: 100 INJECTION, SOLUTION INTRAVENOUS at 12:43

## 2020-06-30 RX ADMIN — Medication 3 MG: at 12:34

## 2020-06-30 RX ADMIN — HEPARIN SODIUM 5000 UNITS: 5000 INJECTION, SOLUTION INTRAVENOUS; SUBCUTANEOUS at 09:23

## 2020-06-30 RX ADMIN — PROPRANOLOL HYDROCHLORIDE 20 MG: 10 TABLET ORAL at 21:11

## 2020-06-30 RX ADMIN — PROPOFOL 200 MG: 10 INJECTION, EMULSION INTRAVENOUS at 11:00

## 2020-06-30 ASSESSMENT — PULMONARY FUNCTION TESTS
PIF_VALUE: 5
PIF_VALUE: 27
PIF_VALUE: 25
PIF_VALUE: 25
PIF_VALUE: 21
PIF_VALUE: 25
PIF_VALUE: 1
PIF_VALUE: 26
PIF_VALUE: 23
PIF_VALUE: 23
PIF_VALUE: 25
PIF_VALUE: 23
PIF_VALUE: 3
PIF_VALUE: 25
PIF_VALUE: 23
PIF_VALUE: 26
PIF_VALUE: 14
PIF_VALUE: 19
PIF_VALUE: 26
PIF_VALUE: 24
PIF_VALUE: 25
PIF_VALUE: 26
PIF_VALUE: 26
PIF_VALUE: 23
PIF_VALUE: 25
PIF_VALUE: 25
PIF_VALUE: 5
PIF_VALUE: 26
PIF_VALUE: 1
PIF_VALUE: 26
PIF_VALUE: 26
PIF_VALUE: 5
PIF_VALUE: 25
PIF_VALUE: 25
PIF_VALUE: 26
PIF_VALUE: 26
PIF_VALUE: 21
PIF_VALUE: 26
PIF_VALUE: 25
PIF_VALUE: 26
PIF_VALUE: 24
PIF_VALUE: 24
PIF_VALUE: 26
PIF_VALUE: 23
PIF_VALUE: 5
PIF_VALUE: 27
PIF_VALUE: 2
PIF_VALUE: 25
PIF_VALUE: 19
PIF_VALUE: 26
PIF_VALUE: 25
PIF_VALUE: 25
PIF_VALUE: 20
PIF_VALUE: 2
PIF_VALUE: 24
PIF_VALUE: 21
PIF_VALUE: 23
PIF_VALUE: 24
PIF_VALUE: 23
PIF_VALUE: 24
PIF_VALUE: 26
PIF_VALUE: 25
PIF_VALUE: 26
PIF_VALUE: 26
PIF_VALUE: 25
PIF_VALUE: 26
PIF_VALUE: 25
PIF_VALUE: 26
PIF_VALUE: 24
PIF_VALUE: 26
PIF_VALUE: 5
PIF_VALUE: 26
PIF_VALUE: 26
PIF_VALUE: 21
PIF_VALUE: 26
PIF_VALUE: 25
PIF_VALUE: 23
PIF_VALUE: 21
PIF_VALUE: 25
PIF_VALUE: 25
PIF_VALUE: 26
PIF_VALUE: 26
PIF_VALUE: 25
PIF_VALUE: 25
PIF_VALUE: 26
PIF_VALUE: 25
PIF_VALUE: 26
PIF_VALUE: 25
PIF_VALUE: 19
PIF_VALUE: 25
PIF_VALUE: 24
PIF_VALUE: 23
PIF_VALUE: 25
PIF_VALUE: 25
PIF_VALUE: 21
PIF_VALUE: 24

## 2020-06-30 ASSESSMENT — PAIN SCALES - GENERAL
PAINLEVEL_OUTOF10: 8
PAINLEVEL_OUTOF10: 3
PAINLEVEL_OUTOF10: 10
PAINLEVEL_OUTOF10: 6
PAINLEVEL_OUTOF10: 6
PAINLEVEL_OUTOF10: 7
PAINLEVEL_OUTOF10: 6
PAINLEVEL_OUTOF10: 10
PAINLEVEL_OUTOF10: 5
PAINLEVEL_OUTOF10: 3
PAINLEVEL_OUTOF10: 0
PAINLEVEL_OUTOF10: 4

## 2020-06-30 ASSESSMENT — PAIN DESCRIPTION - DESCRIPTORS
DESCRIPTORS: ACHING
DESCRIPTORS: ACHING;DISCOMFORT;SORE
DESCRIPTORS: ACHING;CONSTANT
DESCRIPTORS: ACHING
DESCRIPTORS: ACHING

## 2020-06-30 ASSESSMENT — PAIN DESCRIPTION - PAIN TYPE
TYPE: SURGICAL PAIN

## 2020-06-30 ASSESSMENT — PAIN DESCRIPTION - ORIENTATION
ORIENTATION: ANTERIOR
ORIENTATION: MID;LEFT

## 2020-06-30 ASSESSMENT — PAIN DESCRIPTION - LOCATION
LOCATION: ABDOMEN

## 2020-06-30 ASSESSMENT — PAIN DESCRIPTION - ONSET
ONSET: ON-GOING
ONSET: ON-GOING

## 2020-06-30 ASSESSMENT — PAIN DESCRIPTION - FREQUENCY
FREQUENCY: CONTINUOUS

## 2020-06-30 ASSESSMENT — PAIN DESCRIPTION - PROGRESSION
CLINICAL_PROGRESSION: GRADUALLY IMPROVING
CLINICAL_PROGRESSION: NOT CHANGED

## 2020-06-30 ASSESSMENT — PAIN - FUNCTIONAL ASSESSMENT: PAIN_FUNCTIONAL_ASSESSMENT: PREVENTS OR INTERFERES SOME ACTIVE ACTIVITIES AND ADLS

## 2020-06-30 NOTE — OP NOTE
[facility]                Surgical Weight Loss Center    Operative Report  Antoine Gomes MD, MS    DATE OF PROCEDURE: 6/30/2020    SURGEON: Dr. Prabhakar Matta M.D.     Gisele Hester: Mary Cortes (No qualified surgical resident or surgical assistant was available for the case)    PREOPERATIVE DIAGNOSES:   Patient Active Problem List   Diagnosis    Recurrent acute suppurative otitis media of right ear without spontaneous rupture of tympanic membrane    Acute otitis externa of right ear    Migraine headache    PCOS (polycystic ovarian syndrome)    Vitamin D deficiency    Folic acid deficiency    Chronic tension-type headache, intractable    Diarrhea    Non-intractable vomiting    Dysmenorrhea    Right ovarian cyst    GERD with esophagitis       Morbid obesity , large symptomatic paraesophageal hernia , GERD       POSTOPERATIVE DIAGNOSES:   Same      OPERATION:   1) Laparoscopic Conversion Sleeve to Maty-en-Y gastric bypass. 2) Upper endoscopy  3) Repair Large symptomatic Paraesophageal repair  4) Mobilization and placement of myofascial flap           ANESTHESIA: General endotracheal.     ESTIMATED BLOOD LOSS: 10 mL. COMPLICATIONS: None. HISTORY: William Bates is a morbidly-obese 28 y.o.  female, who weighs 284 pounds. She previously had a sleeve gastrectomy at another institution and lost significant amount of weight (112 lbs and regained nearly 30lbs). She has had refractory reflux since her sleeve. The Body mass index is 44.48 kg/m². She has multiple medical problems aggravated by her obesity and a hiatal hernia causing reflux. She wishes to have a gastric bypass to eliminate her reflux and so that she can lose more weight and keep the weight off and have the hiatal hernia repaired to help decrease the reflux problems as well. She understands the extensive risks involved in the surgery and wishes to proceed.      PROCEDURE: The patient was scope fit past the hiatal hernia repair without problems. The scope was pushed into the pouch. Old blood was removed with suction. The pouch was inflated with air. There was no bubbling from any of the staple lines indicating they were airtight and watertight. The anastomosis was open approximately 8 mm. The scope was easily passed through the anastomosis into the jejunum. All of the mucosa looked healthy. The scope was withdrawn. The anastomosis was wrapped with omentum and tacked with a v lock suture. All of the fluid in the abdomen was removed with suction. All of the CO2 was released. The trocars were removed. Skin wounds were closed with 4-0 Monocryl dermal stitches and Derma+flex glue was applied. The needle and sponge count were correct. The patient tolerated the procedure well and went to recovery in stable condition. Dr David Reece was critical in hiatal dissection, crural repair and performing the anastomosis and upper endoscopy. His assistance was critical in completing the procedure.     Physician Signature: Electronically signed by Dr. Kalli Price M.D.

## 2020-06-30 NOTE — H&P
Naga Richards Randall-Covert  6/30/2020  [id number]  [facility]  Surgical Weight Loss Center [facility]               History and Physical  Gastric Bypass     CHIEF COMPLAINT: Morbid obesity, malnutrition, Dyspnea on Exertion, GERD, Degenerative Joint Disease (DJD), Stress Urinary Incontinence and History of Cholelithiasis    HISTORY OF PRESENT ILLNESS: Scott Gonzalez is a morbidly-obese 28 y.o.  female, who weighs 286 lb (129.7 kg). She is 120 pounds over her ideal body weight. The Body mass index is 44.48 kg/m². She has lost 3 pounds over the past several months in preparation for surgery. History of Sleeve in 2017. Now horrible reflux with large hiatal hernia. She has multiple medical problems aggravated by her obesity. She wishes to have a gastric bypass so that she can lose more weight and keep the weight off. I have met with her on 2 different occasions in the Surgical Weight Loss Clinic, where we discussed the surgery in great detail and went over the risks and benefits. She has watched our informational video so she understands all of the extensive risks involved. She states that she understands all of these risks and wishes to proceed. Allergies   Allergen Reactions    Ciprofloxacin Anaphylaxis       No current facility-administered medications on file prior to encounter. Current Outpatient Medications on File Prior to Encounter   Medication Sig Dispense Refill    propranolol (INDERAL) 10 MG tablet Take one 3 times daily, may increase to 2 twice daily in 2 wks. , migraine (Patient taking differently: 20 mg 2 times daily Take one 3 times daily, may increase to 2 twice daily in 2 wks. , migraine) 186 tablet 5    omeprazole (PRILOSEC) 20 MG delayed release capsule Take 20 mg by mouth 2 times daily       vitamin D (ERGOCALCIFEROL) 1.25 MG (47047 UT) CAPS capsule Take 1 capsule by mouth Twice a Week 24 capsule 1    albuterol sulfate HFA (VENTOLIN HFA) 108 (90 Base) MCG/ACT inhaler Inhale 2 puffs into the lungs 4 times daily as needed for Wheezing 1 Inhaler 0    Elagolix Sodium (ORILISSA) 150 MG TABS Take 1 tablet by mouth daily 30 tablet 4    SUMAtriptan (IMITREX) 50 MG tablet TAKE 1/2 TABLET BY MOUTH ONCE FOR ONE DOSE AS NEEDED FOR MIGRAINE 9 tablet 0    Cholecalciferol (VITAMIN D) 50 MCG (2000 UT) CAPS capsule Take 1 capsule by mouth daily 1000 mg      Multiple Vitamins-Minerals (MULTIVITAMIN ADULT PO) Take 1 tablet by mouth daily         Past Medical History:   Diagnosis Date    Allergic rhinitis     Anesthesia complication     pulse ox dropped    Arthritis     Chronic sinusitis     Chronic tension-type headache, intractable 2020    Constipation     Depression with anxiety     Earache     Folic acid deficiency     Migraine headache 2020    Migraines     Morbidly obese (HCC)     PCOS (polycystic ovarian syndrome) 2020    Polycystic ovarian syndrome     Prolonged emergence from general anesthesia     RLS (restless legs syndrome)     Swelling of both lower extremities     Vitamin D deficiency 2020       Past Surgical History:   Procedure Laterality Date    APPENDECTOMY  2003     SECTION  2006    CHOLECYSTECTOMY      COLONOSCOPY      DILATION AND CURETTAGE OF UTERUS      HYSTEROSCOPY  2015    [facility]   LAPAROSCOPY    [facility]    [facility]    TUBAL LIGATION  2014    Abalation    UPPER GASTROINTESTINAL ENDOSCOPY N/A 2020    EGD BIOPSY performed by Phillip Baird MD at [place] [address]       Current Facility-Administered Medications   Medication Dose Route Frequency Provider Last Rate Last Dose    0.9 % sodium chloride infusion   Intravenous Continuous Phillip Baird  mL/hr at 20 3520      sodium chloride flush 0.9 % injection 10 mL  10 mL Intravenous 2 times per day Phillip Baird MD        sodium chloride flush 0.9 % injection 10 mL  10 mL Intravenous PRN Phillip Baird MD Yes     Alcohol/week: 1.0 standard drinks     Types: 1 Glasses of wine per week     Comment: monthly    Drug use: No    Sexual activity: Yes   Lifestyle    Physical activity     Days per week: Not on file     Minutes per session: Not on file    Stress: Not on file   Relationships    Social connections     Talks on phone: Not on file     Gets together: Not on file     Attends Ep[employer] service: Not on file     Active member of club or organization: Not on file     Attends meetings of clubs or organizations: Not on file     Relationship status: Not on file    Intimate partner violence     Fear of current or ex partner: Not on file     Emotionally abused: Not on file     Physically abused: Not on file     Forced sexual activity: Not on file   Other Topics Concern    Not on file   Social History Narrative    Not on file       Review of Systems  General ROS: negative for - chills, fatigue or malaise  ENT ROS: negative for - hearing change, nasal congestion or nasal discharge  Allergy and Immunology ROS: negative for - hives, itchy/watery eyes or nasal congestion  Hematological and Lymphatic ROS: negative for - blood clots, blood transfusions, bruising or fatigue  Endocrine ROS: negative for - malaise/lethargy, mood swings, palpitations or polydipsia/polyuria  Respiratory ROS: negative for - sputum changes, stridor, tachypnea or wheezing  Cardiovascular ROS: negative for - irregular heartbeat, loss of consciousness, murmur or orthopnea  Gastrointestinal ROS: negative for - constipation, diarrhea, gas/bloating, heartburn or hematemesis  Genito-Urinary ROS: negative for -  genital discharge, genital ulcers or hematuria  Musculoskeletal ROS: negative for - gait disturbance, muscle pain or muscular weakness  Psychiatric ROS: negative for - visual or auditory hallucinations, suicidal ideation      Physical Exam:   Vitals: /76   Pulse 74   Temp 97.3 °F (36.3 °C) (Temporal)   Resp 16   Wt 284 lb (128.8 kg) SpO2 98%   BMI 44.48 kg/m²     General appearance: AAO, NAD  Eyes: PERRL, EOMI, red conjunctiva  Lungs: CTAB, no wheeze, no rhonchi  Chest wall: atraumatic, no tenderness, no echymosis or abrasions  Heart: reg rate, no murmur  Abdomen: soft, nondistended, nontender  Extremities: full ROM all 4 ext, no gross motor or sensory deficits  Pulses: 2+ distal  Skin: warm and dry  Neurologic: spontanous eye opening, purposeful, follows complex commands  Psych: No hallucinations      Assessment:  Morbid obesity with failure of conservative therapy. Patient has been cleared psychologically and medically. The gall bladder is absent. Upper endoscopy showed large 4cm hiatal hernia. The patient was informed that risks include, but are not limited to: death, anastomotic leak, obstruction, bleeding, and sepsis. Any of these could require further surgery. Other risks include heart attack, DVT, PE, pneumonia, hernia, wound infection, the need for dilatations of the gastrojejunostomy, and the inability to lose appropriate weight and keep it off. We may not be able to do a Gastic Bypass, in that case we will do a Sleeve Gastrectomy. We discussed that our goal is to ameliorate the medical problems and not to obtain a specific body mass index. She understands the risks and benefits and wishes to proceed with the procedure. She has signed a consent form. Plan:  ([id number]) Laparoscopic robot assisted conversion Maty-en-Y Gastric Bypass possible hiatal hernia repair. She does not need Lovenox post-op. The patient was counseled at length about the risks of tarik Covid-19 during their perioperative period and any recovery window from their procedure. The patient was made aware that tarik Covid-19  may worsen their prognosis for recovering from their procedure  and lend to a higher morbidity and/or mortality risk. All material risks, benefits, and reasonable alternatives including postponing the procedure were discussed. The patient does wish to proceed with the procedure at this time. Physician Signature: Electronically signed by Dr. Denver Kiang name on file.     Send copy of H&P to PCP, Christine Duran DO

## 2020-07-01 ENCOUNTER — TELEPHONE (OUTPATIENT)
Dept: BARIATRICS/WEIGHT MGMT | Age: 35
End: 2020-07-01

## 2020-07-01 VITALS
SYSTOLIC BLOOD PRESSURE: 102 MMHG | HEIGHT: 67 IN | DIASTOLIC BLOOD PRESSURE: 59 MMHG | BODY MASS INDEX: 44.57 KG/M2 | HEART RATE: 70 BPM | WEIGHT: 284 LBS | OXYGEN SATURATION: 96 % | TEMPERATURE: 97.8 F | RESPIRATION RATE: 16 BRPM

## 2020-07-01 LAB
ALBUMIN SERPL-MCNC: 3.3 G/DL (ref 3.5–5.2)
ALP BLD-CCNC: 59 U/L (ref 35–104)
ALT SERPL-CCNC: 22 U/L (ref 0–32)
ANION GAP SERPL CALCULATED.3IONS-SCNC: 12 MMOL/L (ref 7–16)
AST SERPL-CCNC: 20 U/L (ref 0–31)
BASOPHILS ABSOLUTE: 0.02 E9/L (ref 0–0.2)
BASOPHILS RELATIVE PERCENT: 0.1 % (ref 0–2)
BILIRUB SERPL-MCNC: 0.4 MG/DL (ref 0–1.2)
BILIRUBIN DIRECT: <0.2 MG/DL (ref 0–0.3)
BILIRUBIN, INDIRECT: ABNORMAL MG/DL (ref 0–1)
BUN BLDV-MCNC: 8 MG/DL (ref 6–20)
CALCIUM SERPL-MCNC: 8.4 MG/DL (ref 8.6–10.2)
CHLORIDE BLD-SCNC: 103 MMOL/L (ref 98–107)
CO2: 20 MMOL/L (ref 22–29)
CREAT SERPL-MCNC: 0.7 MG/DL (ref 0.5–1)
EOSINOPHILS ABSOLUTE: 0 E9/L (ref 0.05–0.5)
EOSINOPHILS RELATIVE PERCENT: 0 % (ref 0–6)
GFR AFRICAN AMERICAN: >60
GFR NON-AFRICAN AMERICAN: >60 ML/MIN/1.73
GLUCOSE BLD-MCNC: 106 MG/DL (ref 74–99)
HCT VFR BLD CALC: 40.4 % (ref 34–48)
HEMOGLOBIN: 12.7 G/DL (ref 11.5–15.5)
IMMATURE GRANULOCYTES #: 0.04 E9/L
IMMATURE GRANULOCYTES %: 0.3 % (ref 0–5)
LYMPHOCYTES ABSOLUTE: 1.43 E9/L (ref 1.5–4)
LYMPHOCYTES RELATIVE PERCENT: 10.4 % (ref 20–42)
MCH RBC QN AUTO: 29.1 PG (ref 26–35)
MCHC RBC AUTO-ENTMCNC: 31.4 % (ref 32–34.5)
MCV RBC AUTO: 92.7 FL (ref 80–99.9)
MONOCYTES ABSOLUTE: 0.92 E9/L (ref 0.1–0.95)
MONOCYTES RELATIVE PERCENT: 6.7 % (ref 2–12)
NEUTROPHILS ABSOLUTE: 11.35 E9/L (ref 1.8–7.3)
NEUTROPHILS RELATIVE PERCENT: 82.5 % (ref 43–80)
PDW BLD-RTO: 12.8 FL (ref 11.5–15)
PLATELET # BLD: 248 E9/L (ref 130–450)
PMV BLD AUTO: 10.1 FL (ref 7–12)
POTASSIUM REFLEX MAGNESIUM: 4.4 MMOL/L (ref 3.5–5)
RBC # BLD: 4.36 E12/L (ref 3.5–5.5)
SODIUM BLD-SCNC: 135 MMOL/L (ref 132–146)
TOTAL PROTEIN: 6.5 G/DL (ref 6.4–8.3)
WBC # BLD: 13.8 E9/L (ref 4.5–11.5)

## 2020-07-01 PROCEDURE — 6370000000 HC RX 637 (ALT 250 FOR IP): Performed by: SURGERY

## 2020-07-01 PROCEDURE — C9113 INJ PANTOPRAZOLE SODIUM, VIA: HCPCS | Performed by: SURGERY

## 2020-07-01 PROCEDURE — 80048 BASIC METABOLIC PNL TOTAL CA: CPT

## 2020-07-01 PROCEDURE — 6360000002 HC RX W HCPCS: Performed by: SURGERY

## 2020-07-01 PROCEDURE — 80076 HEPATIC FUNCTION PANEL: CPT

## 2020-07-01 PROCEDURE — 2580000003 HC RX 258: Performed by: SURGERY

## 2020-07-01 PROCEDURE — 85025 COMPLETE CBC W/AUTO DIFF WBC: CPT

## 2020-07-01 PROCEDURE — 36415 COLL VENOUS BLD VENIPUNCTURE: CPT

## 2020-07-01 RX ORDER — OXYCODONE HYDROCHLORIDE AND ACETAMINOPHEN 5; 325 MG/1; MG/1
1 TABLET ORAL EVERY 6 HOURS PRN
Qty: 10 TABLET | Refills: 0 | Status: SHIPPED | OUTPATIENT
Start: 2020-07-01 | End: 2020-07-06

## 2020-07-01 RX ORDER — OXYCODONE HYDROCHLORIDE AND ACETAMINOPHEN 325; 5 MG/5ML; MG/5ML
5 SOLUTION ORAL EVERY 4 HOURS PRN
Qty: 50 ML | Refills: 0 | Status: SHIPPED | OUTPATIENT
Start: 2020-07-01 | End: 2020-07-01

## 2020-07-01 RX ORDER — DOCUSATE SODIUM 100 MG/1
100 CAPSULE, LIQUID FILLED ORAL DAILY PRN
Qty: 30 CAPSULE | Refills: 0 | Status: SHIPPED | OUTPATIENT
Start: 2020-07-01 | End: 2020-10-26

## 2020-07-01 RX ADMIN — MORPHINE SULFATE 2 MG: 2 INJECTION, SOLUTION INTRAMUSCULAR; INTRAVENOUS at 05:32

## 2020-07-01 RX ADMIN — OXYCODONE HYDROCHLORIDE 5 MG: 100 SOLUTION ORAL at 00:13

## 2020-07-01 RX ADMIN — PANTOPRAZOLE SODIUM 40 MG: 40 INJECTION, POWDER, FOR SOLUTION INTRAVENOUS at 07:56

## 2020-07-01 RX ADMIN — SODIUM CHLORIDE, PRESERVATIVE FREE 10 ML: 5 INJECTION INTRAVENOUS at 10:09

## 2020-07-01 RX ADMIN — OXYCODONE HYDROCHLORIDE 5 MG: 100 SOLUTION ORAL at 09:25

## 2020-07-01 RX ADMIN — SODIUM CHLORIDE, PRESERVATIVE FREE 10 ML: 5 INJECTION INTRAVENOUS at 07:56

## 2020-07-01 RX ADMIN — ENOXAPARIN SODIUM 40 MG: 40 INJECTION SUBCUTANEOUS at 07:58

## 2020-07-01 RX ADMIN — KETOROLAC TROMETHAMINE 30 MG: 30 INJECTION, SOLUTION INTRAMUSCULAR; INTRAVENOUS at 04:06

## 2020-07-01 RX ADMIN — PROPRANOLOL HYDROCHLORIDE 20 MG: 10 TABLET ORAL at 07:55

## 2020-07-01 RX ADMIN — KETOROLAC TROMETHAMINE 30 MG: 30 INJECTION, SOLUTION INTRAMUSCULAR; INTRAVENOUS at 10:09

## 2020-07-01 ASSESSMENT — PAIN SCALES - GENERAL
PAINLEVEL_OUTOF10: 4
PAINLEVEL_OUTOF10: 6
PAINLEVEL_OUTOF10: 6
PAINLEVEL_OUTOF10: 5
PAINLEVEL_OUTOF10: 7
PAINLEVEL_OUTOF10: 4
PAINLEVEL_OUTOF10: 6

## 2020-07-01 ASSESSMENT — PAIN DESCRIPTION - DESCRIPTORS
DESCRIPTORS: ACHING;DISCOMFORT
DESCRIPTORS: DISCOMFORT

## 2020-07-01 ASSESSMENT — PAIN DESCRIPTION - ONSET: ONSET: ON-GOING

## 2020-07-01 ASSESSMENT — PAIN - FUNCTIONAL ASSESSMENT: PAIN_FUNCTIONAL_ASSESSMENT: PREVENTS OR INTERFERES SOME ACTIVE ACTIVITIES AND ADLS

## 2020-07-01 ASSESSMENT — PAIN DESCRIPTION - LOCATION
LOCATION: ABDOMEN
LOCATION: ABDOMEN

## 2020-07-01 ASSESSMENT — PAIN DESCRIPTION - FREQUENCY: FREQUENCY: CONTINUOUS

## 2020-07-01 ASSESSMENT — PAIN DESCRIPTION - PROGRESSION: CLINICAL_PROGRESSION: GRADUALLY IMPROVING

## 2020-07-01 ASSESSMENT — PAIN DESCRIPTION - PAIN TYPE
TYPE: SURGICAL PAIN
TYPE: SURGICAL PAIN

## 2020-07-01 ASSESSMENT — PAIN DESCRIPTION - ORIENTATION: ORIENTATION: ANTERIOR

## 2020-07-01 NOTE — CARE COORDINATION
7-1- Cm note: spoke to pt for transition of care needs, pt is independent, denies any needs for homegoing, her  is here to take her home.  Electronically signed by Em Mcgee RN on 7/1/2020 at 10:13 AM

## 2020-07-01 NOTE — CONSULTS
Department of Internal Medicine      HISTORY OF PRESENT ILLNESS:      The patient is a 28 y.o. female who presents with having elective laparoscopic Maty-en-Y gastric bypass, paraesophageal hernia repair. Patient is seen postop. Patient has expected postop discomfort. Patient denies any chest pain, dizziness, unusual shortness of breath. Past Medical History:    Past Medical History:   Diagnosis Date    Allergic rhinitis     Anesthesia complication     pulse ox dropped    Arthritis     Chronic sinusitis     Chronic tension-type headache, intractable 2020    Constipation     Depression with anxiety     Earache     Folic acid deficiency 9455    Migraine headache 2020    Migraines     Morbidly obese (HCC)     PCOS (polycystic ovarian syndrome) 2020    Polycystic ovarian syndrome     Prolonged emergence from general anesthesia     RLS (restless legs syndrome)     Swelling of both lower extremities     Vitamin D deficiency 2020     Past Surgical History:    Past Surgical History:   Procedure Laterality Date    APPENDECTOMY       SECTION      CHOLECYSTECTOMY      COLONOSCOPY      DILATION AND CURETTAGE OF UTERUS      [facility]OSCOPY  2015    [facility]   LAPAROSCOPY    [facility]Y      [facility]    TUBAL LIGATION  2014    Abalation    UPPER GASTROINTESTINAL ENDOSCOPY N/A 2020    EGD BIOPSY performed by Taylor Dennis MD at [place] [address]       Medications Prior to Admission:    @  Prior to Admission medications    Medication Sig Start Date End Date Taking? Authorizing Provider   propranolol (INDERAL) 10 MG tablet Take one 3 times daily, may increase to 2 twice daily in 2 wks. , migraine  Patient taking differently: 20 mg 2 times daily Take one 3 times daily, may increase to 2 twice daily in 2 wks. , migraine 20  Yes Audelia Silverman MD   omeprazole (PRILOSEC) 20 MG delayed release capsule Take 20 mg by mouth 2 times daily 2/15/17  Yes Historical Provider, MD   Elagolix Sodium (ORILISSA) 200 MG TABS Take 1 tablet by mouth 2 times daily 6/26/20   Johann Simpson MD   ondansetron (ZOFRAN-ODT) 4 MG disintegrating tablet Take 1 tablet by mouth every 8 hours as needed for Nausea or Vomiting 6/19/20   Marshall Cook MD   omeprazole (PRILOSEC) 20 MG delayed release capsule Take 1 capsule by mouth daily 6/19/20 6/19/21  Marshall Cook MD   gabapentin (NEURONTIN) 100 MG capsule Take 1 capsule by mouth 2 times daily for 5 days.  Intended supply: 90 days 6/19/20 6/24/20  Marshall Cook MD   diclofenac sodium (VOLTAREN) 1 % GEL Apply 2 g topically 4 times daily 6/5/20   Elverna , DO   vitamin D (ERGOCALCIFEROL) 1.25 MG (63637 UT) CAPS capsule Take 1 capsule by mouth Twice a Week 4/13/20   Marshall Cook MD   albuterol sulfate HFA (VENTOLIN HFA) 108 (90 Base) MCG/ACT inhaler Inhale 2 puffs into the lungs 4 times daily as needed for Wheezing 3/26/20   Wilmer Lucas, DO   Elagolix Sodium (ORILISSA) 150 MG TABS Take 1 tablet by mouth daily 2/28/20   Johann Simpson MD   SUMAtriptan (IMITREX) 50 MG tablet TAKE 1/2 TABLET BY MOUTH ONCE FOR ONE DOSE AS NEEDED FOR MIGRAINE 2/5/20   Elverna , DO   Cholecalciferol (VITAMIN D) 50 MCG (2000 UT) CAPS capsule Take 1 capsule by mouth daily 1000 mg    Historical Provider, MD   Multiple Vitamins-Minerals (MULTIVITAMIN ADULT PO) Take 1 tablet by mouth daily    Historical Provider, MD       Allergies:  Ciprofloxacin    Social History:   Social History     Socioeconomic History    Marital status:      Spouse name: Not on file    Number of children: Not on file    Years of education: Not on file    Highest education level: Not on file   Occupational History    Not on file   Social Needs    Financial resource strain: Not on file    Food insecurity     Worry: Not on file     Inability: Not on file    Transportation needs     Medical: Not on file     Non-medical: Not on file   Tobacco Use    Smoking status: Former Smoker     Packs/day: 0.50     Years: 22.00     Pack years: 11.00     Start date:      Last attempt to quit: 2015     Years since quittin.4    Smokeless tobacco: Never Used    Tobacco comment: quit smoking 2015   Substance and Sexual Activity    Alcohol use: Yes     Alcohol/week: 1.0 standard drinks     Types: 1 Glasses of wine per week     Comment: monthly    Drug use: No    Sexual activity: Yes   Lifestyle    Physical activity     Days per week: Not on file     Minutes per session: Not on file    Stress: Not on file   Relationships    Social connections     Talks on phone: Not on file     Gets together: Not on file     Attends Sabianist service: Not on file     Active member of club or organization: Not on file     Attends meetings of clubs or organizations: Not on file     Relationship status: Not on file    Intimate partner violence     Fear of current or ex partner: Not on file     Emotionally abused: Not on file     Physically abused: Not on file     Forced sexual activity: Not on file   Other Topics Concern    Not on file   Social History Narrative    Not on file       Family History:   Family History   Problem Relation Age of Onset    High Blood Pressure Mother     High Cholesterol Mother     Arthritis Father     No Known Problems Sister     Cancer Maternal Grandmother     COPD Maternal Grandmother     Diabetes Maternal Grandmother     Elevated Lipids Maternal Grandmother     Hypertension Maternal Grandmother     Heart Disease Maternal Grandfather     Cancer Maternal Grandfather     Diabetes Maternal Grandfather        REVIEW OF SYSTEMS:    Gen: Patient denies any lightheadedness or dizziness. No LOC or syncope. No fevers or chills. HEENT: No earache, sore throat or nasal congestion. Resp: Denies cough, hemoptysis or sputum production. Cardiac: Denies chest pain, SOB, diaphoresis or palpitations.     GI: + Heartburn. No nausea, vomiting, diarrhea or constipation. No melena or hematochezia. : No urinary complaints, dysuria, hematuria or frequency. MSK: No extremity weakness, paralysis or paresthesias. PHYSICAL EXAM:    Vitals:  BP (!) 102/59   Pulse 70   Temp 97.8 °F (36.6 °C) (Oral)   Resp 16   Ht 5' 7\" (1.702 m)   Wt 284 lb (128.8 kg)   SpO2 96%   BMI 44.48 kg/m²     General:  This is a 28 y.o. yo female who is alert and oriented in NAD  HEENT:  Head is normocephalic and atraumatic, PERRLA, EOMI, mucus membranes moist with no pharyngeal erythema or exudate. Neck:  Supple with no carotid bruits, JVD or thyromegaly.   No cervical adenopathy  CV:  Regular rate and rhythm, no murmurs  Lungs:  Clear to auscultation bilaterally with no wheezes, rales or rhonchi  Abdomen:  + Soft abdomen with tenderness palpation upper abdomen, nondistended, bowel sounds present  Extremities:  No edema, peripheral pulses intact bilaterally  Neuro:  Cranial nerves II-XII grossly intact; motor and sensory function intact with no focal deficits  Skin:  No rashes, lesions or wounds      DATA:  CBC with Differential:    Lab Results   Component Value Date    WBC 13.8 07/01/2020    RBC 4.36 07/01/2020    HGB 12.7 07/01/2020    HCT 40.4 07/01/2020     07/01/2020    MCV 92.7 07/01/2020    MCH 29.1 07/01/2020    MCHC 31.4 07/01/2020    RDW 12.8 07/01/2020    LYMPHOPCT 10.4 07/01/2020    MONOPCT 6.7 07/01/2020    BASOPCT 0.1 07/01/2020    MONOSABS 0.92 07/01/2020    LYMPHSABS 1.43 07/01/2020    EOSABS 0.00 07/01/2020    BASOSABS 0.02 07/01/2020     CMP:    Lab Results   Component Value Date     07/01/2020    K 4.4 07/01/2020     07/01/2020    CO2 20 07/01/2020    BUN 8 07/01/2020    CREATININE 0.7 07/01/2020    GFRAA >60 07/01/2020    LABGLOM >60 07/01/2020    GLUCOSE 106 07/01/2020    PROT 6.5 07/01/2020    LABALBU 3.3 07/01/2020    CALCIUM 8.4 07/01/2020    BILITOT 0.4 07/01/2020    ALKPHOS 59 07/01/2020 AST 20 07/01/2020    ALT 22 07/01/2020     Magnesium:    Lab Results   Component Value Date    MG 2.1 01/24/2020     Phosphorus:    Lab Results   Component Value Date    PHOS 2.8 10/25/2019     PT/INR:  No results found for: PROTIME, INR  Troponin:  No results found for: TROPONINI  U/A:    Lab Results   Component Value Date    COLORU Yellow 02/06/2019    PROTEINU Negative 02/06/2019    PHUR 6.5 02/06/2019    WBCUA 2-5 02/06/2019    RBCUA NONE 02/06/2019    MUCUS Present 02/06/2019    BACTERIA MODERATE 02/06/2019    CLARITYU SL CLOUDY 02/06/2019    SPECGRAV 1.025 02/06/2019    LEUKOCYTESUR Negative 02/06/2019    UROBILINOGEN 1.0 02/06/2019    BILIRUBINUR Negative 02/06/2019    BLOODU Negative 02/06/2019    GLUCOSEU Negative 02/06/2019     ABG:  No results found for: PH, PCO2, PO2, HCO3, BE, THGB, TCO2, O2SAT  HgBA1c:    Lab Results   Component Value Date    LABA1C 5.1 01/23/2020     FLP:    Lab Results   Component Value Date    TRIG 67 01/23/2020    HDL 54 10/25/2019    LDLCALC 114 10/25/2019    LABVLDL 13 10/25/2019     TSH:    Lab Results   Component Value Date    TSH 1.170 10/25/2019     IRON:  No results found for: IRON  LIPASE:  No results found for: LIPASE    ASSESSMENT AND PLAN:      Patient Active Problem List    Diagnosis Date Noted    GERD with esophagitis 06/30/2020    Morbid obesity due to excess calories ([address]     Disruption or dehiscence of closure of muscle or muscle flap     Dysmenorrhea 06/26/2020    Right ovarian cyst 06/26/2020    Diarrhea 03/31/2020    Non-intractable vomiting 03/31/2020    Migraine headache 01/24/2020    PCOS (polycystic ovarian syndrome) 01/24/2020    Vitamin D deficiency 99/75/5219    Folic acid deficiency 67/00/0975    Chronic tension-type headache, intractable 01/24/2020    Recurrent acute suppurative otitis media of right ear without spontaneous rupture of tympanic membrane 11/02/2019    Acute otitis externa of right ear 11/02/2019     1.   Laparoscopic Maty-en-Y gastric bypass 6/30 with repair of large symptomatic paraesophageal hernia  2. Morbid obesity  3. Constipation  4. Anxiety, depressive disorder  5.   History of prior tobacco use    Plan:  Meds reviewed  Monitor heart rate, blood pressure, O2 saturation  Discharge when okay with general surgery    Zulay Cassidy D.O.  7/1/2020  8:24 AM

## 2020-07-01 NOTE — TELEPHONE ENCOUNTER
Pt called in asking about her pain med oxycodone, pt stated she called all the pharmacies did not have the solution and if he could send in the tablets instead. I sent a message to Dr. Dori Troncoso. For Dr. Dori Troncoso to change it to tablets.

## 2020-07-07 ENCOUNTER — HOSPITAL ENCOUNTER (OUTPATIENT)
Age: 35
Discharge: HOME | End: 2020-07-09
Payer: COMMERCIAL

## 2020-07-07 ENCOUNTER — OFFICE VISIT (OUTPATIENT)
Dept: FAMILY MEDICINE CLINIC | Age: 35
End: 2020-07-07
Payer: COMMERCIAL

## 2020-07-07 VITALS
TEMPERATURE: 98 F | SYSTOLIC BLOOD PRESSURE: 108 MMHG | OXYGEN SATURATION: 98 % | HEART RATE: 84 BPM | DIASTOLIC BLOOD PRESSURE: 68 MMHG

## 2020-07-07 LAB
ALBUMIN SERPL-MCNC: 4 G/DL (ref 3.5–5.2)
ALP BLD-CCNC: 69 U/L (ref 35–104)
ALT SERPL-CCNC: 33 U/L (ref 0–32)
ANION GAP SERPL CALCULATED.3IONS-SCNC: 20 MMOL/L (ref 7–16)
AST SERPL-CCNC: 27 U/L (ref 0–31)
BILIRUB SERPL-MCNC: 0.4 MG/DL (ref 0–1.2)
BUN BLDV-MCNC: 18 MG/DL (ref 6–20)
CALCIUM SERPL-MCNC: 9.5 MG/DL (ref 8.6–10.2)
CHLORIDE BLD-SCNC: 98 MMOL/L (ref 98–107)
CO2: 23 MMOL/L (ref 22–29)
CREAT SERPL-MCNC: 0.8 MG/DL (ref 0.5–1)
GFR AFRICAN AMERICAN: >60
GFR NON-AFRICAN AMERICAN: >60 ML/MIN/1.73
GLUCOSE BLD-MCNC: 81 MG/DL (ref 74–99)
HCT VFR BLD CALC: 42.2 % (ref 34–48)
HEMOGLOBIN: 13.5 G/DL (ref 11.5–15.5)
MCH RBC QN AUTO: 28.7 PG (ref 26–35)
MCHC RBC AUTO-ENTMCNC: 32 % (ref 32–34.5)
MCV RBC AUTO: 89.8 FL (ref 80–99.9)
PDW BLD-RTO: 12.4 FL (ref 11.5–15)
PLATELET # BLD: 305 E9/L (ref 130–450)
PMV BLD AUTO: 10 FL (ref 7–12)
POTASSIUM SERPL-SCNC: 3.9 MMOL/L (ref 3.5–5)
RBC # BLD: 4.7 E12/L (ref 3.5–5.5)
SODIUM BLD-SCNC: 141 MMOL/L (ref 132–146)
TOTAL PROTEIN: 7.6 G/DL (ref 6.4–8.3)
WBC # BLD: 7.4 E9/L (ref 4.5–11.5)

## 2020-07-07 PROCEDURE — 99213 OFFICE O/P EST LOW 20 MIN: CPT | Performed by: INTERNAL MEDICINE

## 2020-07-07 PROCEDURE — 80053 COMPREHEN METABOLIC PANEL: CPT

## 2020-07-07 PROCEDURE — 85027 COMPLETE CBC AUTOMATED: CPT

## 2020-07-07 PROCEDURE — 36415 COLL VENOUS BLD VENIPUNCTURE: CPT

## 2020-07-07 NOTE — PROGRESS NOTES
2020     Kenia Mcmullen Randall-Covert (:  1985) is a 28 y.o. female, here for postsurgical follow-up. She had a Maty-en-Y bariatric procedure performed 1 week ago. Within the first day she was up and walking around the hospital room and was discharged the next day. She says she has been doing fine otherwise and slowly advancing the diet. She has not had any nausea or vomiting with this. No diarrhea but has had loose stools. There is one discrepancy on her medicine which is the vitamin D. She is taking it every day but her discharge summary states twice a week. This needs to be addressed by her bariatric surgeon when she sees him on Friday. Chief Complaint   Patient presents with    Post-Op Check         Review of Systems    Prior to Visit Medications    Medication Sig Taking?  Authorizing Provider   docusate sodium (COLACE) 100 MG capsule Take 1 capsule by mouth daily as needed for Constipation Yes Aldo Potter MD   Elagolix Sodium (ORILISSA) 200 MG TABS Take 1 tablet by mouth 2 times daily Yes Gardenia Obando MD   ondansetron (ZOFRAN-ODT) 4 MG disintegrating tablet Take 1 tablet by mouth every 8 hours as needed for Nausea or Vomiting Yes Aldo Potter MD   omeprazole (PRILOSEC) 20 MG delayed release capsule Take 1 capsule by mouth daily Yes Aldo Potter MD   diclofenac sodium (VOLTAREN) 1 % GEL Apply 2 g topically 4 times daily Yes Sergio Never, DO   vitamin D (ERGOCALCIFEROL) 1.25 MG (26047 UT) CAPS capsule Take 1 capsule by mouth Twice a Week Yes Aldo Potter MD   albuterol sulfate HFA (VENTOLIN HFA) 108 (90 Base) MCG/ACT inhaler Inhale 2 puffs into the lungs 4 times daily as needed for Wheezing Yes Wilmar Alarcon,    Elagolix Sodium (ORILISSA) 150 MG TABS Take 1 tablet by mouth daily Yes Gardenia Obando MD   SUMAtriptan (IMITREX) 50 MG tablet TAKE 1/2 TABLET BY MOUTH ONCE FOR ONE DOSE AS NEEDED FOR MIGRAINE Yes Sergio Never, DO   propranolol (INDERAL) 10 MG tablet Take one 3 times daily, may increase to 2 twice daily in 2 wks. , migraine  Patient taking differently: 20 mg 2 times daily Take one 3 times daily, may increase to 2 twice daily in 2 wks. , migraine Yes Jeff Reardon MD   Multiple Vitamins-Minerals (MULTIVITAMIN ADULT PO) Take 1 tablet by mouth daily Yes Historical Provider, MD   omeprazole (PRILOSEC) 20 MG delayed release capsule Take 20 mg by mouth 2 times daily  Yes Historical Provider, MD   gabapentin (NEURONTIN) 100 MG capsule Take 1 capsule by mouth 2 times daily for 5 days.  Intended supply: 90 days  Manjit Jay MD      Allergies   Allergen Reactions    Ciprofloxacin Anaphylaxis       Past Medical History:   Diagnosis Date    Allergic rhinitis     Anesthesia complication     pulse ox dropped    Arthritis     Chronic sinusitis     Chronic tension-type headache, intractable 2020    Constipation     Depression with anxiety     Earache     Folic acid deficiency     Migraine headache 2020    Migraines     Morbidly obese (HCC)     PCOS (polycystic ovarian syndrome) 2020    Polycystic ovarian syndrome     Prolonged emergence from general anesthesia     RLS (restless legs syndrome)     Swelling of both lower extremities     Vitamin D deficiency 2020     Past Surgical History:   Procedure Laterality Date    APPENDECTOMY  2003     SECTION  2006    CHOLECYSTECTOMY      COLONOSCOPY      DILATION AND CURETTAGE OF UTERUS  2013    HYSTEROSCOPY  2015    [facility]   LAPAROSCOPY    SAJI-EN-Y GASTRIC BYPASS N/A 2020    GASTRIC SLEEVE CONVERT TO  SAJI-EN-Y LAPAROSCOPIC performed by Manjit Jay MD at [facility]  2017    [facility]    TUBAL LIGATION  2014    Abalation    UPPER GASTROINTESTINAL ENDOSCOPY N/A 2020    EGD BIOPSY performed by Manjit Jay MD at [address] History     Tobacco Use    Smoking status: Former Smoker Packs/day: 0.50     Years: 22.00     Pack years: 11.00     Start date: 46     Last attempt to quit: 2015     Years since quittin.4    Smokeless tobacco: Never Used    Tobacco comment: quit smoking 2015   Substance Use Topics    Alcohol use: Yes     Alcohol/week: 1.0 standard drinks     Types: 1 Glasses of wine per week     Comment: monthly        Vitals:    20 1041   BP: 108/68   Pulse: 84   Temp: 98 °F (36.7 °C)   SpO2: 98%     Estimated body mass index is 44.48 kg/m² as calculated from the following:    Height as of 20: 5' 7\" (1.702 m). Weight as of 20: 284 lb (128.8 kg). Physical Exam  Constitutional:       Appearance: Normal appearance. HENT:      Head: Normocephalic. Right Ear: Tympanic membrane, ear canal and external ear normal.      Left Ear: Tympanic membrane, ear canal and external ear normal.      Nose: Nose normal.      Mouth/Throat:      Mouth: Mucous membranes are moist.      Pharynx: Oropharynx is clear. Eyes:      Extraocular Movements: Extraocular movements intact. Conjunctiva/sclera: Conjunctivae normal.      Pupils: Pupils are equal, round, and reactive to light. Cardiovascular:      Rate and Rhythm: Normal rate and regular rhythm. Pulmonary:      Effort: Pulmonary effort is normal.      Breath sounds: No wheezing, rhonchi or rales. Abdominal:      General: Abdomen is flat. There is no distension. Palpations: Abdomen is soft. Tenderness: There is no guarding or rebound. Musculoskeletal:      Right lower leg: No edema. Left lower leg: No edema. Lymphadenopathy:      Cervical: No cervical adenopathy. Skin:     General: Skin is warm and dry. Capillary Refill: Capillary refill takes less than 2 seconds. Findings: Bruising present. No erythema or rash. Neurological:      General: No focal deficit present. Mental Status: She is alert. Motor: No weakness.       Gait: Gait normal.   Psychiatric: Mood and Affect: Mood normal.         Behavior: Behavior normal.         ASSESSMENT/PLAN:  Pelon Tucker was seen today for post-op check. Diagnoses and all orders for this visit:    GERD with esophagitis    Nutritional counseling    Vitamin D deficiency    Bariatric surgery status         There are no changes for me to make today. She had laboratory performed this morning for bariatric surgery. She is healing very well in the abdomen and I will see her when she is discharged by the bariatric team.  An electronic signature was used to authenticate this note.     --Matthew Zelaya DO on 7/7/2020 at 11:09 AM

## 2020-07-10 ENCOUNTER — OFFICE VISIT (OUTPATIENT)
Dept: BARIATRICS/WEIGHT MGMT | Age: 35
End: 2020-07-10
Payer: COMMERCIAL

## 2020-07-10 ENCOUNTER — INITIAL CONSULT (OUTPATIENT)
Dept: BARIATRICS/WEIGHT MGMT | Age: 35
End: 2020-07-10
Payer: COMMERCIAL

## 2020-07-10 VITALS — HEIGHT: 67 IN | WEIGHT: 275.5 LBS | BODY MASS INDEX: 43.24 KG/M2

## 2020-07-10 VITALS
SYSTOLIC BLOOD PRESSURE: 111 MMHG | RESPIRATION RATE: 20 BRPM | HEIGHT: 67 IN | BODY MASS INDEX: 43.24 KG/M2 | TEMPERATURE: 98.2 F | WEIGHT: 275.5 LBS | DIASTOLIC BLOOD PRESSURE: 67 MMHG | HEART RATE: 69 BPM

## 2020-07-10 PROCEDURE — 99999 PR OFFICE/OUTPT VISIT,PROCEDURE ONLY: CPT | Performed by: DIETITIAN, REGISTERED

## 2020-07-10 PROCEDURE — 99212 OFFICE O/P EST SF 10 MIN: CPT

## 2020-07-10 PROCEDURE — 99024 POSTOP FOLLOW-UP VISIT: CPT | Performed by: SURGERY

## 2020-07-10 NOTE — PROGRESS NOTES
Chewable Fiber Gummies are you taking daily  - N/A  Yes - Did your surgeon discuss constipation with you? Per pt surgeon has no recommendations  Yes - Did your surgeon discuss increasing water intake? How much water were you instructed to take daily? Pt states surgeon did not have any recommendations   No - Did your surgeon discuss continuing Colace? How much Colace did your surgeon instruct you to take? N/A  No - Did your surgeon discuss stopping Colace? No - Did your surgeon discuss starting Fiber Gummies? How much Fiber Gummies did your surgeon instruct you to take? N/A  Did your surgeon discuss any other medications / supplements to take daily to move your bowels and avoid constipation? No  No Patient was provided today the Constipation Handout  Yes Rd Ld reinforced pt needs to consume the following - Water Intake should be 64- 90 oz, Fiber Chews 15 grams,        No - Hair loss   No - Weight regain       No - Acid Reflux  No - Dumping Syndrome      No - Food gets stuck  Yes - Are you eating solids - should not be eating solids until 6 weeks post-op. not applicable - Night Time Coughing  not applicable -  B Ping Noted  Yes - Are you chewing thoroughly  - Does not take effect until 6 weeks post-op  No - Are you hungry after eating     How many meals a day 6 / Portions Sizes of Meals are 3 oz         Labs: 7/7/20 - ALT 33H    Supplements: Bariatric Advantage Multi-Vitamin 1 tablet 2 times Daily, Bariatric Advantage 29 mgs Iron 1 tablet Daily, Bariatric Advanatge Calcium Lozenges 1 tablet 3 times Daily , Dry Vitamin D3 5,000iu every day    Estimated Daily Nutritional Needs: Based on Bariatric procedure for Wt. Loss  Energy: Will be calculated at Maintenance Stage    Protein: 60 - 80 gms Daily    MNT Plan and Additional Education: RD/LD reviewed Bariatric Puree Diet and how to puree food. Encouraged pt to meet protein and fluid needs daily. Pt. verbalized understanding. Handouts given. Pt.  Instructed to call / LD uses behavioral tools such as goal setting, MI, and self-monitoring, to reinforce the anatomic and physiologic effects of the surgery, so that the described behaviors become more of a habit not simply a reaction to the altered anatomy. Care Plan:   · Rd/Ld Addressed Food Records or 24-Hour Recall  · Rd / Ld encouraged patient to exercise at least 30 minutes daily  · Rd / Ld instructed patient on how to increase oral protein intake within the diet. Pt. can verbalize he / she will need to consume 60 - 80 grams. · Rd / Ld instructed the patient on how to increase the use of protein supplements within the diet. · Pt. was instructed on how to increase water intake. Patient will need to consume 48 - 64 oz. of just plain water in addition to 30 oz. of non-caloric beverages. · Handouts given to patient  · RD / LD encouraged oral intake    · RD / LD encouraged pt. to keep food records.       · Pt. is able to verbalize diet concepts      No - Constipation Handout Given and Reviewed    No - High Fiber Handout Given and Reviewed      No - Ulcer Handout Given and Reviewed          No - Pt. was instructed to continue current MNT   Yes - Pt. diet was advanced to the following stage ( See above)   No - Supplements: initiated (See list below  - Pt. given instruction)     No - Supplemental foods:______________________  No - Pt. was placed on a altered meal schedule    Good - Dietary Compliance

## 2020-07-10 NOTE — PATIENT INSTRUCTIONS
Please continue to take your vitamin and mineral supplements as instructed. If you received a blood work prescription today for laboratory monitoring due prior to your next routine follow-up visit, please have this blood work obtained 10 to 14 days prior to your next visit. It is important to fast for 12 hours prior to routine weight loss surgery blood work, EXCEPT for drinking water, to ensure accuracy of results. Please report nausea, vomiting, abdominal pain, or any other problems you experience to your surgeon. For problems related to weight loss surgery, it is best to go to [address] [place] [address] Emergency Department and have your surgeon paged.

## 2020-07-10 NOTE — PROGRESS NOTES
Camellia Dakins Randall-Covert  7/10/2020  Laparoscopic Maty-en- Y Gastric Bypass conversion from sleeve  Two weeks Post-Op Follow-up. Subjective:   Oscar Leonard is a 28 y.o. female is two weeks post Laparoscopic Maty-en-Y Gastric Bypass. The patient is not having any pain. Reports no problems with swallowing liquids, bowel movements, voiding, or the wounds. She is not having swallowing difficulty, is compliant most of the time with the multivitamins and calcium + Vit D. She is meeting fluid recommendations of at least 64 ounces per day and is meeting protein recommendations. Exercise: no regular exercise. 275 lb 8 oz (125 kg) Today's weight represents a weight loss of 10.5 pounds since surgery. Prior to Admission medications    Medication Sig Start Date End Date Taking?  Authorizing Provider   Ferrous Sulfate (IRON PO) Take 1 tablet by mouth daily   Yes Historical Provider, MD   Calcium Carbonate (CALCI-CHEW PO) Take 3 tablets by mouth daily   Yes Historical Provider, MD   docusate sodium (COLACE) 100 MG capsule Take 1 capsule by mouth daily as needed for Constipation 7/1/20  Yes Yloi Trotter MD   Elagolix Sodium (ORILISSA) 200 MG TABS Take 1 tablet by mouth 2 times daily 6/26/20  Yes Nilam Easton MD   ondansetron (ZOFRAN-ODT) 4 MG disintegrating tablet Take 1 tablet by mouth every 8 hours as needed for Nausea or Vomiting 6/19/20  Yes Yoli Trotter MD   diclofenac sodium (VOLTAREN) 1 % GEL Apply 2 g topically 4 times daily 6/5/20  Yes Jake Buitrago,    vitamin D (ERGOCALCIFEROL) 1.25 MG (16525 UT) CAPS capsule Take 1 capsule by mouth Twice a Week 4/13/20  Yes Yoli Trotter MD   albuterol sulfate HFA (VENTOLIN HFA) 108 (90 Base) MCG/ACT inhaler Inhale 2 puffs into the lungs 4 times daily as needed for Wheezing 3/26/20  Yes Kiki Phillips,    SUMAtriptan (IMITREX) 50 MG tablet TAKE 1/2 TABLET BY MOUTH ONCE FOR ONE DOSE AS NEEDED FOR MIGRAINE 2/5/20  Yes Mario Singh Isidor Po, DO   propranolol (INDERAL) 10 MG tablet Take one 3 times daily, may increase to 2 twice daily in 2 wks. , migraine  Patient taking differently: 20 mg 2 times daily Take one 3 times daily, may increase to 2 twice daily in 2 wks. , migraine 1/24/20  Yes Naresh De Los Santos MD   Multiple Vitamins-Minerals (MULTIVITAMIN ADULT PO) Take 1 tablet by mouth daily Indications: Bariatric Advantage    Yes Historical Provider, MD   omeprazole (PRILOSEC) 20 MG delayed release capsule Take 20 mg by mouth 2 times daily  2/15/17  Yes Historical Provider, MD        Physical exam:  VITALS: /67 (Site: Right Upper Arm, Position: Sitting, Cuff Size: Large Adult)   Pulse 69   Temp 98.2 °F (36.8 °C) (Temporal)   Resp 20   Ht 5' 7\" (1.702 m)   Wt 275 lb 8 oz (125 kg)   BMI 43.15 kg/m²    General appearance: alert, appears stated age and cooperative  Head: Normocephalic, without obvious abnormality, atraumatic  Neck: no adenopathy, no carotid bruit, no JVD, supple, symmetrical, trachea midline and thyroid not enlarged, symmetric, no tenderness/mass/nodules  Lungs: clear to auscultation bilaterally  Heart: regular rate and rhythm  Abdomen:  Incisions healing well, surgical glue in place, no allergic reaction, no cellulitis  Extremities: extremities normal, atraumatic, no cyanosis or edema    Assessment:    She is two weeks post laparoscopic conversion sleeve to gastric bypass. Plan:   Doing well. Walk as much as possible but keep your legs elevated when sitting. Continue deep breathing exercizes. Transition to the high protein Pureed-liquid diet. Continue drinking 1 oz every 10-15 minutes to prevent dehydration. Slowly increase this amount as tolerated. Aim for 60 gm Protein and 90 oz of liquids per day. Slow down if you feel chest pressure, acid or fullness. Track protein and fluid intake and bring to your next appointment. Follow up in 4 weeks and call the clinic if questions arise in the meantime.  Repeat labs in one month. Make sure the bowel move daily by taking fiber such as Metamucil.       Physician Signature: Electronically signed by Dr. Gilbert De Leon MD

## 2020-07-10 NOTE — PROGRESS NOTES
Pt is here for her 2 week LRYGB f/u, labs are in Epic and is scheduled with [facility] for dietary. Water intake is 64 or above daily, having bowel movements daily, vitamin intake is good. Getting protein through shakes. Incisions are healing well. Opioid Questions for BSTOP:  1. Enter the total number of opiods used after discharge  6  2. Did patient return unused opiods to a verified prescription disposal location?  No

## 2020-07-20 ENCOUNTER — HOSPITAL ENCOUNTER (OUTPATIENT)
Dept: MRI IMAGING | Age: 35
Discharge: HOME | End: 2020-07-22
Payer: COMMERCIAL

## 2020-07-20 ENCOUNTER — OFFICE VISIT (OUTPATIENT)
Dept: CHIROPRACTIC MEDICINE | Age: 35
End: 2020-07-20
Payer: COMMERCIAL

## 2020-07-20 VITALS
OXYGEN SATURATION: 97 % | DIASTOLIC BLOOD PRESSURE: 76 MMHG | BODY MASS INDEX: 43.16 KG/M2 | HEIGHT: 67 IN | TEMPERATURE: 98 F | HEART RATE: 78 BPM | WEIGHT: 275 LBS | SYSTOLIC BLOOD PRESSURE: 118 MMHG

## 2020-07-20 PROCEDURE — 73720 MRI LWR EXTREMITY W/O&W/DYE: CPT

## 2020-07-20 PROCEDURE — A9579 GAD-BASE MR CONTRAST NOS,1ML: HCPCS | Performed by: RADIOLOGY

## 2020-07-20 PROCEDURE — 99203 OFFICE O/P NEW LOW 30 MIN: CPT | Performed by: CHIROPRACTOR

## 2020-07-20 PROCEDURE — 98940 CHIROPRACT MANJ 1-2 REGIONS: CPT | Performed by: CHIROPRACTOR

## 2020-07-20 PROCEDURE — 6360000004 HC RX CONTRAST MEDICATION: Performed by: RADIOLOGY

## 2020-07-20 PROCEDURE — 73723 MRI JOINT LWR EXTR W/O&W/DYE: CPT

## 2020-07-20 PROCEDURE — 97014 ELECTRIC STIMULATION THERAPY: CPT | Performed by: CHIROPRACTOR

## 2020-07-20 RX ADMIN — GADOTERIDOL 20 ML: 279.3 INJECTION, SOLUTION INTRAVENOUS at 10:36

## 2020-07-20 ASSESSMENT — ENCOUNTER SYMPTOMS
BACK PAIN: 1
BOWEL INCONTINENCE: 0

## 2020-07-20 NOTE — PROGRESS NOTES
MHYX N LIMA AXEL    20  Orlando CLARK Randall-Covert : 1985 Sex: female  Age: 28 y.o. Patient was referred by Shaan Clifton DO    Chief Complaint   Patient presents with    Back Pain     goes into both legs but more pain in left    Hip Pain     discomfort       Had gastric bypass , inactive for a while afterward and back pain flared up. Describes as tightness, worse with inactivity. LBP for years - no injury. Treatments in the past with Ibuprofen and chiropractic care. Leg pain is new. B/L, L>R to post thighs, not past knee. Back Pain   This is a chronic problem. The problem occurs constantly. The problem has been waxing and waning since onset. The pain is present in the gluteal, lumbar spine and sacro-iliac. The quality of the pain is described as aching. The pain radiates to the right thigh and left thigh. The pain is at a severity of 4/10 (2-7/10 range). The symptoms are aggravated by sitting and bending. Stiffness is present in the morning. Associated symptoms include leg pain, tingling and weakness. Pertinent negatives include no bladder incontinence, bowel incontinence, fever, numbness or paresthesias. Risk factors include obesity. She has tried heat and analgesics for the symptoms. The treatment provided no relief. Hip Pain    Associated symptoms include tingling. Pertinent negatives include no numbness. Outcomes assessments completed today. Revised Oswestry Low Back Disability Index: 1950 = 38%    Red Flags:  None    Review of Systems   Constitutional: Negative for fever. Cardiovascular: Negative for leg swelling. Gastrointestinal: Negative for bowel incontinence. Genitourinary: Negative for bladder incontinence. Musculoskeletal: Positive for back pain. Neurological: Positive for tingling and weakness. Negative for numbness and paresthesias.          Current Outpatient Medications:     Ferrous Sulfate (IRON PO), Take 1 tablet by mouth daily, Disp: , Rfl:     Calcium Carbonate (CALCI-CHEW PO), Take 3 tablets by mouth daily, Disp: , Rfl:     docusate sodium (COLACE) 100 MG capsule, Take 1 capsule by mouth daily as needed for Constipation, Disp: 30 capsule, Rfl: 0    Elagolix Sodium (ORILISSA) 200 MG TABS, Take 1 tablet by mouth 2 times daily, Disp: 60 tablet, Rfl: 3    ondansetron (ZOFRAN-ODT) 4 MG disintegrating tablet, Take 1 tablet by mouth every 8 hours as needed for Nausea or Vomiting, Disp: 10 tablet, Rfl: 0    diclofenac sodium (VOLTAREN) 1 % GEL, Apply 2 g topically 4 times daily, Disp: 2 Tube, Rfl: 1    vitamin D (ERGOCALCIFEROL) 1.25 MG (68091 UT) CAPS capsule, Take 1 capsule by mouth Twice a Week, Disp: 24 capsule, Rfl: 1    albuterol sulfate HFA (VENTOLIN HFA) 108 (90 Base) MCG/ACT inhaler, Inhale 2 puffs into the lungs 4 times daily as needed for Wheezing, Disp: 1 Inhaler, Rfl: 0    SUMAtriptan (IMITREX) 50 MG tablet, TAKE 1/2 TABLET BY MOUTH ONCE FOR ONE DOSE AS NEEDED FOR MIGRAINE, Disp: 9 tablet, Rfl: 0    propranolol (INDERAL) 10 MG tablet, Take one 3 times daily, may increase to 2 twice daily in 2 wks. , migraine (Patient taking differently: 20 mg 2 times daily Take one 3 times daily, may increase to 2 twice daily in 2 wks. , migraine), Disp: 186 tablet, Rfl: 5    Multiple Vitamins-Minerals (MULTIVITAMIN ADULT PO), Take 1 tablet by mouth daily Indications: Bariatric Advantage , Disp: , Rfl:     omeprazole (PRILOSEC) 20 MG delayed release capsule, Take 20 mg by mouth 2 times daily , Disp: , Rfl:     Allergies   Allergen Reactions    Ciprofloxacin Anaphylaxis       Past Medical History:   Diagnosis Date    Allergic rhinitis     Anesthesia complication     pulse ox dropped    Arthritis     Chronic sinusitis     Chronic tension-type headache, intractable 1/24/2020    Constipation     Depression with anxiety     Earache     Folic acid deficiency 5/03/6034    Migraine headache 1/24/2020    Migraines     Morbidly obese ([facility] [address]     PCOS (polycystic ovarian syndrome) 2020    Polycystic ovarian syndrome     Prolonged emergence from general anesthesia     RLS (restless legs syndrome)     Swelling of both lower extremities     Vitamin D deficiency 2020     Family History   Problem Relation Age of Onset    High Blood Pressure Mother     High Cholesterol Mother     Arthritis Father     No Known Problems Sister     Cancer Maternal Grandmother     COPD Maternal Grandmother     Diabetes Maternal Grandmother     Elevated Lipids Maternal Grandmother     Hypertension Maternal Grandmother     Heart Disease Maternal Grandfather     Cancer Maternal Grandfather     Diabetes Maternal Grandfather      Past Surgical History:   Procedure Laterality Date    APPENDECTOMY  2003     SECTION  2006    CHOLECYSTECTOMY  2009    COLONOSCOPY      DILATION AND CURETTAGE OF UTERUS  2013    HYSTEROSCOPY  2015    [facility]   LAPAROSCOPY    SAJI-EN-Y GASTRIC BYPASS N/A 2020    GASTRIC SLEEVE CONVERT TO  SAJI-EN-Y LAPAROSCOPIC performed by Manjit Jay MD at [address] [address]    [facility]    TUBAL LIGATION  2014    Abalation    UPPER GASTROINTESTINAL ENDOSCOPY N/A 2020    EGD BIOPSY performed by Manjit Jay MD at [address] [place] [address] Drive History     Socioeconomic History    Marital status:      Spouse name: Not on file    Number of children: Not on file    Years of education: Not on file    Highest education level: Not on file   Occupational History    Not on file   Social Needs    Financial resource strain: Not on file    Food insecurity     Worry: Not on file     Inability: Not on file    Transportation needs     Medical: Not on file     Non-medical: Not on file   Tobacco Use    Smoking status: Former Smoker     Packs/day: 0.50     Years: 22.00     Pack years: 11.00     Start date:      Last attempt to quit: 2015     Years since quittin.5    Smokeless tobacco: Never Used  Tobacco comment: quit smoking 01/09/2015   Substance and Sexual Activity    Alcohol use: Yes     Alcohol/week: 1.0 standard drinks     Types: 1 Glasses of wine per week     Comment: monthly    Drug use: No    Sexual activity: Yes   Lifestyle    Physical activity     Days per week: Not on file     Minutes per session: Not on file    Stress: Not on file   Relationships    Social connections     Talks on phone: Not on file     Gets together: Not on file     Attends Restorationist service: Not on file     Active member of club or organization: Not on file     Attends meetings of clubs or organizations: Not on file     Relationship status: Not on file    Intimate partner violence     Fear of current or ex partner: Not on file     Emotionally abused: Not on file     Physically abused: Not on file     Forced sexual activity: Not on file   Other Topics Concern    Not on file   Social History Narrative    Not on file       Vitals:    07/20/20 1330   BP: 118/76   Site: Left Upper Arm   Position: Sitting   Pulse: 78   Temp: 98 °F (36.7 °C)   TempSrc: Temporal   SpO2: 97%   Weight: 275 lb (124.7 kg)   Height: 5' 7\" (1.702 m)       EXAM:  She appears well. No apparent distress. Alert and oriented x3. Ambulating without difficulty. Mild increase in lumbar lordosis is noted. Iliac crest heights are level. Lumbar flexion is 60 degrees with fingertips to toes. Extension 20 degrees with endrange low back pain. Lateral flexion to the left is 75% of normal with increased low back pain on the left. Lateral flexion to the right is 75% of normal with mild right low back pain. Reflexes are +2 and symmetrical at the Patella and Achilles. Manual muscle testing reveal 5/5 strength throughout the LE indicator muscles B/L. Sensation to light touch is WNL to the lower extremity dermatomes. Plantar response reveals down-going toes. Posterior Tibial and Dorsal Pedis pulses 2/4. SLR testing is negative bilaterally. Kemps testing negative bilaterally. Slump testing negative bilaterally. SI shear and SI compression tests are mildly provocative. She is tender in the midline L3-S1. Tender over the bilateral SI joints. Hypertonic tender fibers lumbar paraspinals bilaterally. Dwaine testing is negative bilaterally. Kevin reproduces some mild low back pain bilateral.      Yasmine Orantes was seen today for back pain and hip pain. Diagnoses and all orders for this visit:    Chronic bilateral low back pain with bilateral sciatica        Treatment Plan:   I spoke with her regarding my exam findings and treatment options. I recommended that we start a trial of conservative care today consisting CMT, EMS and home-based active self-care. .  I will plan on seeing her 1-2 times per week for 3 weeks, then reassess to determine response to care and future options. With consent, I did begin today. Problem/Goals  Decrease pain and/or swelling and Increase ROM and/or flexibility    Today's Treatment  EMS with heat to the lumbar region for 15 minutes to address muscle spasm/hypertension and alleviate pain. Love flexion distraction manipulation to the L 3 segment using protocol 1 was performed today. Mechanically assisted manipulation was performed to the segment listed in the SI joints. I spoke to her regarding posttreatment soreness. Should any arise, she  may use ice for 10-15 minutes, over-the-counter NSAIDs or topical gels. Seen By:  Baldomero Nina DC    * This note was created using voice recognition software.   The note was reviewed, however grammatical errors may exist.

## 2020-07-23 ENCOUNTER — OFFICE VISIT (OUTPATIENT)
Dept: CHIROPRACTIC MEDICINE | Age: 35
End: 2020-07-23
Payer: COMMERCIAL

## 2020-07-23 VITALS — OXYGEN SATURATION: 96 % | HEART RATE: 75 BPM | TEMPERATURE: 97.1 F

## 2020-07-23 PROCEDURE — 98940 CHIROPRACT MANJ 1-2 REGIONS: CPT | Performed by: CHIROPRACTOR

## 2020-07-23 PROCEDURE — 97014 ELECTRIC STIMULATION THERAPY: CPT | Performed by: CHIROPRACTOR

## 2020-07-23 NOTE — PROGRESS NOTES
20  Joaquin Duran Randall-Covert : 1985 Sex: female  Age: 28 y.o. Chief Complaint   Patient presents with    Lower Back Pain       HPI:   Continues with low back pain and left-sided sciatica. No new issues today. States it is generally unchanged from the other day, possibly a little better. She did have some soreness following her initial visit. Current Outpatient Medications:     Ferrous Sulfate (IRON PO), Take 1 tablet by mouth daily, Disp: , Rfl:     Calcium Carbonate (CALCI-CHEW PO), Take 3 tablets by mouth daily, Disp: , Rfl:     docusate sodium (COLACE) 100 MG capsule, Take 1 capsule by mouth daily as needed for Constipation, Disp: 30 capsule, Rfl: 0    Elagolix Sodium (ORILISSA) 200 MG TABS, Take 1 tablet by mouth 2 times daily, Disp: 60 tablet, Rfl: 3    ondansetron (ZOFRAN-ODT) 4 MG disintegrating tablet, Take 1 tablet by mouth every 8 hours as needed for Nausea or Vomiting, Disp: 10 tablet, Rfl: 0    diclofenac sodium (VOLTAREN) 1 % GEL, Apply 2 g topically 4 times daily, Disp: 2 Tube, Rfl: 1    vitamin D (ERGOCALCIFEROL) 1.25 MG (69252 UT) CAPS capsule, Take 1 capsule by mouth Twice a Week, Disp: 24 capsule, Rfl: 1    albuterol sulfate HFA (VENTOLIN HFA) 108 (90 Base) MCG/ACT inhaler, Inhale 2 puffs into the lungs 4 times daily as needed for Wheezing, Disp: 1 Inhaler, Rfl: 0    SUMAtriptan (IMITREX) 50 MG tablet, TAKE 1/2 TABLET BY MOUTH ONCE FOR ONE DOSE AS NEEDED FOR MIGRAINE, Disp: 9 tablet, Rfl: 0    propranolol (INDERAL) 10 MG tablet, Take one 3 times daily, may increase to 2 twice daily in 2 wks. , migraine (Patient taking differently: 20 mg 2 times daily Take one 3 times daily, may increase to 2 twice daily in 2 wks. , migraine), Disp: 186 tablet, Rfl: 5    Multiple Vitamins-Minerals (MULTIVITAMIN ADULT PO), Take 1 tablet by mouth daily Indications: Bariatric Advantage , Disp: , Rfl:     omeprazole (PRILOSEC) 20 MG delayed release capsule, Take 20 mg by mouth 2 times daily , Disp: , Rfl:     Exam: Continues with mild midline pain at L3-4 interspace. Tender left SI joint. Nontender right. SI shear and SI compression testing is positive left, negative right. There are hypertonic and tender fibers noted today in the bilateral lumbar paraspinal muscles. Joint fixation is noted with motion screening at left SI joint, L3-4. Audrey Azul was seen today for lower back pain. Diagnoses and all orders for this visit:    Chronic bilateral low back pain with bilateral sciatica        Treatment Plan:  EMS with heat to the lumbar region for 15 minutes to address muscle spasm/hypertension and alleviate pain. Love flexion distraction manipulation to the L 3 segment using protocol 1 was performed today. Mechanically assisted manipulation was performed to the left SI joint. Added Vilma Garcia SI mobilizations of the left SI joint and instructed her to perform these at home daily, 3 sets of 5 bilateral.  Tolerated well. Follow-up next week for care.   She may use ice to the left SI should she need it 10-15 minutes        Seen By:  Ronny Boast

## 2020-07-29 ENCOUNTER — OFFICE VISIT (OUTPATIENT)
Dept: CHIROPRACTIC MEDICINE | Age: 35
End: 2020-07-29
Payer: COMMERCIAL

## 2020-07-29 VITALS — TEMPERATURE: 97.6 F | HEART RATE: 83 BPM | OXYGEN SATURATION: 98 %

## 2020-07-29 PROCEDURE — 98940 CHIROPRACT MANJ 1-2 REGIONS: CPT | Performed by: CHIROPRACTOR

## 2020-07-29 PROCEDURE — 97014 ELECTRIC STIMULATION THERAPY: CPT | Performed by: CHIROPRACTOR

## 2020-07-29 NOTE — PROGRESS NOTES
20  Larry Sprain Randall-Covert : 1985 Sex: female  Age: 28 y.o. Chief Complaint   Patient presents with    Lower Back Pain       HPI:   She states that things improved following her last visit. However last night her back got sore again. She has been doing her exercises and feels they help. Continues with low back pain today. No new issues. Current Outpatient Medications:     Ferrous Sulfate (IRON PO), Take 1 tablet by mouth daily, Disp: , Rfl:     Calcium Carbonate (CALCI-CHEW PO), Take 3 tablets by mouth daily, Disp: , Rfl:     docusate sodium (COLACE) 100 MG capsule, Take 1 capsule by mouth daily as needed for Constipation, Disp: 30 capsule, Rfl: 0    Elagolix Sodium (ORILISSA) 200 MG TABS, Take 1 tablet by mouth 2 times daily, Disp: 60 tablet, Rfl: 3    ondansetron (ZOFRAN-ODT) 4 MG disintegrating tablet, Take 1 tablet by mouth every 8 hours as needed for Nausea or Vomiting, Disp: 10 tablet, Rfl: 0    diclofenac sodium (VOLTAREN) 1 % GEL, Apply 2 g topically 4 times daily, Disp: 2 Tube, Rfl: 1    vitamin D (ERGOCALCIFEROL) 1.25 MG (10096 UT) CAPS capsule, Take 1 capsule by mouth Twice a Week, Disp: 24 capsule, Rfl: 1    albuterol sulfate HFA (VENTOLIN HFA) 108 (90 Base) MCG/ACT inhaler, Inhale 2 puffs into the lungs 4 times daily as needed for Wheezing, Disp: 1 Inhaler, Rfl: 0    SUMAtriptan (IMITREX) 50 MG tablet, TAKE 1/2 TABLET BY MOUTH ONCE FOR ONE DOSE AS NEEDED FOR MIGRAINE, Disp: 9 tablet, Rfl: 0    propranolol (INDERAL) 10 MG tablet, Take one 3 times daily, may increase to 2 twice daily in 2 wks. , migraine (Patient taking differently: 20 mg 2 times daily Take one 3 times daily, may increase to 2 twice daily in 2 wks. , migraine), Disp: 186 tablet, Rfl: 5    Multiple Vitamins-Minerals (MULTIVITAMIN ADULT PO), Take 1 tablet by mouth daily Indications: Bariatric Advantage , Disp: , Rfl:     omeprazole (PRILOSEC) 20 MG delayed release capsule, Take 20 mg by mouth 2 times

## 2020-08-04 ENCOUNTER — OFFICE VISIT (OUTPATIENT)
Dept: PODIATRY | Age: 35
End: 2020-08-04
Payer: COMMERCIAL

## 2020-08-04 PROCEDURE — 20605 DRAIN/INJ JOINT/BURSA W/O US: CPT | Performed by: PODIATRIST

## 2020-08-04 PROCEDURE — 99213 OFFICE O/P EST LOW 20 MIN: CPT | Performed by: PODIATRIST

## 2020-08-04 RX ORDER — LIDOCAINE HYDROCHLORIDE 10 MG/ML
1 INJECTION, SOLUTION INFILTRATION; PERINEURAL ONCE
Status: COMPLETED | OUTPATIENT
Start: 2020-08-04 | End: 2020-08-04

## 2020-08-04 RX ORDER — DEXAMETHASONE SODIUM PHOSPHATE 4 MG/ML
4 INJECTION, SOLUTION INTRA-ARTICULAR; INTRALESIONAL; INTRAMUSCULAR; INTRAVENOUS; SOFT TISSUE ONCE
Status: COMPLETED | OUTPATIENT
Start: 2020-08-04 | End: 2020-08-04

## 2020-08-04 RX ORDER — METHYLPREDNISOLONE ACETATE 40 MG/ML
40 INJECTION, SUSPENSION INTRA-ARTICULAR; INTRALESIONAL; INTRAMUSCULAR; SOFT TISSUE ONCE
Status: COMPLETED | OUTPATIENT
Start: 2020-08-04 | End: 2020-08-04

## 2020-08-04 RX ADMIN — DEXAMETHASONE SODIUM PHOSPHATE 4 MG: 4 INJECTION, SOLUTION INTRA-ARTICULAR; INTRALESIONAL; INTRAMUSCULAR; INTRAVENOUS; SOFT TISSUE at 11:43

## 2020-08-04 RX ADMIN — LIDOCAINE HYDROCHLORIDE 1 ML: 10 INJECTION, SOLUTION INFILTRATION; PERINEURAL at 11:40

## 2020-08-04 RX ADMIN — METHYLPREDNISOLONE ACETATE 40 MG: 40 INJECTION, SUSPENSION INTRA-ARTICULAR; INTRALESIONAL; INTRAMUSCULAR; SOFT TISSUE at 11:42

## 2020-08-04 NOTE — PROGRESS NOTES
Patient in office to discuss MRI results     Inderjit Shoulders : 1985 Sex: female  Age: 28 y.o. Patient was referred by Elo Garcia DO    CC:    Follow-up right ankle pain  Follow-up MRI right foot and ankle    HPI:   Follow-up right ankle pain. Follow-up right foot and ankle MRI. Did have recent gastric bypass surgery 2020. Continues having tenderness both right ankle joint as well as the inside arch of the right foot. Denies recent trauma or injury. Does have ankle brace but states the over-the-counter ankle brace did not seem to help much. Denies current use of anti-inflammatories. Denies any calf pain today. States she still has instability and tenderness when she is walking at the end of the day. Denies current use of custom orthotics. No additional pedal complaints.     ROS:  Const: Denies constitutional symptoms  Musculo: Denies symptoms other than stated above  Skin: Denies symptoms other than stated above       Current Outpatient Medications:     Ferrous Sulfate (IRON PO), Take 1 tablet by mouth daily, Disp: , Rfl:     Calcium Carbonate (CALCI-CHEW PO), Take 3 tablets by mouth daily, Disp: , Rfl:     docusate sodium (COLACE) 100 MG capsule, Take 1 capsule by mouth daily as needed for Constipation, Disp: 30 capsule, Rfl: 0    Elagolix Sodium (ORILISSA) 200 MG TABS, Take 1 tablet by mouth 2 times daily, Disp: 60 tablet, Rfl: 3    ondansetron (ZOFRAN-ODT) 4 MG disintegrating tablet, Take 1 tablet by mouth every 8 hours as needed for Nausea or Vomiting, Disp: 10 tablet, Rfl: 0    diclofenac sodium (VOLTAREN) 1 % GEL, Apply 2 g topically 4 times daily, Disp: 2 Tube, Rfl: 1    vitamin D (ERGOCALCIFEROL) 1.25 MG (05320 UT) CAPS capsule, Take 1 capsule by mouth Twice a Week, Disp: 24 capsule, Rfl: 1    albuterol sulfate HFA (VENTOLIN HFA) 108 (90 Base) MCG/ACT inhaler, Inhale 2 puffs into the lungs 4 times daily as needed for Wheezing, Disp: 1 Inhaler, Rfl: 0   plantarflexion, inversion, eversion bilateral 5 out of 5 muscle strength  Wiggling toes  Negative Homans  Pes planovalgus worse on the right  Tenderness to palpation right ankle with dorsiflexion plantarflexion. Tenderness medial gutter right ankle with dorsiflexion plantarflexion. Negative Moore  Tenderness insertion posterior tibial tendon right. Pes planovalgus bilateral worse on the right. Dermatology examination:    No open skin lesions or abrasions bilateral lower extremity. Assessment and Plan:  Delroy Farr was seen today for follow-up. Diagnoses and all orders for this visit:    Chronic pain of right ankle  -     dexamethasone (DECADRON) injection 4 mg  -     lidocaine 1 % injection 1 mL  -     methylPREDNISolone acetate (DEPO-MEDROL) injection 40 mg    Other synovitis and tenosynovitis, right ankle and foot    Posterior tibial tendinitis, right    Rupture of right posterior tibialis tendon, subsequent encounter        Follow-up right foot and right ankle pain  Here today MRI results right foot and right ankle  I did discuss MRI findings. Does have complete rupture posterior tibial tendon on the right. She did have recent gastric bypass surgery. I did recommend continued conservative treatment at this time especially in the setting of postoperative gastric bypass surgery. I did write a prescription for  laboratory for custom right ankle brace. I did discuss Arthur brace of right ankle for posterior tibial tendon. Potential risks, complications, alternative treatments, and procedure prognosis were explained to the patient. Verbal informed consent was obtained from the patient.    Betadine and alcohol preparation was performed   Ethyl chloride used over injection site  I next used a sterile 3 mL syringe with 25-gauge needle with 1 mL 1% lidocaine plain, 1 mL 1% dexamethasone and 1 mL methylprednisone injected right ankle joint standard anterior medial approach medial to neurovascular structures and tendon. Patient tolerated corticosteroid injection well. Band-Aid applied. The patient was educated on a possible steroid flare and the use of ice with frozen water bottle 10 minutes tonight. I will follow-up 3 months. Return in about 3 months (around 11/4/2020). Seen By:  Flora Lemon DPM      Document was created using voice recognition software. Note was reviewed, however may contain grammatical errors.

## 2020-08-05 ENCOUNTER — OFFICE VISIT (OUTPATIENT)
Dept: CHIROPRACTIC MEDICINE | Age: 35
End: 2020-08-05
Payer: COMMERCIAL

## 2020-08-05 VITALS — OXYGEN SATURATION: 97 % | TEMPERATURE: 97.8 F | HEART RATE: 72 BPM

## 2020-08-05 PROCEDURE — 97014 ELECTRIC STIMULATION THERAPY: CPT | Performed by: CHIROPRACTOR

## 2020-08-05 PROCEDURE — 98940 CHIROPRACT MANJ 1-2 REGIONS: CPT | Performed by: CHIROPRACTOR

## 2020-08-11 ENCOUNTER — HOSPITAL ENCOUNTER (OUTPATIENT)
Age: 35
Discharge: HOME | End: 2020-08-13
Payer: COMMERCIAL

## 2020-08-11 LAB
ALBUMIN SERPL-MCNC: 4 G/DL (ref 3.5–5.2)
ALP BLD-CCNC: 70 U/L (ref 35–104)
ALT SERPL-CCNC: 29 U/L (ref 0–32)
ANION GAP SERPL CALCULATED.3IONS-SCNC: 19 MMOL/L (ref 7–16)
AST SERPL-CCNC: 22 U/L (ref 0–31)
BILIRUB SERPL-MCNC: 0.3 MG/DL (ref 0–1.2)
BUN BLDV-MCNC: 12 MG/DL (ref 6–20)
CALCIUM SERPL-MCNC: 9.4 MG/DL (ref 8.6–10.2)
CHLORIDE BLD-SCNC: 102 MMOL/L (ref 98–107)
CHOLESTEROL, TOTAL: 136 MG/DL (ref 0–199)
CO2: 21 MMOL/L (ref 22–29)
CREAT SERPL-MCNC: 0.8 MG/DL (ref 0.5–1)
FERRITIN: 89 NG/ML
FOLATE: 5.1 NG/ML (ref 4.8–24.2)
GFR AFRICAN AMERICAN: >60
GFR NON-AFRICAN AMERICAN: >60 ML/MIN/1.73
GLUCOSE BLD-MCNC: 89 MG/DL (ref 74–99)
HCT VFR BLD CALC: 45.2 % (ref 34–48)
HEMOGLOBIN: 14.5 G/DL (ref 11.5–15.5)
MCH RBC QN AUTO: 28.6 PG (ref 26–35)
MCHC RBC AUTO-ENTMCNC: 32.1 % (ref 32–34.5)
MCV RBC AUTO: 89.2 FL (ref 80–99.9)
PDW BLD-RTO: 13.5 FL (ref 11.5–15)
PLATELET # BLD: 236 E9/L (ref 130–450)
PMV BLD AUTO: 11 FL (ref 7–12)
POTASSIUM SERPL-SCNC: 3.6 MMOL/L (ref 3.5–5)
PREALBUMIN: 14 MG/DL (ref 20–40)
RBC # BLD: 5.07 E12/L (ref 3.5–5.5)
SODIUM BLD-SCNC: 142 MMOL/L (ref 132–146)
TOTAL PROTEIN: 7.4 G/DL (ref 6.4–8.3)
TRIGL SERPL-MCNC: 83 MG/DL (ref 0–149)
VITAMIN B-12: 530 PG/ML (ref 211–946)
VITAMIN D 25-HYDROXY: 29 NG/ML (ref 30–100)
WBC # BLD: 10 E9/L (ref 4.5–11.5)

## 2020-08-11 PROCEDURE — 84134 ASSAY OF PREALBUMIN: CPT

## 2020-08-11 PROCEDURE — 82465 ASSAY BLD/SERUM CHOLESTEROL: CPT

## 2020-08-11 PROCEDURE — 85027 COMPLETE CBC AUTOMATED: CPT

## 2020-08-11 PROCEDURE — 82607 VITAMIN B-12: CPT

## 2020-08-11 PROCEDURE — 82746 ASSAY OF FOLIC ACID SERUM: CPT

## 2020-08-11 PROCEDURE — 36415 COLL VENOUS BLD VENIPUNCTURE: CPT

## 2020-08-11 PROCEDURE — 82728 ASSAY OF FERRITIN: CPT

## 2020-08-11 PROCEDURE — 84630 ASSAY OF ZINC: CPT

## 2020-08-11 PROCEDURE — 84425 ASSAY OF VITAMIN B-1: CPT

## 2020-08-11 PROCEDURE — 82306 VITAMIN D 25 HYDROXY: CPT

## 2020-08-11 PROCEDURE — 80053 COMPREHEN METABOLIC PANEL: CPT

## 2020-08-11 PROCEDURE — 84478 ASSAY OF TRIGLYCERIDES: CPT

## 2020-08-12 ENCOUNTER — OFFICE VISIT (OUTPATIENT)
Dept: CHIROPRACTIC MEDICINE | Age: 35
End: 2020-08-12
Payer: COMMERCIAL

## 2020-08-12 VITALS — HEART RATE: 80 BPM | OXYGEN SATURATION: 97 % | TEMPERATURE: 97.2 F

## 2020-08-12 PROCEDURE — 98940 CHIROPRACT MANJ 1-2 REGIONS: CPT | Performed by: CHIROPRACTOR

## 2020-08-12 PROCEDURE — 97014 ELECTRIC STIMULATION THERAPY: CPT | Performed by: CHIROPRACTOR

## 2020-08-12 NOTE — PROGRESS NOTES
Pulse: 80   Temp: 97.2 °F (36.2 °C)   SpO2: 97%         There are hypertonic and tender fibers noted today in the bilateral lumbar paraspinal muscles. Joint fixation is noted with motion screening at L4-5, left SI joint. Orlando Sauer was seen today for lower back pain. Diagnoses and all orders for this visit:    Chronic bilateral low back pain with bilateral sciatica        Treatment Plan:  EMS with heat to the lumbar region for 15 minutes to address muscle spasm/hypertension and alleviate pain. Love flexion distraction manipulation at L4, protocol 1. Mechanically assisted manipulation of the SI joints. Long lever done Tigney SI mobilization to the SI joint as well. Tolerated well. She is heading back to work tomorrow, and does not know what her work schedule will be. She will contact us for follow-up.   In the meantime, continue with her HEP as instructed        Seen By:  Benita Youssef

## 2020-08-14 ENCOUNTER — INITIAL CONSULT (OUTPATIENT)
Dept: BARIATRICS/WEIGHT MGMT | Age: 35
End: 2020-08-14
Payer: COMMERCIAL

## 2020-08-14 ENCOUNTER — OFFICE VISIT (OUTPATIENT)
Dept: BARIATRICS/WEIGHT MGMT | Age: 35
End: 2020-08-14
Payer: COMMERCIAL

## 2020-08-14 VITALS
HEART RATE: 73 BPM | HEIGHT: 67 IN | WEIGHT: 260.5 LBS | SYSTOLIC BLOOD PRESSURE: 111 MMHG | BODY MASS INDEX: 40.89 KG/M2 | DIASTOLIC BLOOD PRESSURE: 67 MMHG | TEMPERATURE: 97.9 F | RESPIRATION RATE: 20 BRPM

## 2020-08-14 VITALS — HEIGHT: 67 IN | WEIGHT: 260.5 LBS | BODY MASS INDEX: 40.89 KG/M2

## 2020-08-14 PROCEDURE — 99999 PR OFFICE/OUTPT VISIT,PROCEDURE ONLY: CPT | Performed by: DIETITIAN, REGISTERED

## 2020-08-14 PROCEDURE — 99024 POSTOP FOLLOW-UP VISIT: CPT | Performed by: SURGERY

## 2020-08-14 PROCEDURE — 99212 OFFICE O/P EST SF 10 MIN: CPT

## 2020-08-14 NOTE — PATIENT INSTRUCTIONS
High Vitamin D Foods:  Written By: Gisele Hernandez RD/LD    What role does Vitamin D play in the body? Vitamin D is known as the sunshine vitamin. Vitamin D is actually a hormone that is produced in our bodies by ultraviolet B rays. These light rays trigger the production of vitamin D by our skin cells. The hormone that is produced is called calcitriol and once it is made it travels to the intestines to help with the absorption of dietary calcium, as well as fluoride. How does Vitamin D work? Vitamin D and Calcium work together to promote the growth of bones and development of healthy teeth. The amount of vitamin D you produce effects the amount of calcium that can be absorbed by the body. When calcium from foods you eat reaches the intestines, it waits for the presence of the vitamin D hormone, calcitriol, to be able to cross the intestinal membranes and to be transported to your bones. Without sufficient vitamin D, calcium levels in the bones and teeth will decrease leading to weak, brittle bones and increasing the possibility of osteoporosis. WHY? Calcium is primarily found in the blood and needs to remain balanced at a constant level. The calcium that is in the blood is there to be used for quick transport to muscles and nerves when required. When calcium is sent to required organs, the calcium levels in the blood become low. Calcium is then pulled out of the bones and goes into the blood to replace what was sent to the muscles or nerves. This is the normal cycle and is perfect if you have a continuous supply of calcium from the diet to replace calcium from bones and teeth to keep blood levels steady. If dietary calcium levels are depleted, the blood will keep pulling calcium away from the bones and teeth and the structures will be weak. Where can Vitamin D be found? Vitamin D can be self-synthesized with sunlight.     Vitamin D is also found in fortified milk, fortified margarine, egg yolks, liver and fatty fish. What occurs when you are deficient in Vitamin D? Bones and Teeth:  [place] can occur with Vitamin D deficiency, which is known as softening of the bones and teeth. Due to the softening effect we can see deformities of the limbs, spine, thorax and pelvis. Also seen is pain in the pelvis, lower back and legs, including increase risk to bone fractures. Blood:  Vitamin D deficiency can cause decreased calcium and/or phosphorus in the blood and increased alkaline phosphatase. Nervous/Muscular Systems:  Vitamin D deficiency can cause lax muscles resulting in protrusion of the abdomen ([place]) and muscle spasms. Some patients complain of involuntary twitching and  muscle spasms. Excretory System:  Vitamin D deficiency can cause increased calcium in the stool and decreased calcium in the urine. Other:  Vitamin D deficiency can cause abnormally high secretions of parathormone, which is why we run lab work at 1 year to make sure the calcium supplements you are taking are being absorbed correctly. Vitamin D in Commonly Eaten Foods Ranked Richest to [employer]:      Food Serving Size IU of   Vitamin D----------        Herring, fresh, raw,  1 oz. 255 IU   [place] 1 oz. 142 IU   Milk,Cows Fortified 1 Cup 100 IU   Sardines, canned 1 oz. 85 IU   Chicken-Liver, Cooked 3oz. 45 IU   Shrimp, Canned 1oz. 30 IU   Egg Yolk 1 25 IU   Calf-Liver, Cooked 3oz. 12 IU   Cream, Light 1 T 8 IU   Cheddar Cheese 1 oz. 3 IU   Oysters 4 3 IU   Butter  1 tsp 1.4 IU            Unfortunately after RYGB surgery you will not be able to increase your Vitamin D with diet alone. A Vitamin D supplement will be needed in order to improve Vitamin D lab values.

## 2020-08-14 NOTE — PATIENT INSTRUCTIONS
Please continue to take your vitamin and mineral supplements as instructed. If you received a blood work prescription today for laboratory monitoring due prior to your next routine follow-up visit, please have this blood work obtained 10 to 14 days prior to your next visit. It is important to fast for 12 hours prior to routine weight loss surgery blood work, EXCEPT for drinking water, to ensure accuracy of results. Please report nausea, vomiting, abdominal pain, or any other problems you experience to your surgeon. For problems related to weight loss surgery, it is best to go to [address] [place] [address] Emergency Department and have your surgeon paged.

## 2020-08-14 NOTE — PROGRESS NOTES
Medical Nutrition Therapy (MNT) Assessment     Pt. Name: Jd Toscano Randall-Covert   Date:8/14/2020  F/U Appt: Sx Type 6 week LSG to RYGB Conversion   Food Records Kept: No  24Hour Recall Completed at Office:No    Ht 5' 7\" (1.702 m)   Wt 260 lb 8 oz (118.2 kg)   BMI 40.80 kg/m²  Height: 5' 7\" (1.702 m) Weight: 260 lb 8 oz (118.2 kg)   IBW:ideal body weight   163 lbs % EBWL: 20%     Wt Readings from Last 3 Encounters:   08/14/20 260 lb 8 oz (118.2 kg)   08/14/20 260 lb 8 oz (118.2 kg)   07/20/20 275 lb (124.7 kg)                     Protein supplements: Pt. is currently using the following protein supplement Nectar and consuming the following grams of protein 80 grams. Rd / Ld reviewed with patient based on 1.0 gram per kg of IBW patient needs 74 - 84 grams of protein total daily.       Subjective:                   Current MNT:       Bariatric puree diet with MVI, Calcium & Protein Supplements     MNT Advanced to:     Bariatric soft diet with MVI, Calcium & Protein Supplements      Allergies and Food Allergies and Food Intolerances: Eggs after sx make the pt feel queasy    Nutritional Data  No - Poor appetite more than 5 days     No - NPO or clear liquid more than 3 days     No - Problem Chewing      No - Problem Swallowing      No - Problem Mouth Pain      No - Problem Denture  No - Missing Teeth         No - Pressure Sore    No - Open Wound    No - Surgical Wound  No - Documented: Sepsis or Infection    No - Nausea  No - Vomiting    [facility] Bowel Protocol:  Patient states he / she has the following bowel movements per week - Daily  No - Diarrhea  No - Steatorrhea  No - Constipation  When was your last bowel movement - Last night  How much plain water are you drinking daily   80 oz  What other beverages / fluids are you drinking daily and the amount  - Decaf Tea  Yes - Are you taking Colace daily  What amount of Colace are you taking daily  - 100 mgs twice daily  Yes - Are you taking Sugar Free Chewable Fiber Gummies  What amount of Sugar Free Chewable Fiber Gummies are you taking daily   Benefiber tablets 2 daily  Yes - Did your surgeon discuss constipation with you? Pt reports no bowel issues continue current tx per Dr. Cindy Mathews - Did your surgeon discuss increasing water intake? How much water were you instructed to take daily?  - Increase as much as tolerated pt states per Dr. Addi Krishna - Joey Langston reviewed 64 - 90 oz   Yes - Did your surgeon discuss continuing Colace? How much Colace did your surgeon instruct you to take?  - Dose approp above  No - Did your surgeon discuss stopping Colace? No - Did your surgeon discuss starting Fiber Gummies? How much Fiber Gummies did your surgeon instruct you to take? Dose approp above  Did your surgeon discuss any other medications / supplements to take daily to move your bowels and avoid constipation? No  Yes Patient was provided today the Constipation Handout  Yes Joey Langston reinforced pt needs to consume the following - Water Intake should be 64  90 oz, Fiber Chews 15 grams,     No - Hair loss   No - Weight regain       No - Acid Reflux  No - Dumping Syndrome      No - Food gets stuck  No - Are you eating solids - should not be eating solids until 6 weeks post-op. not applicable - Night Time Coughing  not applicable -  B Ping Noted  No - Are you chewing thoroughly  - Does not take effect until 6 weeks post-op  Yes - Are you hungry after eating - Couple times this has occurred     How many meals a day 5 to 6 / Portions Sizes of Meals are 3 oz         Labs: 8/11/20 - Vitamin D 29L , Prealbumin 14L - Per Dr. Addi Krishna he does not want pt started on a Vitamin D supplement. Dr. Addi Krishna instructed pt to incorporate spinach in her diet and stay on her bariatric supplements instead. Joey Langston gave pt handout on high Vit D foods.   See AVS    Supplements: Bariatric Advantage Multi-Vitamin 1 tablet 2 times Daily, Bariatric Advantage 29 mgs Iron 1 tablet Daily, Bariatric Advantage Calcium Lozenges 1 tablet 3 times Daily , Dry Vitamin D3 5,000iu every day Pt thinks she takes Vit B12 but is not sure ? ??    Estimated Daily Nutritional Needs: Based on Bariatric procedure for Wt. Loss  Energy: Will be calculated at Maintenance Stage    Protein: 60 - 80 gms Daily    MNT Plan and Additional Education: RD/LD reviewed Bariatric Soft Diet. Encouraged pt to meet protein and fluid needs daily. Pt. verbalized understanding. Handouts given. Pt. instructed to call with questions. Patient was instructed on the importance of increasing water intake to 48 - 64 oz. of water total daily. Pt. was also instructed he / she is allowed an additional 30 oz. of sugar free caffeine free clear liquid beverages for a total of 90 oz. of fluid total daily. Pt. was able to verbalize how he / she can get more water and fluids within the diet. Pt. verbalized understanding. A high fiber supplement with plenty of fluids (up to 8 glasses of water daily) is suggested to relieve these symptoms. Metamucil, 1 tablespoon once or twice daily can be used to keep bowels regular if needed. MEDICAL NUTRITION THERAPY (MNT) - Nutrition Care Plan   (The patient has been educated and given written education material that reinforces the following dietary guidelines for Bariatric Surgery)  Goal:  Patient able to verbalize the following dietary concepts:  3 to 4 ounce portions per meal     6 small meals daily      60 to 80 grams of protein   48 to 64 ounces of fluid daily (water)     Always consume protein item first with all meals   Pt is aware wt loss is pt controlled.    Slow Meals - 30-35 chews per bite / Meals 30 - 45 minutes long            The RD / LD reviewed with the patient that the dietary goals of the bariatric patient is to ensure that patient is able to meet established nutritional needs for a Bariatric Surgery  Patient at this time in order to promote healing, prevent significant weight changes, prevent skin breakdown and abnormal lab values, prevent complications, and tolerance to diet and texture of foods. Pt is able to identify proper food choices and needed changes and is able to explain proper food preparation. RD / LD reviewed compliance with assigned diet stages, volume of food and drink consumed, timing of meals, amount of protein and energy intake, daily vitamin and mineral supplementation, identify food cravings and any adverse reactions associated with intake of food and drink. RD / LD uses behavioral tools such as goal setting, MI, and self-monitoring, to reinforce the anatomic and physiologic effects of the surgery, so that the described behaviors become more of a habit not simply a reaction to the altered anatomy. Care Plan:   · Rd/Ld Addressed Food Records or 24-Hour Recall  · Rd / Ld encouraged patient to exercise at least 30 minutes daily  · Rd / Ld instructed patient on how to increase oral protein intake within the diet. Pt. can verbalize he / she will need to consume 60 - 80 grams. · Rd / Ld instructed the patient on how to increase the use of protein supplements within the diet. · Pt. was instructed on how to increase water intake. Patient will need to consume 48 - 64 oz. of just plain water in addition to 30 oz. of non-caloric beverages. · Handouts given to patient  · RD / LD encouraged oral intake    · RD / LD encouraged pt. to keep food records.       · Pt. is able to verbalize diet concepts      Yes - Constipation Handout Given and Reviewed    Yes - High Fiber Handout Given and Reviewed      Yes and No - Ulcer Handout Given and Reviewed          No - Pt. was instructed to continue current MNT   Yes - Pt. diet was advanced to the following stage ( See above)   No - Supplements: initiated (See list below  - Pt. given instruction)     No - Supplemental foods:______________________  No - Pt. was placed on a altered meal schedule    Good - Dietary Compliance

## 2020-08-14 NOTE — PROGRESS NOTES
Wood Slider Randall-Covert  8/14/2020  Laparoscopic Maty-en- Y Gastric Bypass   6 weeks Post-Operative Follow-up. Subjective:   Irma Ulrich is a 28 y.o. female  six weeks post Laparoscopic Maty-en-Y Gastric Bypass. She reports no pain. The patient is not having any pain. Taking the pureed diet well, no nausea, no pain, wounds all healed. Sheis not having constipation problems. She is not having swallowing difficulty, is compliant most of the time with the multivitamins and calcium + Vit D. She is meeting fluid recommendations of at least 64 ounces per day and is meeting protein recommendations. Exercise: walking 30 minutes/day- 3 days/week. Weight: 260 lb 8 oz (118.2 kg)  Today's weight represents a weight loss of 25 pounds. Prior to Admission medications    Medication Sig Start Date End Date Taking? Authorizing Provider   Ferrous Sulfate (IRON PO) Take 1 tablet by mouth daily   Yes Historical Provider, MD   Calcium Carbonate (CALCI-CHEW PO) Take 3 tablets by mouth daily   Yes Historical Provider, MD   docusate sodium (COLACE) 100 MG capsule Take 1 capsule by mouth daily as needed for Constipation 7/1/20  Yes Diane Reyes MD   Elagolix Sodium (ORILISSA) 200 MG TABS Take 1 tablet by mouth 2 times daily 6/26/20  Yes Kit Lane MD   diclofenac sodium (VOLTAREN) 1 % GEL Apply 2 g topically 4 times daily 6/5/20  Yes Lucian Milan DO   vitamin D (ERGOCALCIFEROL) 1.25 MG (63397 UT) CAPS capsule Take 1 capsule by mouth Twice a Week 4/13/20  Yes Diane Reyes MD   albuterol sulfate HFA (VENTOLIN HFA) 108 (90 Base) MCG/ACT inhaler Inhale 2 puffs into the lungs 4 times daily as needed for Wheezing 3/26/20  Yes Kiki Phillips DO   SUMAtriptan (IMITREX) 50 MG tablet TAKE 1/2 TABLET BY MOUTH ONCE FOR ONE DOSE AS NEEDED FOR MIGRAINE 2/5/20  Yes Lucian Milan DO   propranolol (INDERAL) 10 MG tablet Take one 3 times daily, may increase to 2 twice daily in 2 wks. , migraine  Patient taking differently: 20 mg 2 times daily Take one 3 times daily, may increase to 2 twice daily in 2 wks. , migraine 1/24/20  Yes eDann Casanova MD   Multiple Vitamins-Minerals (MULTIVITAMIN ADULT PO) Take 1 tablet by mouth daily Indications: Bariatric Advantage    Yes Historical Provider, MD   omeprazole (PRILOSEC) 20 MG delayed release capsule Take 20 mg by mouth 2 times daily  2/15/17  Yes Historical Provider, MD   ondansetron (ZOFRAN-ODT) 4 MG disintegrating tablet Take 1 tablet by mouth every 8 hours as needed for Nausea or Vomiting  Patient not taking: Reported on 8/14/2020 6/19/20   Mariann Ramos MD      Physical exam:  VITALS:   /67 (Site: Right Upper Arm, Position: Sitting, Cuff Size: Large Adult)   Pulse 73   Temp 97.9 °F (36.6 °C) (Temporal)   Resp 20   Ht 5' 7\" (1.702 m)   Wt 260 lb 8 oz (118.2 kg)   BMI 40.80 kg/m²    General appearance: AAO, NAD  Eyes: PERRL, EOMI, red conjunctiva  Lungs: CTAB, no wheeze, no rhonchi  Chest wall: atraumatic, no tenderness, no echymosis or abrasions  Heart: reg rate, no murmur  Abdomen: soft, nondistended, tender minimal, no hernias, no peritioneal signs, incisions well healed, no rash below pannus  Extremities: full ROM all 4 ext, no gross motor or sensory deficits  Pulses: 2+ distal  Skin: warm and dry  Neurologic: spontanous eye opening, purposeful, follows complex commands      Assessment: She is 6 weeks post Laparoscopic Maty-en- Y Gastric Bypass. Plan:  Transition to the soft high protein diet. Eat small portions very slowly and chew until the food is liquified before swallowing. Track protein and fluids and bring to the next appointment, maintain adequate variety and balance, pre-plan meals and eat slowly. Follow up in 6 weeks and contact me if questions arise in the meantime. Exercise 7 days a week at least 30 minutes. Repeat labs before the next appointment. Make sure the bowel move daily by taking fiber.        Physician Signature: Electronically signed by Dr. Diane Reyes MD

## 2020-08-16 LAB — VITAMIN B1 WHOLE BLOOD: 133 NMOL/L (ref 70–180)

## 2020-08-18 LAB — ZINC: 68.8 UG/DL (ref 60–120)

## 2020-09-16 ENCOUNTER — OFFICE VISIT (OUTPATIENT)
Dept: FAMILY MEDICINE CLINIC | Age: 35
End: 2020-09-16
Payer: COMMERCIAL

## 2020-09-16 VITALS
HEART RATE: 80 BPM | DIASTOLIC BLOOD PRESSURE: 62 MMHG | SYSTOLIC BLOOD PRESSURE: 105 MMHG | OXYGEN SATURATION: 95 % | TEMPERATURE: 97.9 F

## 2020-09-16 PROCEDURE — 99213 OFFICE O/P EST LOW 20 MIN: CPT | Performed by: PHYSICIAN ASSISTANT

## 2020-09-16 RX ORDER — ONDANSETRON 4 MG/1
4 TABLET, FILM COATED ORAL EVERY 4 HOURS PRN
Qty: 12 TABLET | Refills: 0 | Status: SHIPPED
Start: 2020-09-16 | End: 2020-10-02

## 2020-09-16 NOTE — PROGRESS NOTES
20  Stevenson Harden Randall-Covert : 1985 Sex: female  Age 28 y.o. Subjective:  Chief Complaint   Patient presents with    Nausea         HPI:   Stevenson Harden Randall-Covert , 28 y.o. female presents to [facility] care for evaluation of nausea. Patient states nausea  just started in the last few hours. She denies any vomiting or diarrhea. She denies any fever chills abdominal pain. She denies any urinary symptoms. Patient states it started after she drink her protein shake this morning. She has been able to drink water since she has been nauseated but has not tried to eat any other solid foods. Patient states yesterday while she was at work she was exposed to someone who \"had the stomach flu\". Patient denies any chest pain shortness of breath. Patient denies other symptoms and presents for evaluation      ROS:   Unless otherwise stated in this report the patient's positive and negative responses for review of systems for constitutional, eyes, ENT, cardiovascular, respiratory, gastrointestinal, neurological, , musculoskeletal, and integument systems and related systems to the presenting problem are either stated in the history of present illness or were not pertinent or were negative for the symptoms and/or complaints related to the presenting medical problem. Positives and pertinent negatives as per HPI. All others reviewed and are negative.       PMH:     Past Medical History:   Diagnosis Date    Allergic rhinitis     Anesthesia complication     pulse ox dropped    Arthritis     Chronic sinusitis     Chronic tension-type headache, intractable 2020    Constipation     Depression with anxiety     Earache     Folic acid deficiency 4507    Migraine headache 2020    Migraines     Morbidly obese (HCC)     PCOS (polycystic ovarian syndrome) 2020    Polycystic ovarian syndrome     Prolonged emergence from general anesthesia     RLS (restless legs syndrome)     Swelling of both lower extremities     Vitamin D deficiency 2020       Past Surgical History:   Procedure Laterality Date    APPENDECTOMY  2003     SECTION  2006    CHOLECYSTECTOMY      COLONOSCOPY      DILATION AND CURETTAGE OF UTERUS      HYSTEROSCOPY  2015    [facility]   LAPAROSCOPY    SAJI-EN-Y GASTRIC BYPASS N/A 2020    GASTRIC SLEEVE CONVERT TO  SAJI-EN-Y LAPAROSCOPIC [facility]  [facility]    TUBAL LIGATION  2014    Abalation    UPPER GASTROINTESTINAL ENDOSCOPY N/A 2020    [facility]     Medications:     Current Outpatient Medications:     ondansetron (ZOFRAN) 4 MG tablet, Take 1 tablet by mouth every 4 hours as needed for Nausea or Vomiting, Disp: 12 tablet, Rfl: 0    Ferrous Sulfate (IRON PO), Take 1 tablet by mouth daily, Disp: , Rfl:     Calcium Carbonate (CALCI-CHEW PO), Take 3 tablets by mouth daily, Disp: , Rfl:     docusate sodium (COLACE) 100 MG capsule, Take 1 capsule by mouth daily as needed for Constipation, Disp: 30 capsule, Rfl: 0    Elagolix Sodium (ORILISSA) 200 MG TABS, Take 1 tablet by mouth 2 times daily, Disp: 60 tablet, Rfl: 3    ondansetron (ZOFRAN-ODT) 4 MG disintegrating tablet, Take 1 tablet by mouth every 8 hours as needed for Nausea or Vomiting, Disp: 10 tablet, Rfl: 0    diclofenac sodium (VOLTAREN) 1 % GEL, Apply 2 g topically 4 times daily, Disp: 2 Tube, Rfl: 1    vitamin D (ERGOCALCIFEROL) 1.25 MG (90779 UT) CAPS capsule, Take 1 capsule by mouth Twice a Week, Disp: 24 capsule, Rfl: 1    albuterol sulfate HFA (VENTOLIN HFA) 108 (90 Base) MCG/ACT inhaler, Inhale 2 puffs into the lungs 4 times daily as needed for Wheezing, Disp: 1 Inhaler, Rfl: 0    SUMAtriptan (IMITREX) 50 MG tablet, TAKE 1/2 TABLET BY MOUTH ONCE FOR ONE DOSE AS NEEDED FOR MIGRAINE, Disp: 9 tablet, Rfl: 0    propranolol (INDERAL) 10 MG tablet, Take one 3 times daily, may increase to 2 twice daily in 2 wks. , migraine (Patient taking differently: 20 mg 2 times daily Take one 3 times daily, may increase to 2 twice daily in 2 wks. , migraine), Disp: 186 tablet, Rfl: 5    Multiple Vitamins-Minerals (MULTIVITAMIN ADULT PO), Take 1 tablet by mouth daily Indications: Bariatric Advantage , Disp: , Rfl:     omeprazole (PRILOSEC) 20 MG delayed release capsule, Take 20 mg by mouth 2 times daily , Disp: , Rfl:     Allergies: Allergies   Allergen Reactions    Ciprofloxacin Anaphylaxis       Social History:     Social History     Tobacco Use    Smoking status: Former Smoker     Packs/day: 0.50     Years: 22.00     Pack years: 11.00     Start date:      Last attempt to quit: 2015     Years since quittin.6    Smokeless tobacco: Never Used    Tobacco comment: quit smoking 2015   Substance Use Topics    Alcohol use: Yes     Alcohol/week: 1.0 standard drinks     Types: 1 Glasses of wine per week     Comment: monthly    Drug use: No       Physical Exam:     Vitals:    20 1031   BP: 105/62   Pulse: 80   Temp: 97.9 °F (36.6 °C)   TempSrc: Temporal   SpO2: 95%         Exam:  Const: Appears healthy and well developed. No signs of acute distress present. Vitals reviewed per triage. Head/Face: Normocephalic, atraumatic. Facies is symmetric. Eyes: PERRL. ENMT:  Nares are patent. Neck: Supple and symmetric. Trachea midline. Resp: Lungs are clear bilaterally. No adventitious sounds audible. CV: S1 is normal. S2 is normal.Pulses are equal bilaterally. Abdomen: Nondistended. Bowel sounds are positive. Soft and nontender. No rebound rigidity guarding. No CVA tenderness bilaterally. Musculo: Patient moves extremities without pain or limitation. No pedal edema. Skin: Skin is warm and dry. Neuro: Alert and oriented x3. Speech is articulate and fluent. Psych: Patient's mood and affect is appropriate to situation.     Testing:           Medical Decision Making: Clinical Impression:   Roz Sheth was seen today for nausea. Diagnoses and all orders for this visit:    Nausea  -     ondansetron (ZOFRAN) 4 MG tablet; Take 1 tablet by mouth every 4 hours as needed for Nausea or Vomiting        The patient is to call for any concerns or return if any of the signs or symptoms worsen. The patient is to follow-up with PCP in the next 2-3 days for repeat evaluation repeat assessment or go directly to the emergency department.      SIGNATURE: Lamberto Lao PA-C

## 2020-09-18 ENCOUNTER — OFFICE VISIT (OUTPATIENT)
Dept: FAMILY MEDICINE CLINIC | Age: 35
End: 2020-09-18
Payer: COMMERCIAL

## 2020-09-18 ENCOUNTER — HOSPITAL ENCOUNTER (OUTPATIENT)
Age: 35
Discharge: HOME | End: 2020-09-20
Payer: COMMERCIAL

## 2020-09-18 VITALS
TEMPERATURE: 97.4 F | SYSTOLIC BLOOD PRESSURE: 112 MMHG | OXYGEN SATURATION: 98 % | RESPIRATION RATE: 16 BRPM | HEART RATE: 70 BPM | DIASTOLIC BLOOD PRESSURE: 60 MMHG | BODY MASS INDEX: 40.57 KG/M2 | WEIGHT: 259 LBS

## 2020-09-18 LAB
BILIRUBIN, POC: NORMAL
BLOOD URINE, POC: NORMAL
CLARITY, POC: CLEAR
COLOR, POC: YELLOW
GLUCOSE URINE, POC: NORMAL
KETONES, POC: NORMAL
LEUKOCYTE EST, POC: NORMAL
NITRITE, POC: NORMAL
PH, POC: 6
PROTEIN, POC: NORMAL
SPECIFIC GRAVITY, POC: 1.02
UROBILINOGEN, POC: 1

## 2020-09-18 PROCEDURE — 87088 URINE BACTERIA CULTURE: CPT

## 2020-09-18 PROCEDURE — 81002 URINALYSIS NONAUTO W/O SCOPE: CPT | Performed by: INTERNAL MEDICINE

## 2020-09-18 PROCEDURE — 87186 SC STD MICRODIL/AGAR DIL: CPT

## 2020-09-18 PROCEDURE — 99212 OFFICE O/P EST SF 10 MIN: CPT | Performed by: INTERNAL MEDICINE

## 2020-09-18 PROCEDURE — 87077 CULTURE AEROBIC IDENTIFY: CPT

## 2020-09-18 RX ORDER — PHENAZOPYRIDINE HYDROCHLORIDE 200 MG/1
200 TABLET, FILM COATED ORAL 3 TIMES DAILY PRN
Qty: 9 TABLET | Refills: 0 | Status: SHIPPED | OUTPATIENT
Start: 2020-09-18 | End: 2020-09-21

## 2020-09-18 NOTE — PROGRESS NOTES
Historical Provider, MD   omeprazole (PRILOSEC) 20 MG delayed release capsule Take 20 mg by mouth 2 times daily  Yes Historical Provider, MD   ondansetron (ZOFRAN-ODT) 4 MG disintegrating tablet Take 1 tablet by mouth every 8 hours as needed for Nausea or Vomiting  Prabhakar Matta MD   albuterol sulfate HFA (VENTOLIN HFA) 108 (90 Base) MCG/ACT inhaler Inhale 2 puffs into the lungs 4 times daily as needed for Wheezing  Patient not taking: Reported on 2020  Tricia Drop, DO      Allergies   Allergen Reactions    Ciprofloxacin Anaphylaxis       Past Medical History:   Diagnosis Date    Allergic rhinitis     Anesthesia complication     pulse ox dropped    Arthritis     Chronic sinusitis     Chronic tension-type headache, intractable 2020    Constipation     Depression with anxiety     Earache     Folic acid deficiency     Migraine headache 2020    Migraines     Morbidly obese ([address]     PCOS (polycystic ovarian syndrome) 2020    Polycystic ovarian syndrome     Prolonged emergence from general anesthesia     RLS (restless legs syndrome)     Swelling of both lower extremities     Vitamin D deficiency 2020     Past Surgical History:   Procedure Laterality Date    APPENDECTOMY  2003     SECTION  2006    CHOLECYSTECTOMY  2009    COLONOSCOPY      DILATION AND CURETTAGE OF UTERUS  2013    HYSTEROSCOPY  2015    [facility]   LAPAROSCOPY    SAJI-EN-Y GASTRIC BYPASS N/A 2020    GASTRIC SLEEVE CONVERT TO  SAJI-EN-Y LAPAROSCOPIC performed by Prabhakar Matta MD at [place] [address]    [facility]    TUBAL LIGATION  2014    Abalation    UPPER GASTROINTESTINAL ENDOSCOPY N/A 2020    EGD BIOPSY performed [facility] at [facility]     Tobacco Use    Smoking status: Former Smoker     Packs/day: 0.50     Years: 22.00     Pack years: 11.00     Start date:      Last attempt to quit: 2015     Years since quittin.6    Smokeless tobacco: Never Used    Tobacco comment: quit smoking 2015   Substance Use Topics    Alcohol use: Yes     Alcohol/week: 1.0 standard drinks     Types: 1 Glasses of wine per week     Comment: monthly        Vitals:    20 0739   BP: 112/60   Pulse: 70   Resp: 16   Temp: 97.4 °F (36.3 °C)   SpO2: 98%   Weight: 259 lb (117.5 kg)     Estimated body mass index is 40.57 kg/m² as calculated from the following:    Height as of 20: 5' 7\" (1.702 m). Weight as of this encounter: 259 lb (117.5 kg). Physical Exam  Heart is regular rate and rhythm without gallops or clicks. Lungs are clear to auscultation without wheezes rales or rhonchi. No cervical lymphadenopathy noted. UA negative without any nitrates or leukocytes noted  ASSESSMENT/PLAN:  Audrey Azul was seen today for dysuria and urinary frequency. Diagnoses and all orders for this visit:    Low back pain without sciatica, unspecified back pain laterality, unspecified chronicity  -     POCT Urinalysis no Micro       The UA is negative the patient is very symptomatic as far as possible UTI. Not can use an antibiotic but will wait for the final results of the culture and sensitivity of the urine. In the meantime have given her Pyridium 200 mg 3 times daily. If her symptoms get worse throughout the weekend she is to give me a call. An electronic signature was used to authenticate this note.     --Michael Mayer, DO on 2020 at 8:11 AM

## 2020-09-21 LAB
ORGANISM: ABNORMAL
URINE CULTURE, ROUTINE: ABNORMAL

## 2020-09-22 ENCOUNTER — TELEPHONE (OUTPATIENT)
Dept: BARIATRICS/WEIGHT MGMT | Age: 35
End: 2020-09-22

## 2020-09-22 RX ORDER — ONDANSETRON 4 MG/1
4 TABLET, ORALLY DISINTEGRATING ORAL EVERY 8 HOURS PRN
Qty: 15 TABLET | Refills: 0 | Status: SHIPPED
Start: 2020-09-22 | End: 2021-03-19

## 2020-09-22 RX ORDER — NITROFURANTOIN 25; 75 MG/1; MG/1
100 CAPSULE ORAL 2 TIMES DAILY
Qty: 14 CAPSULE | Refills: 0 | Status: SHIPPED | OUTPATIENT
Start: 2020-09-22 | End: 2020-09-29

## 2020-09-22 NOTE — TELEPHONE ENCOUNTER
Patient called in and is very nauseated. We reviewed diet and she states she is doing everything she should. She was diagnoses with a UTI and now is on antibiotics. She requested refill of Zofran. She is getting adequate fluid intake. Per order of Dr. Crowder Button orders placed.

## 2020-09-28 ENCOUNTER — HOSPITAL ENCOUNTER (OUTPATIENT)
Age: 35
Discharge: HOME | End: 2020-09-30
Payer: COMMERCIAL

## 2020-09-28 LAB
ALBUMIN SERPL-MCNC: 3.9 G/DL (ref 3.5–5.2)
ALP BLD-CCNC: 65 U/L (ref 35–104)
ALT SERPL-CCNC: 28 U/L (ref 0–32)
ANION GAP SERPL CALCULATED.3IONS-SCNC: 16 MMOL/L (ref 7–16)
AST SERPL-CCNC: 20 U/L (ref 0–31)
BILIRUB SERPL-MCNC: 0.4 MG/DL (ref 0–1.2)
BUN BLDV-MCNC: 14 MG/DL (ref 6–20)
CALCIUM SERPL-MCNC: 9.3 MG/DL (ref 8.6–10.2)
CHLORIDE BLD-SCNC: 105 MMOL/L (ref 98–107)
CHOLESTEROL, TOTAL: 141 MG/DL (ref 0–199)
CO2: 21 MMOL/L (ref 22–29)
CREAT SERPL-MCNC: 0.7 MG/DL (ref 0.5–1)
FERRITIN: 54 NG/ML
FOLATE: 4.9 NG/ML (ref 4.8–24.2)
GFR AFRICAN AMERICAN: >60
GFR NON-AFRICAN AMERICAN: >60 ML/MIN/1.73
GLUCOSE BLD-MCNC: 80 MG/DL (ref 74–99)
HCT VFR BLD CALC: 42.6 % (ref 34–48)
HEMOGLOBIN: 13.3 G/DL (ref 11.5–15.5)
MCH RBC QN AUTO: 28.2 PG (ref 26–35)
MCHC RBC AUTO-ENTMCNC: 31.2 % (ref 32–34.5)
MCV RBC AUTO: 90.4 FL (ref 80–99.9)
PDW BLD-RTO: 15 FL (ref 11.5–15)
PLATELET # BLD: 282 E9/L (ref 130–450)
PMV BLD AUTO: 10.7 FL (ref 7–12)
POTASSIUM SERPL-SCNC: 3.9 MMOL/L (ref 3.5–5)
PREALBUMIN: 20 MG/DL (ref 20–40)
RBC # BLD: 4.71 E12/L (ref 3.5–5.5)
SODIUM BLD-SCNC: 142 MMOL/L (ref 132–146)
TOTAL PROTEIN: 7.1 G/DL (ref 6.4–8.3)
TRIGL SERPL-MCNC: 73 MG/DL (ref 0–149)
VITAMIN B-12: 552 PG/ML (ref 211–946)
VITAMIN D 25-HYDROXY: 23 NG/ML (ref 30–100)
WBC # BLD: 6.7 E9/L (ref 4.5–11.5)

## 2020-09-28 PROCEDURE — 84478 ASSAY OF TRIGLYCERIDES: CPT

## 2020-09-28 PROCEDURE — 85027 COMPLETE CBC AUTOMATED: CPT

## 2020-09-28 PROCEDURE — 82746 ASSAY OF FOLIC ACID SERUM: CPT

## 2020-09-28 PROCEDURE — 82728 ASSAY OF FERRITIN: CPT

## 2020-09-28 PROCEDURE — 82465 ASSAY BLD/SERUM CHOLESTEROL: CPT

## 2020-09-28 PROCEDURE — 84425 ASSAY OF VITAMIN B-1: CPT

## 2020-09-28 PROCEDURE — 80053 COMPREHEN METABOLIC PANEL: CPT

## 2020-09-28 PROCEDURE — 84134 ASSAY OF PREALBUMIN: CPT

## 2020-09-28 PROCEDURE — 82607 VITAMIN B-12: CPT

## 2020-09-28 PROCEDURE — 36415 COLL VENOUS BLD VENIPUNCTURE: CPT

## 2020-09-28 PROCEDURE — 84630 ASSAY OF ZINC: CPT

## 2020-09-28 PROCEDURE — 82306 VITAMIN D 25 HYDROXY: CPT

## 2020-10-01 LAB — ZINC: 58.9 UG/DL (ref 60–120)

## 2020-10-02 ENCOUNTER — OFFICE VISIT (OUTPATIENT)
Dept: BARIATRICS/WEIGHT MGMT | Age: 35
End: 2020-10-02
Payer: COMMERCIAL

## 2020-10-02 ENCOUNTER — INITIAL CONSULT (OUTPATIENT)
Dept: BARIATRICS/WEIGHT MGMT | Age: 35
End: 2020-10-02
Payer: COMMERCIAL

## 2020-10-02 VITALS
RESPIRATION RATE: 20 BRPM | BODY MASS INDEX: 39.39 KG/M2 | SYSTOLIC BLOOD PRESSURE: 122 MMHG | WEIGHT: 251 LBS | TEMPERATURE: 98.1 F | DIASTOLIC BLOOD PRESSURE: 59 MMHG | HEIGHT: 67 IN | HEART RATE: 71 BPM

## 2020-10-02 VITALS — HEIGHT: 67 IN | WEIGHT: 251 LBS | BODY MASS INDEX: 39.39 KG/M2

## 2020-10-02 LAB — VITAMIN B1 WHOLE BLOOD: 150 NMOL/L (ref 70–180)

## 2020-10-02 PROCEDURE — 99212 OFFICE O/P EST SF 10 MIN: CPT

## 2020-10-02 PROCEDURE — 99999 PR OFFICE/OUTPT VISIT,PROCEDURE ONLY: CPT | Performed by: DIETITIAN, REGISTERED

## 2020-10-02 PROCEDURE — 99213 OFFICE O/P EST LOW 20 MIN: CPT | Performed by: SURGERY

## 2020-10-02 RX ORDER — ERGOCALCIFEROL 1.25 MG/1
50000 CAPSULE ORAL WEEKLY
Qty: 12 CAPSULE | Refills: 0 | Status: SHIPPED | OUTPATIENT
Start: 2020-10-02

## 2020-10-02 NOTE — PROGRESS NOTES
Vitamins-Minerals (MULTIVITAMIN ADULT PO) Take 1 tablet by mouth daily Indications: Bariatric Advantage    Yes Historical Provider, MD   omeprazole (PRILOSEC) 20 MG delayed release capsule Take 20 mg by mouth 2 times daily  2/15/17  Yes Historical Provider, MD   ondansetron (ZOFRAN ODT) 4 MG disintegrating tablet Take 1 tablet by mouth every 8 hours as needed for Nausea or Vomiting  Patient not taking: Reported on 10/2/2020 9/22/20   Caroline Zimmerman MD   docusate sodium (COLACE) 100 MG capsule Take 1 capsule by mouth daily as needed for Constipation  Patient not taking: Reported on 10/2/2020 7/1/20   Caroline Zimmerman MD         Allergies   Allergen Reactions    Ciprofloxacin Anaphylaxis     Past Medical History:   Diagnosis Date    Allergic rhinitis     Anesthesia complication     pulse ox dropped    Arthritis     Chronic sinusitis     Chronic tension-type headache, intractable 2020    Constipation     Depression with anxiety     Earache     Folic acid deficiency     Migraine headache 2020    Migraines     Morbidly obese ([address]     PCOS (polycystic ovarian syndrome) 2020    Polycystic ovarian syndrome     Prolonged emergence from general anesthesia     RLS (restless legs syndrome)     Swelling of both lower extremities     Vitamin D deficiency 2020       Past Surgical History:   Procedure Laterality Date    APPENDECTOMY  2003     SECTION  2006    CHOLECYSTECTOMY  2009    COLONOSCOPY      DILATION AND CURETTAGE OF UTERUS  2013    HYSTEROSCOPY  2015    [facility]   LAPAROSCOPY    SAJI-EN-Y GASTRIC BYPASS N/A 2020    GASTRIC SLEEVE CONVERT TO  SAJI-EN-Y LAPAROSCOPIC performed by Caroline Zimmerman MD at [address] [place] [address]  2017    [facility]    TUBAL LIGATION  2014    Abalation    UPPER GASTROINTESTINAL ENDOSCOPY N/A 2020    EGD BIOPSY performed by Caroline Zimmerman MD at [facility] ENDOSCOPY       Current Outpatient Medications   Medication Sig Dispense Refill    Ferrous Sulfate (IRON PO) Take 1 tablet by mouth daily      Calcium Carbonate (CALCI-CHEW PO) Take 3 tablets by mouth daily      Elagolix Sodium (ORILISSA) 200 MG TABS Take 1 tablet by mouth 2 times daily 60 tablet 3    diclofenac sodium (VOLTAREN) 1 % GEL Apply 2 g topically 4 times daily 2 Tube 1    vitamin D (ERGOCALCIFEROL) 1.25 MG (50977 UT) CAPS capsule Take 1 capsule by mouth Twice a Week 24 capsule 1    albuterol sulfate HFA (VENTOLIN HFA) 108 (90 Base) MCG/ACT inhaler Inhale 2 puffs into the lungs 4 times daily as needed for Wheezing 1 Inhaler 0    SUMAtriptan (IMITREX) 50 MG tablet TAKE 1/2 TABLET BY MOUTH ONCE FOR ONE DOSE AS NEEDED FOR MIGRAINE 9 tablet 0    propranolol (INDERAL) 10 MG tablet Take one 3 times daily, may increase to 2 twice daily in 2 wks. , migraine (Patient taking differently: 20 mg 2 times daily Take one 3 times daily, may increase to 2 twice daily in 2 wks. , migraine) 186 tablet 5    Multiple Vitamins-Minerals (MULTIVITAMIN ADULT PO) Take 1 tablet by mouth daily Indications: Bariatric Advantage       omeprazole (PRILOSEC) 20 MG delayed release capsule Take 20 mg by mouth 2 times daily       ondansetron (ZOFRAN ODT) 4 MG disintegrating tablet Take 1 tablet by mouth every 8 hours as needed for Nausea or Vomiting (Patient not taking: Reported on 10/2/2020) 15 tablet 0    docusate sodium (COLACE) 100 MG capsule Take 1 capsule by mouth daily as needed for Constipation (Patient not taking: Reported on 10/2/2020) 30 capsule 0     No current facility-administered medications for this visit.         Allergies   Allergen Reactions    Ciprofloxacin Anaphylaxis       Family History   Problem Relation Age of Onset    High Blood Pressure Mother     High Cholesterol Mother     Arthritis Father     No Known Problems Sister     Cancer Maternal Grandmother     COPD Maternal Grandmother     Diabetes Maternal Grandmother     Elevated Lipids Maternal Grandmother     Hypertension Maternal Grandmother     Heart Disease Maternal Grandfather     Cancer Maternal Grandfather     Diabetes Maternal Grandfather        Social History     Socioeconomic History    Marital status:      Spouse name: Not on file    Number of children: Not on file    Years of education: Not on file    Highest education level: Not on file   Occupational History    Not on file   Social Needs    Financial resource strain: Not on file    Food insecurity     Worry: Not on file     Inability: Not on file   [employer] needs     Medical: Not on file     Non-medical: Not on file   Tobacco Use    Smoking status: Former Smoker     Packs/day: 0.50     Years: 22.00     Pack years: 11.00     Start date:      Last attempt to quit: 2015     Years since quittin.7    Smokeless tobacco: Never Used    Tobacco comment: quit smoking 2015   Substance and Sexual Activity    Alcohol use: Yes     Alcohol/week: 1.0 standard drinks     Types: 1 Glasses of wine per week     Comment: monthly    Drug use: No    Sexual activity: Yes   Lifestyle    Physical activity     Days per week: Not on file     Minutes per session: Not on file    Stress: Not on file   Relationships    Social connections     Talks on phone: Not on file     Gets together: Not on file     Attends Protestant service: Not on file     Active member of club or organization: Not on file     Attends meetings of clubs or organizations: Not on file     Relationship status: Not on file    Intimate partner violence     Fear of current or ex partner: Not on file     Emotionally abused: Not on file     Physically abused: Not on file     Forced sexual activity: Not on file   Other Topics Concern    Not on file   Social History Narrative    Not on file           A complete 10 system review was performed and are otherwise negative unless mentioned in the above HPI.  Specific negatives are listed below but may not include all those

## 2020-10-02 NOTE — PATIENT INSTRUCTIONS
Please continue to take your vitamin and mineral supplements as instructed. If you received a blood work prescription today for laboratory monitoring due prior to your next routine follow-up visit, please have this blood work obtained 10 to 14 days prior to your next visit. It is important to fast for 12 hours prior to routine weight loss surgery blood work, EXCEPT for drinking water, to ensure accuracy of results. Please report nausea, vomiting, abdominal pain, or any other problems you experience to your surgeon. For problems related to weight loss surgery, it is best to go to [address] [place] [address] Emergency Department and have your surgeon paged.

## 2020-10-02 NOTE — PROGRESS NOTES
Pt is here for 3 mth LYRGB f/u, labs are in [place]. Waiting for zinc to come back vitamin d and zinc is low. She's scheduled scheduled with Pj Vanessa for dietary. Water intake is 80 oz daily, having bowel movements daily, vitamin intake is good and getting protein through shakes and foods.

## 2020-10-02 NOTE — PROGRESS NOTES
Medical Nutrition Therapy (MNT) Assessment     Pt. Name: Melody Alexis   Date:10/2/2020  F/U Appt: Sx Type - 3 Month LSG to RYGB    Food Records Kept: No  24Hour Recall Completed at Office:No    Ht 5' 7\" (1.702 m)   Wt 251 lb (113.9 kg)   BMI 39.31 kg/m²  Height: 5' 7\" (1.702 m) Weight: 251 lb (113.9 kg)   IBW:ideal body weight   163 lbs % EBWL: 28%     Wt Readings from Last 3 Encounters:   10/02/20 251 lb (113.9 kg)   10/02/20 251 lb (113.9 kg)   09/25/20 256 lb (116.1 kg)                     Protein supplements: Pt. is currently using the following protein supplement Nectar and consuming the following grams of protein 66 grams. Rd / Ld reviewed with patient based on 1.0 gram per kg of IBW patient needs 74 - 84 grams of protein total daily. Subjective:                   Current MNT:       Bariatric soft diet with MVI, Calcium & Protein Supplements     MNT Advanced to:     Bariatric soft diet introducing raw fruit, raw vegetables, rice, protein bars, multivitamin, calcium, protein supplements      Allergies and Food Allergies and Food Intolerances: None - Pt states sometimes beef bothers the pt.     Nutritional Data  No - Poor appetite more than 5 days     No - NPO or clear liquid more than 3 days     No - Problem Chewing      No - Problem Swallowing      No - Problem Mouth Pain      No - Problem Denture  No - Missing Teeth         No - Pressure Sore    No - Open Wound    No - Surgical Wound  No - Documented: Sepsis or Infection    No - Nausea  No - Vomiting    SWLC Bowel Protocol:  Patient states he / she has the following bowel movements per week  - Daily  No - Diarrhea  No - Steatorrhea  No - Constipation  When was your last bowel movement - This morning  How much plain water are you drinking daily - 80 oz   What other beverages / fluids are you drinking daily and the amount - Decaf Tea ocasionally  No - Are you taking Colace daily  What amount of Colace are you taking daily - Pt was having diarrhea from it. No - Are you taking Sugar Free Chewable Fiber Gummies  What amount of Sugar Free Chewable Fiber Gummies are you taking daily  - NA  Yes - Did your surgeon discuss constipation with you? No - Did your surgeon discuss increasing water intake? How much water were you instructed to take daily?  - Joey Langston instructed pt on 64 - 90 oz   No - Did your surgeon discuss continuing Colace? How much Colace did your surgeon instruct you to take? N/A  No - Did your surgeon discuss stopping Colace? Yes - Did your surgeon discuss starting Fiber Gummies? How much Fiber Gummies did your surgeon instruct you to take? Joey Langston instructed pt on - 15 grams with gradual introduction over 3 weeks. Did your surgeon discuss any other medications / supplements to take daily to move your bowels and avoid constipation? No  Yes Patient was provided today the Constipation Handout  Yes Joey Langston reinforced pt needs to consume the following - Water Intake should be 64 - 90 oz, Fiber Chews 15 grams, Dietary Fiber Intake 25 - 35 grams       No - Hair loss   No - Weight regain       No - Acid Reflux  No - Dumping Syndrome      No - Food gets stuck  Yes - Are you eating solids - should not be eating solids until 6 weeks post-op. not applicable - Night Time Coughing  not applicable -  B Ping Noted  Yes - Are you chewing thoroughly  - Does not take effect until 6 weeks post-op  Yes - Are you hungry after eating - Ocasionally     How many meals a day 4 to 6 / Portions Sizes of Meals are 4 oz         Labs: 9/28/20 - Zinc 58.9L, Vitamin D 23L    [facility] - 1985     Note:  Per Dr. Micheline Chamorro and Dr. Andrea Holliday orders on 10/2/20 pt was diagnosed with a Zinc and Vitamin D Deficiency.   Patient stated he / she was taking the following supplements Bariatric Advantage Multi-Vitamin 1 tablet 2 times Daily, Bariatric Advantage 29 mgs Iron 1 tablet Daily, Bariatric Advanatge Calcium Lozenges 1 tablet 3 times Daily , Dry Vitamin D3 5,000iu every day. The patient was instructed  that he / she needs to continue his / her current supplements and start Vitamin D 50,000iu once a week for 12 weeks and Zinc 50 mgs 1 tablet every Monday, Wednesday and Friday for 6 weeks lab recheck at week 7. Pt was instructed on his surgeons and Dr. Thiago Beverly deficiency treatment plan. Patient was also  provided a list of correct Bariatric Multi-Vitamin's, Bariatric approved Iron, Vitamin D3 5,000iu daily and the correct Bariatric Calcium supplements with correct dosing. Thanks for allowing us to participate in your patient's care. Dr. Shamar Hurley and [facility]  Phone: [phone]  Fax: [phone]  Faxed [address] [facility] to PCP    Zinc:  Lab Recheck:Nov 20th  Follow-Up Appointment: Dec 4th 2020    Vitamin D:  Lab Recheck: Dec 25th with 6 Month Lab Work  Follow-Up Appointment: Jan 8th 2021      Supplements: Bariatric Advantage Multi-Vitamin 1 tablet 2 times Daily, Bariatric Advantage 29 mgs Iron 1 tablet Daily, Bariatric Advanatge Calcium Lozenges 1 tablet 3 times Daily , Dry Vitamin D3 5,000iu every day    Estimated Daily Nutritional Needs: Based on Bariatric procedure for Wt. Loss  Energy: Will be calculated at Maintenance Stage    Protein: 60 - 80 gms Daily    MNT Plan and Additional Education: RD/LD reviewed Bariatric Soft Diet incorporating in Raw Fruits, Raw Vegetables, Red Meat, and Rice. Encouraged pt to meet protein and fluid needs daily. Handouts given. Pt. verbalized understanding. Pt instructed to call with questions. Patient was instructed on the importance of increasing water intake to 48 - 64 oz. of water total daily. Pt. was also instructed he / she is allowed an additional 30 oz. of sugar free caffeine free clear liquid beverages for a total of 90 oz. of fluid total daily. Pt. was able to verbalize how he / she can get more water and fluids within the diet. Pt. verbalized understanding.  A high fiber diet oral protein intake within the diet. Pt. can verbalize he / she will need to consume 60 - 80 grams. · Rd / Ld instructed the patient on how to increase the use of protein supplements within the diet. · Pt. was instructed on how to increase water intake. Patient will need to consume 48 - 64 oz. of just plain water in addition to 30 oz. of non-caloric beverages. · Handouts given to patient  · RD / LD encouraged oral intake    · RD / LD encouraged pt. to keep food records.       · Pt. is able to verbalize diet concepts      Yes - Constipation Handout Given and Reviewed    Yes - High Fiber Handout Given and Reviewed      No - Ulcer Handout Given and Reviewed          No - Pt. was instructed to continue current MNT   Yes - Pt. diet was advanced to the following stage ( See above)   Yes - Supplements: initiated (See list below  - Pt. given instruction)     No - Supplemental foods:______________________  No - Pt. was placed on a altered meal schedule    Good - Dietary Compliance

## 2020-10-02 NOTE — PATIENT INSTRUCTIONS
High Vitamin D Foods:  Written By: Elba Encarnacion RD/MARLENE    What role does Vitamin D play in the body? Vitamin D is known as the sunshine vitamin. Vitamin D is actually a hormone that is produced in our bodies by ultraviolet B rays. These light rays trigger the production of vitamin D by our skin cells. The hormone that is produced is called calcitriol and once it is made it travels to the intestines to help with the absorption of dietary calcium, as well as fluoride. How does Vitamin D work? Vitamin D and Calcium work together to promote the growth of bones and development of healthy teeth. The amount of vitamin D you produce effects the amount of calcium that can be absorbed by the body. When calcium from foods you eat reaches the intestines, it waits for the presence of the vitamin D hormone, calcitriol, to be able to cross the intestinal membranes and to be transported to your bones. Without sufficient vitamin D, calcium levels in the bones and teeth will decrease leading to weak, brittle bones and increasing the possibility of osteoporosis. WHY? Calcium is primarily found in the blood and needs to remain balanced at a constant level. The calcium that is in the blood is there to be used for quick transport to muscles and nerves when required. When calcium is sent to required organs, the calcium levels in the blood become low. Calcium is then pulled out of the bones and goes into the blood to replace what was sent to the muscles or nerves. This is the normal cycle and is perfect if you have a continuous supply of calcium from the diet to replace calcium from bones and teeth to keep blood levels steady. If dietary calcium levels are depleted, the blood will keep pulling calcium away from the bones and teeth and the structures will be weak. Where can Vitamin D be found? Vitamin D can be self-synthesized with sunlight.     Vitamin D is also found in fortified milk, fortified margarine, egg yolks, liver and fatty fish. What occurs when you are deficient in Vitamin D? Bones and Teeth:  Lindia Grim can occur with Vitamin D deficiency, which is known as softening of the bones and teeth. Due to the softening effect we can see deformities of the limbs, spine, thorax and pelvis. Also seen is pain in the pelvis, lower back and legs, including increase risk to bone fractures. Blood:  Vitamin D deficiency can cause decreased calcium and/or phosphorus in the blood and increased alkaline phosphatase. Nervous/Muscular Systems:  Vitamin D deficiency can cause lax muscles resulting in protrusion of the abdomen (Pot Belly) and muscle spasms. Some patients complain of involuntary twitching and  muscle spasms. Excretory System:  Vitamin D deficiency can cause increased calcium in the stool and decreased calcium in the urine. Other:  Vitamin D deficiency can cause abnormally high secretions of parathormone, which is why we run lab work at 1 year to make sure the calcium supplements you are taking are being absorbed correctly. Vitamin D in Commonly Eaten Foods Ranked Richest to [employer]:      Food Serving Size IU of   Vitamin D----------        Herring, fresh, raw,  1 oz. 255 IU   [place] 1 oz. 142 IU   Milk,Cows Fortified 1 Cup 100 IU   Sardines, canned 1 oz. 85 IU   Chicken-Liver, Cooked 3oz. 45 IU   Shrimp, Canned 1oz. 30 IU   Egg Yolk 1 25 IU   Calf-Liver, Cooked 3oz. 12 IU   Cream, Light 1 T 8 IU   Cheddar Cheese 1 oz. 3 IU   Oysters 4 3 IU   Butter  1 tsp 1.4 IU            Unfortunately after RYGB surgery you will not be able to increase your Vitamin D with diet alone. A Vitamin D supplement will be needed in order to improve Vitamin D lab values. High Zinc Foods  Written By: Pipo Powell RD/LD    What role does Zinc play in the body? Zinc supports many metabolic processes. Zinc assists in white blood cell immune function and in growth and development.   Zinc interacts with platelets in blood clotting, affects thyroid hormone function and affects behavior and learning performance. It is essential in the normal salt-taste perception, wound healing, and fetal development. It forms part of more than 70 body enzymes and helps the body use carbohydrates, protein and fat. Deficiencies can interfere with weight loss. Where can Zinc be found? Zinc is highest in foods of high protein content, such as shellfish, meats and liver. Eggs and whole-grain products are good sources of zinc if large quantities are eaten, but you cannot do this after gastric bypass surgery. What occurs when you are deficient in Zinc?  Zinc deficiency can cause diarrhea. It drastically impairs the immune response, making infections likely. Chronic zinc deficiency impairs the central nervous system and brain functioning. Because zinc deficiency directly impairs vitamin A metabolism, vitamin A deficiency symptoms often appear. Zinc deficiency also disturbs thyroid function and metabolic rate. It alters taste, causes anorexia and slows wound healing. Zinc in Commonly Eaten Foods Ranked Richest to [employer]:    Food   Serving Size   Milligrams of   Zinc      Oysters   1 cup raw (171 Kcal)    225 mg     Sirloin Steak   3oz. lean (171 Kcal)   6 mg     Crabmeat   1 cup canned (133 Kcal)   6 mg     Montes Chop   3oz. braised (293 Kcal)   6 mg     Beef Pot Roast   3 oz. lean (233 Kcal)   4 mg     Ground Beef   3 oz. (243 Kcal)    4 mg     Italian  [place] ([facility])   3 oz. (145 Kcal)   4 mg     Yogurt, Non-Fat   1cup (136 Kcal)    4 mg     Food   Serving Size   Milligrams of   Zinc      Black-Eyed Peas    1 cup cooked (198 Kcal)   4 mg     Gilbert Beans   1cup cooked (234 Kcal)    3 mg     Shrimp   3.5 oz. boiled (98 Kcal)    3 mg     Green Peas   1 cup cooked (125 Kcal)   3 mg     Kidney Beans   1 cup canned (217 Kcal)   3 mg     Spinach   1 cup cooked (41 Kcal)   3 mg     Low-Fat Swiss cheese   1 oz.   (106 Kcal)   3 mg     Milk, Non-Fat   1 cup (86 Kcal)   3 mg     Tofu(Soybean Curd)   ½ cup (94 Kcal)   3 mg     Cheddar Cheese  Low-Fat     1 oz. (114 Kcal)   3 mg       Unfortunately, after bariatric surgery, you will not be able to increase your zinc with diet alone. A zinc supplement may need to be added in order to improve zinc lab values. Please take zinc with some form of protein in the stomach.

## 2020-10-26 ENCOUNTER — OFFICE VISIT (OUTPATIENT)
Dept: FAMILY MEDICINE CLINIC | Age: 35
End: 2020-10-26
Payer: COMMERCIAL

## 2020-10-26 VITALS
HEART RATE: 82 BPM | SYSTOLIC BLOOD PRESSURE: 120 MMHG | TEMPERATURE: 97.3 F | WEIGHT: 251 LBS | BODY MASS INDEX: 39.31 KG/M2 | DIASTOLIC BLOOD PRESSURE: 70 MMHG | RESPIRATION RATE: 16 BRPM | OXYGEN SATURATION: 97 %

## 2020-10-26 PROCEDURE — 20552 NJX 1/MLT TRIGGER POINT 1/2: CPT | Performed by: INTERNAL MEDICINE

## 2020-10-26 PROCEDURE — 99213 OFFICE O/P EST LOW 20 MIN: CPT | Performed by: INTERNAL MEDICINE

## 2020-10-26 RX ORDER — BUPIVACAINE HYDROCHLORIDE 5 MG/ML
0.2 INJECTION, SOLUTION PERINEURAL ONCE
Status: COMPLETED | OUTPATIENT
Start: 2020-10-26 | End: 2020-10-26

## 2020-10-26 RX ORDER — KETOROLAC TROMETHAMINE 30 MG/ML
15 INJECTION, SOLUTION INTRAMUSCULAR; INTRAVENOUS ONCE
Status: COMPLETED | OUTPATIENT
Start: 2020-10-26 | End: 2020-10-26

## 2020-10-26 RX ADMIN — BUPIVACAINE HYDROCHLORIDE 1 MG: 5 INJECTION, SOLUTION PERINEURAL at 16:10

## 2020-10-26 RX ADMIN — KETOROLAC TROMETHAMINE 15 MG: 30 INJECTION, SOLUTION INTRAMUSCULAR; INTRAVENOUS at 16:11

## 2020-10-26 NOTE — PROGRESS NOTES
10/26/2020     Rafe Aase Randall-Covert (:  1985) is a 28 y.o. female, here for evaluation of left hip pain. This starts in the lower back radiates around to the left hip. She does not remember any injury and the pain just started today. She is a gastric bypass patient is not permitted to take any NSAID therapy and to absolutely avoid steroid use orally due to prostaglandin inhibition. She takes Tylenol on an as-needed basis. She did not do any lifting or injury that she is aware of to the lower back. Chief Complaint   Patient presents with    Hip Pain     left side          Review of Systems    Prior to Visit Medications    Medication Sig Taking? Authorizing Provider   vitamin D (ERGOCALCIFEROL) 1.25 MG (42373 UT) CAPS capsule Take 1 capsule by mouth once a week Yes Lowell Marcos MD   zinc 50 MG CAPS Take 50 mg by mouth See Admin Instructions Take Zinc Gluconate 50 mgs 1 tablet every Monday, Wednesday and Friday Yes Lowell Marcos MD   ondansetron (ZOFRAN ODT) 4 MG disintegrating tablet Take 1 tablet by mouth every 8 hours as needed for Nausea or Vomiting Yes Lowell Marcos MD   Ferrous Sulfate (IRON PO) Take 1 tablet by mouth daily Yes Historical Provider, MD   Calcium Carbonate (CALCI-CHEW PO) Take 3 tablets by mouth daily Yes Historical Provider, MD   Elagolix Sodium (ORILISSA) 200 MG TABS Take 1 tablet by mouth 2 times daily Yes Sheldon Gonzalez MD   SUMAtriptan (IMITREX) 50 MG tablet TAKE 1/2 TABLET BY MOUTH ONCE FOR ONE DOSE AS NEEDED FOR MIGRAINE Yes Sara Mejía DO   propranolol (INDERAL) 10 MG tablet Take one 3 times daily, may increase to 2 twice daily in 2 wks. , migraine  Patient taking differently: 20 mg 2 times daily Take one 3 times daily, may increase to 2 twice daily in 2 wks. , migraine Yes Lor Ospina MD   Multiple Vitamins-Minerals (MULTIVITAMIN ADULT PO) Take 1 tablet by mouth daily Indications: Bariatric Advantage  Yes Historical Provider, MD omeprazole (PRILOSEC) 20 MG delayed release capsule Take 20 mg by mouth 2 times daily  Yes Historical Provider, MD   diclofenac sodium (VOLTAREN) 1 % GEL Apply 2 g topically 4 times daily  Judye Kayser, DO   vitamin D (ERGOCALCIFEROL) 1.25 MG (17006 UT) CAPS capsule Take 1 capsule by mouth Twice a Week  Paulina Primrose, MD      Allergies   Allergen Reactions    Ciprofloxacin Anaphylaxis       Past Medical History:   Diagnosis Date    Allergic rhinitis     Anesthesia complication     pulse ox dropped    Arthritis     Chronic sinusitis     Chronic tension-type headache, intractable 2020    Constipation     Depression with anxiety     Earache     Folic acid deficiency     Migraine headache 2020    Migraines     Morbidly obese ([address]     PCOS (polycystic ovarian syndrome) 2020    Polycystic ovarian syndrome     Prolonged emergence from general anesthesia     RLS (restless legs syndrome)     Swelling of both lower extremities     Vitamin D deficiency 2020     Past Surgical History:   Procedure Laterality Date    APPENDECTOMY       SECTION  2006    CHOLECYSTECTOMY      COLONOSCOPY      DILATION AND CURETTAGE OF UTERUS      HYSTEROSCOPY  2015    [facility]   LAPAROSCOPY    SAJI-EN-Y GASTRIC BYPASS N/A 2020    GASTRIC SLEEVE CONVERT TO  SAJI-EN-Y LAPAROSCOPIC performed by Paulina Primrose, MD at [address] [place] [address]    [facility]    TUBAL LIGATION  2014    Abalation    UPPER GASTROINTESTINAL ENDOSCOPY N/A 2020    EGD BIOPSY performed by Paulina Primrose, MD at [facility] History     Tobacco Use    Smoking status: Former Smoker     Packs/day: 0.50     Years: 22.00     Pack years: 11.00     Start date:      Last attempt to quit: 2015     Years since quittin.8    Smokeless tobacco: Never Used    Tobacco comment: quit smoking 2015   Substance Use Topics    Alcohol use:  Yes Alcohol/week: 1.0 standard drinks     Types: 1 Glasses of wine per week     Comment: monthly        Vitals:    10/26/20 1536   BP: 120/70   Pulse: 82   Resp: 16   Temp: 97.3 °F (36.3 °C)   SpO2: 97%   Weight: 251 lb (113.9 kg)     Estimated body mass index is 39.31 kg/m² as calculated from the following:    Height as of 10/2/20: 5' 7\" (1.702 m). Weight as of this encounter: 251 lb (113.9 kg). Physical Exam  Musculoskeletal:      Comments: Straight leg raises bilaterally negative. Area of tenderness is noted most dense at the S2 level 2 inches lateral of midline. No rash noted of the skin. She favors the left buttock and hip. I recommended a trigger point injection and she agreed. The area was cleansed with alcohol. Using a blocking agent of 0.5 cc Sensorcaine/0.5 cc Toradol trigger point injection was given today and tolerated well. ASSESSMENT/PLAN:  Hawa Lord was seen today for hip pain. Diagnoses and all orders for this visit:    Sciatica of left side  -     ketorolac (TORADOL) injection 15 mg  -     bupivacaine (MARCAINE) 0.5 % injection 1 mg       Trigger point injection was given today containing the Toradol and Sensorcaine. Try to avoid any steroid use because of her bypass surgery. If this pain continues to recur she can present back for another injection. Trying to avoid any oral medications also that she is not permitted to take any NSAIDs orally. An electronic signature was used to authenticate this note.     --Yaima Ac DO on 10/26/2020 at 4:10 PM

## 2020-11-02 ENCOUNTER — OFFICE VISIT (OUTPATIENT)
Dept: PODIATRY | Age: 35
End: 2020-11-02
Payer: COMMERCIAL

## 2020-11-02 VITALS — RESPIRATION RATE: 14 BRPM | WEIGHT: 251 LBS | BODY MASS INDEX: 39.39 KG/M2 | HEIGHT: 67 IN

## 2020-11-02 PROCEDURE — 99213 OFFICE O/P EST LOW 20 MIN: CPT | Performed by: PODIATRIST

## 2020-11-02 NOTE — PROGRESS NOTES
Pt is in office today to f/u in regards to her right ankle pain. She is wearing a custom brace today. She states her ankle has become more bothersome over the last 2-3 weeks. Malcolm Chung : 1985 Sex: female  Age: 28 y.o. Patient was referred by Jennifer Madedn DO    CC:    Follow-up pain right foot        HPI:   Presents today follow-up pain right foot. Tolerating custom ankle brace very well. No ankle pain today. States majority of pain is on top of the inside of the right foot. Does have pain on top of the right foot. Has been working on her feet more and states does have tenderness at the end of the day. Denies any calf pain today. No new injuries. States the brace does not seem to help with the foot as much. Would like to discuss surgical options today.             ROS:  Const: Denies constitutional symptoms  Musculo: Denies symptoms other than stated above  Skin: Denies symptoms other than stated above       Current Outpatient Medications:     vitamin D (ERGOCALCIFEROL) 1.25 MG (26991 UT) CAPS capsule, Take 1 capsule by mouth once a week, Disp: 12 capsule, Rfl: 0    zinc 50 MG CAPS, Take 50 mg by mouth See Admin Instructions Take Zinc Gluconate 50 mgs 1 tablet every Monday, Wednesday and Friday, Disp: 30 capsule, Rfl: 0    ondansetron (ZOFRAN ODT) 4 MG disintegrating tablet, Take 1 tablet by mouth every 8 hours as needed for Nausea or Vomiting, Disp: 15 tablet, Rfl: 0    Ferrous Sulfate (IRON PO), Take 1 tablet by mouth daily, Disp: , Rfl:     Calcium Carbonate (CALCI-CHEW PO), Take 3 tablets by mouth daily, Disp: , Rfl:     Elagolix Sodium (ORILISSA) 200 MG TABS, Take 1 tablet by mouth 2 times daily, Disp: 60 tablet, Rfl: 3    diclofenac sodium (VOLTAREN) 1 % GEL, Apply 2 g topically 4 times daily, Disp: 2 Tube, Rfl: 1    vitamin D (ERGOCALCIFEROL) 1.25 MG (52586 UT) CAPS capsule, Take 1 capsule by mouth Twice a Week, Disp: 24 capsule, Rfl: 1    SUMAtriptan present right lower leg. Negative Moore. Dorsiflexion, plantarflexion, inversion, eversion bilateral 5 out of 5 muscle strength  Wiggling toes  Negative Homans  Pes planovalgus worse on the right  Tenderness first tarsometatarsal joint right foot. Decreased dorsiflexion with knee both flexed and extended right lower leg. There is tenderness entire first tarsometatarsal joint with range of motion. Crepitus noted. Dermatology examination:    No open skin lesions or abrasions bilateral lower extremity. Assessment and Plan:  Obed Marti was seen today for ankle pain and follow-up. Diagnoses and all orders for this visit:    Rupture of right posterior tibialis tendon, subsequent encounter  -     XR FOOT RIGHT (MIN 3 VIEWS); Future    Arthritis, midfoot    Short Achilles tendon (acquired), right ankle    Pain in right foot        Patient seen follow-up pain right foot. No heel pain today. Majority the pain present is overlying the first tarsometatarsal joint right foot. I did discuss continued conservative or surgical options. Continue ankle supportive brace. Patient wishing to proceed with surgical intervention at this time. The reason for surgery is due to failed conservative treatment and/or conservative treatment is not a viable option. It was discussed with patient that compliance post operatively is of the utmost importance. Any deviation on behalf of the patient will decrease the chances of a successful outcome. The risks of surgery were discussed in detail with the patient. They include; infection, failure, prolonged pain, swelling, numbness, recurrence, limited mobility, painful scar, RSDS, overcorrection, undercorrection, continued pain, infection and loss of limb/life. It was also discussed in detail that no guarantees could be made in regards to a good cosmetic result. The patient understands all of the complications.   All questions were thoroughly answered and at the patient wishes to proceed with the proposed surgery. Signed consent located in the patient record. Consent to read as follows:    #1 Fusion first tarsometatarsal joint right foot  2. Tendo Achilles lengthening right lower leg    Plan for surgery within the next month. Will need to see her primary care physician Dr. Khushboo Armstrong. Appreciate his input very much. Return in about 1 month (around 12/2/2020). Seen By:  Clotilde Tobias DPM      Document was created using voice recognition software. Note was reviewed, however may contain grammatical errors.

## 2020-11-05 ENCOUNTER — NURSE ONLY (OUTPATIENT)
Dept: PRIMARY CARE CLINIC | Age: 35
End: 2020-11-05

## 2020-11-05 DIAGNOSIS — Z20.822 EXPOSURE TO COVID-19 VIRUS: ICD-10-CM

## 2020-11-08 LAB
SARS-COV-2: NOT DETECTED
SOURCE: NORMAL

## 2020-12-15 ENCOUNTER — TELEPHONE (OUTPATIENT)
Dept: BARIATRICS/WEIGHT MGMT | Age: 35
End: 2020-12-15

## 2020-12-15 ENCOUNTER — HOSPITAL ENCOUNTER (OUTPATIENT)
Age: 35
Discharge: HOME | End: 2020-12-17
Payer: COMMERCIAL

## 2020-12-15 PROCEDURE — U0003 INFECTIOUS AGENT DETECTION BY NUCLEIC ACID (DNA OR RNA); SEVERE ACUTE RESPIRATORY SYNDROME CORONAVIRUS 2 (SARS-COV-2) (CORONAVIRUS DISEASE [COVID-19]), AMPLIFIED PROBE TECHNIQUE, MAKING USE OF HIGH THROUGHPUT TECHNOLOGIES AS DESCRIBED BY CMS-2020-01-R: HCPCS

## 2020-12-15 NOTE — TELEPHONE ENCOUNTER
Called to return patients mychart question. Patient unavailable, LM asking her to call office. Number given.

## 2020-12-15 NOTE — TELEPHONE ENCOUNTER
Patient called back and her pain in upper region of stomach. She did get a refill on zofran from her pcp and I suggested to restart omeprazole for possible ulcer. She will do this for 2 weeks and call us back to see if it improves.

## 2020-12-17 LAB
SARS-COV-2: NOT DETECTED
SOURCE: NORMAL

## 2020-12-17 NOTE — PROGRESS NOTES
[facility] PRE-ADMISSION TESTING INSTRUCTIONS    The Preadmission Testing patient is instructed accordingly using the following criteria (check applicable):    ARRIVAL INSTRUCTIONS:  [x] Parking the day of Surgery is located in the Main Entrance lot. Upon entering the door, make an immediate right to the surgery reception desk    [x] Bring photo ID and insurance card    [] Bring in a copy of Living will or Durable Power of  papers. [x] Please be sure to arrange for responsible adult to provide transportation to and from the hospital    [x] Please arrange for responsible adult to be with you for the 24 hour period post procedure due to having anesthesia      GENERAL INSTRUCTIONS:    [x] Nothing by mouth after midnight, including gum, candy, mints or water    [x] You may brush your teeth, but do not swallow any water    [x] Take medications as instructed with 1-2 oz of water    [x] Stop herbal supplements and vitamins 5 days prior to procedure    [] Follow preop dosing of blood thinners per physician instructions    [] Take 1/2 dose of evening insulin, but no insulin after midnight    [] No oral diabetic medications after midnight    [] If diabetic and have low blood sugar or feel symptomatic, take 1-2oz apple juice only    [] Bring inhalers day of surgery    [] Bring C-PAP/ Bi-Pap day of surgery    [x] Bring urine specimen day of surgery    [x] Shower or bath with soap, lather and rinse well, AM of Surgery, no lotion, powders or creams to surgical site    [] Follow bowel prep as instructed per surgeon    [x] No tobacco products within 24 hours of surgery     [x] No alcohol or illegal drug use within 24 hours of surgery.     [x] Jewelry, body piercing's, eyeglasses, contact lenses and dentures are not permitted into surgery (bring cases)      [x] Please do not wear any nail polish, make up or hair products on the day of surgery    [x] You can expect a call the business day prior to procedure to notify you if your arrival time changes    [x] If you receive a survey after surgery we would greatly appreciate your comments    [] Parent/guardian of a minor must accompany their child and remain on the premises  the entire time they are under our care     [] Pediatric patients may bring favorite toy, blanket or comfort item with them    [] A caregiver or family member must remain with the patient during their stay if they are mentally handicapped, have dementia, disoriented or unable to use a call light or would be a safety concern if left unattended    [x] Please notify surgeon if you develop any illness between now and time of surgery (cold, cough, sore throat, fever, nausea, vomiting) or any signs of infections  including skin, wounds, and dental.    [x]  The Outpatient Pharmacy is available to fill your prescription here on your day of surgery, ask your preop nurse for details    [x] Other instructions: Wear comfortable clothing    EDUCATIONAL MATERIALS PROVIDED:    [] PAT Preoperative Education Packet/Booklet     [] Medication List    [] Transfusion bracelet applied with instructions    [] Shower with soap, lather and rinse well, and use CHG wipes provided the evening before surgery as instructed    [] Incentive spirometer with instructions          Have you been tested for COVID  Yes (12-)          Have you been told you were positive for COVID No  Have you had any known exposure to someone that is positive for COVID No  Do you have a cough                   No              Do you have shortness of breath No                 Do you have a sore throat            No                Are you having chills                    No                Are you having muscle aches. No                    Please come to the hospital wearing a mask and have your significant other wear a mask as well.   Both of you should check your temperature before leaving to come here,  if it is 100 or higher please call [phone] for instruction.

## 2020-12-21 ENCOUNTER — ANESTHESIA EVENT (OUTPATIENT)
Dept: OPERATING ROOM | Age: 35
End: 2020-12-21
Payer: COMMERCIAL

## 2020-12-21 ENCOUNTER — APPOINTMENT (OUTPATIENT)
Dept: GENERAL RADIOLOGY | Age: 35
End: 2020-12-21
Attending: PODIATRIST
Payer: COMMERCIAL

## 2020-12-21 ENCOUNTER — HOSPITAL ENCOUNTER (OUTPATIENT)
Age: 35
Setting detail: OUTPATIENT SURGERY
Discharge: HOME | End: 2020-12-21
Attending: PODIATRIST | Admitting: PODIATRIST
Payer: COMMERCIAL

## 2020-12-21 ENCOUNTER — ANESTHESIA (OUTPATIENT)
Dept: OPERATING ROOM | Age: 35
End: 2020-12-21
Payer: COMMERCIAL

## 2020-12-21 VITALS
TEMPERATURE: 94.1 F | SYSTOLIC BLOOD PRESSURE: 100 MMHG | DIASTOLIC BLOOD PRESSURE: 66 MMHG | OXYGEN SATURATION: 92 % | RESPIRATION RATE: 5 BRPM

## 2020-12-21 VITALS
RESPIRATION RATE: 20 BRPM | OXYGEN SATURATION: 96 % | HEIGHT: 67 IN | HEART RATE: 67 BPM | DIASTOLIC BLOOD PRESSURE: 53 MMHG | WEIGHT: 239 LBS | TEMPERATURE: 96.8 F | SYSTOLIC BLOOD PRESSURE: 96 MMHG | BODY MASS INDEX: 37.51 KG/M2

## 2020-12-21 LAB — HCG(URINE) PREGNANCY TEST: NEGATIVE

## 2020-12-21 PROCEDURE — 2709999900 HC NON-CHARGEABLE SUPPLY: Performed by: PODIATRIST

## 2020-12-21 PROCEDURE — 6360000002 HC RX W HCPCS: Performed by: PODIATRIST

## 2020-12-21 PROCEDURE — 2500000003 HC RX 250 WO HCPCS: Performed by: NURSE ANESTHETIST, CERTIFIED REGISTERED

## 2020-12-21 PROCEDURE — 7100000000 HC PACU RECOVERY - FIRST 15 MIN: Performed by: PODIATRIST

## 2020-12-21 PROCEDURE — 88311 DECALCIFY TISSUE: CPT

## 2020-12-21 PROCEDURE — 6360000002 HC RX W HCPCS: Performed by: ANESTHESIOLOGY

## 2020-12-21 PROCEDURE — 7100000011 HC PHASE II RECOVERY - ADDTL 15 MIN: Performed by: PODIATRIST

## 2020-12-21 PROCEDURE — 2580000003 HC RX 258: Performed by: NURSE ANESTHETIST, CERTIFIED REGISTERED

## 2020-12-21 PROCEDURE — 3209999900 FLUORO FOR SURGICAL PROCEDURES

## 2020-12-21 PROCEDURE — 88304 TISSUE EXAM BY PATHOLOGIST: CPT

## 2020-12-21 PROCEDURE — 81025 URINE PREGNANCY TEST: CPT

## 2020-12-21 PROCEDURE — 6370000000 HC RX 637 (ALT 250 FOR IP): Performed by: ANESTHESIOLOGY

## 2020-12-21 PROCEDURE — 2500000003 HC RX 250 WO HCPCS: Performed by: PODIATRIST

## 2020-12-21 PROCEDURE — 3600000013 HC SURGERY LEVEL 3 ADDTL 15MIN: Performed by: PODIATRIST

## 2020-12-21 PROCEDURE — 27685 REVISION OF LOWER LEG TENDON: CPT | Performed by: PODIATRIST

## 2020-12-21 PROCEDURE — 3700000000 HC ANESTHESIA ATTENDED CARE: Performed by: PODIATRIST

## 2020-12-21 PROCEDURE — 7100000001 HC PACU RECOVERY - ADDTL 15 MIN: Performed by: PODIATRIST

## 2020-12-21 PROCEDURE — 28740 FUSION OF FOOT BONES: CPT | Performed by: PODIATRIST

## 2020-12-21 PROCEDURE — 3700000001 HC ADD 15 MINUTES (ANESTHESIA): Performed by: PODIATRIST

## 2020-12-21 PROCEDURE — 6360000002 HC RX W HCPCS: Performed by: NURSE ANESTHETIST, CERTIFIED REGISTERED

## 2020-12-21 PROCEDURE — 7100000010 HC PHASE II RECOVERY - FIRST 15 MIN: Performed by: PODIATRIST

## 2020-12-21 PROCEDURE — C1713 ANCHOR/SCREW BN/BN,TIS/BN: HCPCS | Performed by: PODIATRIST

## 2020-12-21 PROCEDURE — 3600000003 HC SURGERY LEVEL 3 BASE: Performed by: PODIATRIST

## 2020-12-21 DEVICE — SCREW BNE L16MM DIA3MM FT ANK TI VAR ANG LOK LO PROF FOR: Type: IMPLANTABLE DEVICE | Site: FOOT | Status: FUNCTIONAL

## 2020-12-21 DEVICE — SCREW BNE L18MM DIA3MM FT ANK TI VAR ANG LCK LO PROF: Type: IMPLANTABLE DEVICE | Site: FOOT | Status: FUNCTIONAL

## 2020-12-21 RX ORDER — SODIUM CHLORIDE 9 MG/ML
INJECTION, SOLUTION INTRAVENOUS CONTINUOUS PRN
Status: DISCONTINUED | OUTPATIENT
Start: 2020-12-21 | End: 2020-12-21 | Stop reason: SDUPTHER

## 2020-12-21 RX ORDER — BUPIVACAINE HYDROCHLORIDE 5 MG/ML
INJECTION, SOLUTION EPIDURAL; INTRACAUDAL PRN
Status: DISCONTINUED | OUTPATIENT
Start: 2020-12-21 | End: 2020-12-21 | Stop reason: ALTCHOICE

## 2020-12-21 RX ORDER — DOXYCYCLINE HYCLATE 100 MG
100 TABLET ORAL 2 TIMES DAILY
Qty: 14 TABLET | Refills: 0 | Status: SHIPPED | OUTPATIENT
Start: 2020-12-21 | End: 2020-12-28

## 2020-12-21 RX ORDER — LIDOCAINE HYDROCHLORIDE 20 MG/ML
INJECTION, SOLUTION EPIDURAL; INFILTRATION; INTRACAUDAL; PERINEURAL PRN
Status: DISCONTINUED | OUTPATIENT
Start: 2020-12-21 | End: 2020-12-21 | Stop reason: SDUPTHER

## 2020-12-21 RX ORDER — MIDAZOLAM HYDROCHLORIDE 1 MG/ML
INJECTION INTRAMUSCULAR; INTRAVENOUS PRN
Status: DISCONTINUED | OUTPATIENT
Start: 2020-12-21 | End: 2020-12-21 | Stop reason: SDUPTHER

## 2020-12-21 RX ORDER — ONDANSETRON 2 MG/ML
INJECTION INTRAMUSCULAR; INTRAVENOUS PRN
Status: DISCONTINUED | OUTPATIENT
Start: 2020-12-21 | End: 2020-12-21 | Stop reason: SDUPTHER

## 2020-12-21 RX ORDER — SODIUM CHLORIDE 0.9 % (FLUSH) 0.9 %
10 SYRINGE (ML) INJECTION EVERY 12 HOURS SCHEDULED
Status: DISCONTINUED | OUTPATIENT
Start: 2020-12-21 | End: 2020-12-21 | Stop reason: HOSPADM

## 2020-12-21 RX ORDER — MEPERIDINE HYDROCHLORIDE 25 MG/ML
12.5 INJECTION INTRAMUSCULAR; INTRAVENOUS; SUBCUTANEOUS EVERY 5 MIN PRN
Status: DISCONTINUED | OUTPATIENT
Start: 2020-12-21 | End: 2020-12-21 | Stop reason: HOSPADM

## 2020-12-21 RX ORDER — PROPOFOL 10 MG/ML
INJECTION, EMULSION INTRAVENOUS PRN
Status: DISCONTINUED | OUTPATIENT
Start: 2020-12-21 | End: 2020-12-21 | Stop reason: SDUPTHER

## 2020-12-21 RX ORDER — DIPHENHYDRAMINE HYDROCHLORIDE 50 MG/ML
12.5 INJECTION INTRAMUSCULAR; INTRAVENOUS
Status: DISCONTINUED | OUTPATIENT
Start: 2020-12-21 | End: 2020-12-21 | Stop reason: HOSPADM

## 2020-12-21 RX ORDER — OXYCODONE HYDROCHLORIDE AND ACETAMINOPHEN 5; 325 MG/1; MG/1
1 TABLET ORAL PRN
Status: COMPLETED | OUTPATIENT
Start: 2020-12-21 | End: 2020-12-21

## 2020-12-21 RX ORDER — FENTANYL CITRATE 50 UG/ML
INJECTION, SOLUTION INTRAMUSCULAR; INTRAVENOUS PRN
Status: DISCONTINUED | OUTPATIENT
Start: 2020-12-21 | End: 2020-12-21 | Stop reason: SDUPTHER

## 2020-12-21 RX ORDER — KETOROLAC TROMETHAMINE 30 MG/ML
INJECTION, SOLUTION INTRAMUSCULAR; INTRAVENOUS PRN
Status: DISCONTINUED | OUTPATIENT
Start: 2020-12-21 | End: 2020-12-21 | Stop reason: SDUPTHER

## 2020-12-21 RX ORDER — SODIUM CHLORIDE 0.9 % (FLUSH) 0.9 %
10 SYRINGE (ML) INJECTION PRN
Status: CANCELLED | OUTPATIENT
Start: 2020-12-21

## 2020-12-21 RX ORDER — HYDROCODONE BITATRATE AND ACETAMINOPHEN 5; 325 MG/1; MG/1
1 TABLET ORAL EVERY 6 HOURS PRN
Qty: 15 TABLET | Refills: 0 | Status: SHIPPED | OUTPATIENT
Start: 2020-12-21 | End: 2020-12-24

## 2020-12-21 RX ORDER — SODIUM CHLORIDE 0.9 % (FLUSH) 0.9 %
10 SYRINGE (ML) INJECTION EVERY 12 HOURS SCHEDULED
Status: CANCELLED | OUTPATIENT
Start: 2020-12-21

## 2020-12-21 RX ORDER — EPHEDRINE SULFATE/0.9% NACL/PF 50 MG/5 ML
SYRINGE (ML) INTRAVENOUS PRN
Status: DISCONTINUED | OUTPATIENT
Start: 2020-12-21 | End: 2020-12-21 | Stop reason: SDUPTHER

## 2020-12-21 RX ORDER — SODIUM CHLORIDE 0.9 % (FLUSH) 0.9 %
10 SYRINGE (ML) INJECTION PRN
Status: DISCONTINUED | OUTPATIENT
Start: 2020-12-21 | End: 2020-12-21 | Stop reason: HOSPADM

## 2020-12-21 RX ORDER — OXYCODONE HYDROCHLORIDE AND ACETAMINOPHEN 5; 325 MG/1; MG/1
2 TABLET ORAL PRN
Status: COMPLETED | OUTPATIENT
Start: 2020-12-21 | End: 2020-12-21

## 2020-12-21 RX ORDER — ASPIRIN 325 MG
325 TABLET ORAL 2 TIMES DAILY
Qty: 28 TABLET | Refills: 0 | Status: SHIPPED | OUTPATIENT
Start: 2020-12-21 | End: 2021-03-19

## 2020-12-21 RX ADMIN — Medication 2 G: at 08:52

## 2020-12-21 RX ADMIN — SODIUM CHLORIDE: 9 INJECTION, SOLUTION INTRAVENOUS at 09:26

## 2020-12-21 RX ADMIN — FENTANYL CITRATE 100 MCG: 50 INJECTION, SOLUTION INTRAMUSCULAR; INTRAVENOUS at 08:58

## 2020-12-21 RX ADMIN — LIDOCAINE HYDROCHLORIDE 100 MG: 20 INJECTION, SOLUTION EPIDURAL; INFILTRATION; INTRACAUDAL; PERINEURAL at 08:58

## 2020-12-21 RX ADMIN — SODIUM CHLORIDE: 9 INJECTION, SOLUTION INTRAVENOUS at 11:10

## 2020-12-21 RX ADMIN — FENTANYL CITRATE 50 MCG: 50 INJECTION, SOLUTION INTRAMUSCULAR; INTRAVENOUS at 10:10

## 2020-12-21 RX ADMIN — ONDANSETRON 4 MG: 2 INJECTION INTRAMUSCULAR; INTRAVENOUS at 10:31

## 2020-12-21 RX ADMIN — HYDROMORPHONE HYDROCHLORIDE 0.5 MG: 1 INJECTION, SOLUTION INTRAMUSCULAR; INTRAVENOUS; SUBCUTANEOUS at 11:48

## 2020-12-21 RX ADMIN — OXYCODONE AND ACETAMINOPHEN 2 TABLET: 5; 325 TABLET ORAL at 12:17

## 2020-12-21 RX ADMIN — FENTANYL CITRATE 50 MCG: 50 INJECTION, SOLUTION INTRAMUSCULAR; INTRAVENOUS at 10:59

## 2020-12-21 RX ADMIN — Medication 10 MG: at 09:31

## 2020-12-21 RX ADMIN — HYDROMORPHONE HYDROCHLORIDE 0.5 MG: 1 INJECTION, SOLUTION INTRAMUSCULAR; INTRAVENOUS; SUBCUTANEOUS at 11:27

## 2020-12-21 RX ADMIN — KETOROLAC TROMETHAMINE 30 MG: 30 INJECTION, SOLUTION INTRAMUSCULAR; INTRAVENOUS at 10:33

## 2020-12-21 RX ADMIN — PROPOFOL 200 MG: 10 INJECTION, EMULSION INTRAVENOUS at 08:58

## 2020-12-21 RX ADMIN — MIDAZOLAM 2 MG: 1 INJECTION INTRAMUSCULAR; INTRAVENOUS at 08:52

## 2020-12-21 RX ADMIN — HYDROMORPHONE HYDROCHLORIDE 0.5 MG: 1 INJECTION, SOLUTION INTRAMUSCULAR; INTRAVENOUS; SUBCUTANEOUS at 11:40

## 2020-12-21 RX ADMIN — SODIUM CHLORIDE: 9 INJECTION, SOLUTION INTRAVENOUS at 08:52

## 2020-12-21 ASSESSMENT — PULMONARY FUNCTION TESTS
PIF_VALUE: 16
PIF_VALUE: 17
PIF_VALUE: 16
PIF_VALUE: 16
PIF_VALUE: 1
PIF_VALUE: 16
PIF_VALUE: 0
PIF_VALUE: 16
PIF_VALUE: 1
PIF_VALUE: 15
PIF_VALUE: 16
PIF_VALUE: 16
PIF_VALUE: 3
PIF_VALUE: 16
PIF_VALUE: 16
PIF_VALUE: 2
PIF_VALUE: 1
PIF_VALUE: 16
PIF_VALUE: 0
PIF_VALUE: 15
PIF_VALUE: 16
PIF_VALUE: 16
PIF_VALUE: 15
PIF_VALUE: 16
PIF_VALUE: 1
PIF_VALUE: 16
PIF_VALUE: 4
PIF_VALUE: 16
PIF_VALUE: 16
PIF_VALUE: 15
PIF_VALUE: 16
PIF_VALUE: 17
PIF_VALUE: 15
PIF_VALUE: 16
PIF_VALUE: 15
PIF_VALUE: 16
PIF_VALUE: 1
PIF_VALUE: 16
PIF_VALUE: 16
PIF_VALUE: 3
PIF_VALUE: 16
PIF_VALUE: 15
PIF_VALUE: 16
PIF_VALUE: 3
PIF_VALUE: 2
PIF_VALUE: 15
PIF_VALUE: 16
PIF_VALUE: 17
PIF_VALUE: 16
PIF_VALUE: 23
PIF_VALUE: 15
PIF_VALUE: 3
PIF_VALUE: 16
PIF_VALUE: 15
PIF_VALUE: 6
PIF_VALUE: 15
PIF_VALUE: 16
PIF_VALUE: 2
PIF_VALUE: 16
PIF_VALUE: 1
PIF_VALUE: 16
PIF_VALUE: 26
PIF_VALUE: 16
PIF_VALUE: 15
PIF_VALUE: 16

## 2020-12-21 ASSESSMENT — PAIN DESCRIPTION - ONSET
ONSET: ON-GOING
ONSET: ON-GOING

## 2020-12-21 ASSESSMENT — PAIN DESCRIPTION - PAIN TYPE
TYPE: SURGICAL PAIN
TYPE: SURGICAL PAIN
TYPE: ACUTE PAIN
TYPE: ACUTE PAIN;SURGICAL PAIN

## 2020-12-21 ASSESSMENT — PAIN SCALES - GENERAL
PAINLEVEL_OUTOF10: 7
PAINLEVEL_OUTOF10: 7
PAINLEVEL_OUTOF10: 8
PAINLEVEL_OUTOF10: 0
PAINLEVEL_OUTOF10: 6
PAINLEVEL_OUTOF10: 7

## 2020-12-21 ASSESSMENT — PAIN DESCRIPTION - DESCRIPTORS
DESCRIPTORS: THROBBING

## 2020-12-21 ASSESSMENT — PAIN DESCRIPTION - LOCATION
LOCATION: FOOT

## 2020-12-21 ASSESSMENT — PAIN DESCRIPTION - ORIENTATION
ORIENTATION: RIGHT

## 2020-12-21 ASSESSMENT — PAIN - FUNCTIONAL ASSESSMENT: PAIN_FUNCTIONAL_ASSESSMENT: 0-10

## 2020-12-21 NOTE — OP NOTE
Operative Note      Patient: Alejandra Perea Covert  YOB: 1985  MRN: [id number]    Date of Procedure: 12/21/2020    Pre-Op Diagnosis: PAINFUL FIRST TARSAL METATARSAL ARTHRITIS RIGHT SHORT ACHILLES TENDON RIGHT    Post-Op Diagnosis: Same       Procedure(s):  FUSION FIRST TARSOMETATARSAL JOINT RIGHT TENDO ACHILLES TENDON LENGTHENING RIGHT    Surgeon(s):  Umu Hook DPM    Assistant:   Anirudh Welsh DPM PGY-2      Anesthesia: General    Estimated Blood Loss (mL): less than 5cc    Complications: None    Specimens:   ID Type Source Tests Collected by Time Destination   A : Right Cuneiform Bone Bone SURGICAL PATHOLOGY Umu Hook DPM 12/21/2020 0932        Implants:  Implant Name Type Inv. Item Serial No.  Lot No. LRB No. Used Action   ARTHREX 3.0 X 18MM CORTICAL SCREW Screw/Plate/Nail/Chuckie   [employer]PMM  Right 1 Implanted   ARTHREX 3.0 X 20MM CORTICAL SCREW Screw/Plate/Nail/Chuckie   [employer]PMM  Right 2 Implanted   SCREW BONE L18MM DIA3MM FT ANK TI REN ANG LCK LO PROF FOR Screw/Plate/Nail/Chuckie SCREW BONE L18MM DIA3MM FT ANK TI REN ANG LCK LO PROF FOR  [employer]WD  Right 1 Implanted   SCREW BNE L16MM DIA3MM FT ANK TI REN ANG JASIEL LO PROF FOR Screw/Plate/Nail/Chuckie SCREW BNE L16MM DIA3MM FT ANK TI REN ANG JASIEL LO PROF FOR  [employer]WD  Right 1 Implanted   ARTHREX PLATE MTP MAX Screw/Plate/Nail/Chuckie   [employer]-PMM  Right 1 Implanted         Drains: * No LDAs found *        Indication for procedure  Patient was seen in the office and MRI as well as radiographs were reviewed. Majority of patient's pain at the first tarsometatarsal joint and under back and Achilles tendon. We did discuss fusion of the first tarsometatarsal joint and lengthening of Achilles tendon for surgical correction. She has tried and failed multiple conservative treatment options including but not limited to shoe gear modification, custom bracing, orthotics and cortisone injections.   Understanding risk of surgery including but not limited to nonunion, hardware pain causing subsequent hardware removal surgery, DVT, loss of limb loss of life. Patient understand and wished to proceed with surgical intervention. Detailed Description of Procedure: Following satisfactory preoperative evaluation the patient was brought back in the OR and placed on the OR table in a supine position. General sedation ministered by anesthesia team.  Following sedation well-padded pneumatic tourniquet applied right thigh. Foot and lower leg prepped and draped in usual sterile and aseptic manner foot was then loaded on the surgical field. Esmarch was used and tourniquet inflated 250 mmHg. Attention directed posterior along the Achilles tendon where 3 separate stab incisions were made to medial and 1 lateral 2 cm proximal from the insertion of the Achilles tendon and each incision approximately 3 cm apart from each other. #11 blade was used to perform a [place] triple stab tendo Achilles lengthening. 3-0 nylon used to close skin. Attention directed overlying first tarsometatarsal joint where C-arm fluoroscopy used to confirm positioning of the joint with AP MO and lateral radiograph. Incision was made approximately 5 cm in length with a #15 blade deep through skin and subcutaneous tissue. Sharp and blunt dissection was used at this time. Cautery was used. Care was taken to resect and reflect extensor tendon. Care taken to avoid  tibialis anterior tendon. Neurovascular structures were protected throughout entire procedure. Incision and deepened to expose the first tarsometatarsal joint. [place] elevator was used to confirm the positioning of the first tarsometatarsal joint on AP MO and lateral radiographs. Joint prep was used in standard technique with hand curette, hand osteotome and K wire was 0.062 K wire after cartilage was removed.   I did use a lamina  and then did once again debride the cartilage to expose bleeding and prep the joint. Dorsal spurring was resected at the dorsal aspect of the medial cuneiform with a hand rongeur. Bone was passed the back table for pathology. Incision flushed with copious amounts sterile saline. Standard plating technique was used with Arthrex MaxForce compression plate. I did use fluoroscopy to confirm plate positioning. Temporary stabilization with BB tacks was used. I first did place 3 separate screws proximal aspect overlying the medial cuneiform. 3.0 mm x 18 and by 20 mm nonlocking screws were used and 3.0 mm x 18 and 16 mm respectively were used locking. Did use a locking screw distally. MaxForce complexion plate was used in standard technique for distal screw technique. Final C-arm fluoroscopy images were obtained with AP MO and lateral radiographs. Tourniquet deflated total tourniquet time 79 minutes. Deep tissue closed with 2-0 Vicryl. Subcutaneous tissue closed with 3-0 Vicryl. Skin closed with horizontal mattress technique with 3-0 nylon. 10 cc 0.5% Marcaine plain used proximal field injection. Bandage applied consisting of the following. Arthrex jumpstart bandage, 4 x 4 padding, true Pearce bandage with cast padding and Ace bandage. Posterior splint was used with foot at 90 degree position. Patient transported covering vital signs stable vascular status intact right foot. Orders written as follows  Keep bandage clean dry intact right foot. Norco 5/325 every 6 hours as needed pain. 15 total tablets. 0 refills. Aspirin 325 twice daily. Doxycycline 1 mg twice daily next 7 days. Dispense crutches. Strict nonweightbearing. Okay to be discharged from foot and ankle standpoint. Okay to be discharged once stable from anesthesia. Follow-up in office 1 week.                             Electronically signed by Joss Harding DPM on 12/21/2020 at 10:49 AM

## 2020-12-21 NOTE — ANESTHESIA PRE PROCEDURE
Department of Anesthesiology  Preprocedure Note       Name:  Christine Brandt Covert   Age:  28 y.o.  :  1985                                          MRN:  [id number]         Date:  2020      Surgeon: Jacklyn Forte):  Donal Peres DPM    Procedure: FUSION FIRST TARSOMETATARSAL JOINT RIGHT TENDO ACHILLES TENDON LENGTHENING RIGHT      +++ARTHREX+++ (Right )    Medications prior to admission:   Prior to Admission medications    Medication Sig Start Date End Date Taking? Authorizing Provider   vitamin D (ERGOCALCIFEROL) 1.25 MG (62495 UT) CAPS capsule Take 1 capsule by mouth once a week 10/2/20  Yes Zenobia Brown MD   zinc 50 MG CAPS Take 50 mg by mouth See Admin Instructions Take Zinc Gluconate 50 mgs 1 tablet every Monday, Wednesday and Friday 10/2/20  Yes Zenobia Brown MD   ondansetron (ZOFRAN ODT) 4 MG disintegrating tablet Take 1 tablet by mouth every 8 hours as needed for Nausea or Vomiting 20  Yes Zenobia Brown MD   Ferrous Sulfate (IRON PO) Take 1 tablet by mouth daily   Yes Historical Provider, MD   Calcium Carbonate (CALCI-CHEW PO) Take 3 tablets by mouth daily   Yes Historical Provider, MD   Elagolix Sodium (ORILISSA) 200 MG TABS Take 1 tablet by mouth 2 times daily 20  Yes Nirmala Cotton MD   diclofenac sodium (VOLTAREN) 1 % GEL Apply 2 g topically 4 times daily 20  Yes Merary Bastrop, DO   SUMAtriptan (IMITREX) 50 MG tablet TAKE 1/2 TABLET BY MOUTH ONCE FOR ONE DOSE AS NEEDED FOR MIGRAINE 20  Yes Merary Bastrop, DO   propranolol (INDERAL) 10 MG tablet Take one 3 times daily, may increase to 2 twice daily in 2 wks. , migraine  Patient taking differently: 20 mg 2 times daily Take one 3 times daily, may increase to 2 twice daily in 2 wks. , migraine 20  Yes Saniya Gomez MD Multiple Vitamins-Minerals (MULTIVITAMIN ADULT PO) Take 1 tablet by mouth daily Indications: Bariatric Advantage Instructed to hold 5 days pre-op   Yes Historical Provider, MD   omeprazole (PRILOSEC) 20 MG delayed release capsule Take 20 mg by mouth Daily Instructed to take morning of surgery with a sip of water 2/15/17  Yes Historical Provider, MD   vitamin D (ERGOCALCIFEROL) 1.25 MG (77648 UT) CAPS capsule Take 1 capsule by mouth Twice a Week 4/13/20   Shell Buitrago MD       Current medications:    Current Facility-Administered Medications   Medication Dose Route Frequency Provider Last Rate Last Admin    sodium chloride flush 0.9 % injection 10 mL  10 mL Intravenous 2 times per day Suyapa Galdamez DPM        sodium chloride flush 0.9 % injection 10 mL  10 mL Intravenous PRN Suyapa Galdamez DPM        ceFAZolin (ANCEF) 2 g in sterile water 20 mL IV syringe  2 g Intravenous On Call to SHAKILA Arnold        meperidine (DEMEROL) injection 12.5 mg  12.5 mg Intravenous Q5 Min PRN Frank Tucker MD        diphenhydrAMINE (BENADRYL) injection 12.5 mg  12.5 mg Intravenous Once PRN Frank Tucker MD        oxyCODONE-acetaminophen (PERCOCET) 5-325 MG per tablet 1 tablet  1 tablet Oral PRN Frank Tucker MD        Or    oxyCODONE-acetaminophen (PERCOCET) 5-325 MG per tablet 2 tablet  2 tablet Oral PRN Frank Tucker MD        HYDROmorphone (DILAUDID) injection 0.5 mg  0.5 mg Intravenous Q5 Min PRN Frank Tucker MD        HYDROmorphone (DILAUDID) injection 0.25 mg  0.25 mg Intravenous Q5 Min PRN Frank Tucker MD           Allergies:     Allergies   Allergen Reactions    Ciprofloxacin Anaphylaxis       Problem List:    Patient Active Problem List   Diagnosis Code    Recurrent acute suppurative otitis media of right ear without spontaneous rupture of tympanic membrane H66.004    Acute otitis externa of right ear H60.501    Migraine headache G43.317 Counseling given: Not Answered  Comment: quit smoking 01/09/2015      Vital Signs (Current):   Vitals:    12/17/20 1618 12/21/20 0734   BP:  (!) 92/51   Pulse:  59   Resp:  20   Temp:  96.9 °F (36.1 °C)   TempSrc:  Temporal   SpO2:  96%   Weight: 239 lb (108.4 kg) 239 lb (108.4 kg)   Height: 5' 7\" (1.702 m) 5' 7\" (1.702 m)                                              BP Readings from Last 3 Encounters:   12/21/20 (!) 92/51   10/26/20 120/70   10/02/20 (!) 122/59       NPO Status: Time of last liquid consumption: 2230                                                                               BMI:   Wt Readings from Last 3 Encounters:   12/21/20 239 lb (108.4 kg)   11/02/20 251 lb (113.9 kg)   10/26/20 251 lb (113.9 kg)     Body mass index is 37.43 kg/m². CBC:   Lab Results   Component Value Date    WBC 6.7 09/28/2020    RBC 4.71 09/28/2020    HGB 13.3 09/28/2020    HCT 42.6 09/28/2020    MCV 90.4 09/28/2020    RDW 15.0 09/28/2020     09/28/2020       CMP:   Lab Results   Component Value Date     09/28/2020    K 3.9 09/28/2020    K 4.4 07/01/2020     09/28/2020    CO2 21 09/28/2020    BUN 14 09/28/2020    CREATININE 0.7 09/28/2020    GFRAA >60 09/28/2020    LABGLOM >60 09/28/2020    GLUCOSE 80 09/28/2020    PROT 7.1 09/28/2020    CALCIUM 9.3 09/28/2020    BILITOT 0.4 09/28/2020    ALKPHOS 65 09/28/2020    AST 20 09/28/2020    ALT 28 09/28/2020       POC Tests: No results for input(s): POCGLU, POCNA, POCK, POCCL, POCBUN, POCHEMO, POCHCT in the last 72 hours.     Coags: No results found for: PROTIME, INR, APTT    HCG (If Applicable):   Lab Results   Component Value Date    PREGTESTUR NEGATIVE 06/30/2020        ABGs: No results found for: PHART, PO2ART, VAA7CMJ, ZWF7GOL, BEART, W3UFBJSR     Type & Screen (If Applicable):  No results found for: [facility]    Anesthesia Evaluation  Patient summary reviewed no history of anesthetic complications: Airway: Mallampati: II  TM distance: >3 FB   Neck ROM: full  Mouth opening: > = 3 FB Dental: normal exam         Pulmonary: breath sounds clear to auscultation                            ROS comment: Former smoker   Cardiovascular:Negative CV ROS          ECG reviewed  Rhythm: regular  Rate: normal           Beta Blocker:  Not on Beta Blocker         Neuro/Psych:   (+) headaches: migraine headaches, psychiatric history: stable with treatmentdepression/anxiety              ROS comment: RLS (restless legs syndrome) GI/Hepatic/Renal: Neg GI/Hepatic/Renal ROS            Endo/Other:                      ROS comment: Obese  PCOS Abdominal:           Vascular: negative vascular ROS. Anesthesia Plan      general     ASA 2     (#4 LMA)  Induction: intravenous. MIPS: Postoperative opioids intended and Prophylactic antiemetics administered. Anesthetic plan and risks discussed with patient. Plan discussed with CRNA. Abisai Holman MD   12/21/2020    DOS STAFF ADDENDUM:    Pt seen and examined, physical exam updated, chart reviewed including anesthesia, drug and allergy history. H&P reviewed. No interval changes to history or physical examination (unless noted above). NPO status confirmed. Anesthetic plan, risks, benefits, alternatives discussed with patient. Patient verbalized an understanding and agrees to proceed.      Abisai Holman MD  Staff Anesthesiologist  7:53 AM

## 2020-12-23 ENCOUNTER — OFFICE VISIT (OUTPATIENT)
Dept: PODIATRY | Age: 35
End: 2020-12-23

## 2020-12-23 PROCEDURE — 99024 POSTOP FOLLOW-UP VISIT: CPT | Performed by: PODIATRIST

## 2020-12-23 RX ORDER — HYDROCODONE BITARTRATE AND ACETAMINOPHEN 10; 325 MG/1; MG/1
1 TABLET ORAL EVERY 6 HOURS PRN
Qty: 15 TABLET | Refills: 0 | Status: SHIPPED | OUTPATIENT
Start: 2020-12-23 | End: 2020-12-23

## 2020-12-23 RX ORDER — HYDROCODONE BITARTRATE AND ACETAMINOPHEN 10; 325 MG/1; MG/1
1 TABLET ORAL EVERY 6 HOURS PRN
Qty: 15 TABLET | Refills: 0 | Status: SHIPPED | OUTPATIENT
Start: 2020-12-23 | End: 2020-12-28

## 2020-12-23 NOTE — PROGRESS NOTES
  zinc 50 MG CAPS, Take 50 mg by mouth See Admin Instructions Take Zinc Gluconate 50 mgs 1 tablet every Monday, Wednesday and Friday, Disp: 30 capsule, Rfl: 0    ondansetron (ZOFRAN ODT) 4 MG disintegrating tablet, Take 1 tablet by mouth every 8 hours as needed for Nausea or Vomiting, Disp: 15 tablet, Rfl: 0    Ferrous Sulfate (IRON PO), Take 1 tablet by mouth daily, Disp: , Rfl:     Calcium Carbonate (CALCI-CHEW PO), Take 3 tablets by mouth daily, Disp: , Rfl:     Elagolix Sodium (ORILISSA) 200 MG TABS, Take 1 tablet by mouth 2 times daily, Disp: 60 tablet, Rfl: 3    diclofenac sodium (VOLTAREN) 1 % GEL, Apply 2 g topically 4 times daily, Disp: 2 Tube, Rfl: 1    vitamin D (ERGOCALCIFEROL) 1.25 MG (34720 UT) CAPS capsule, Take 1 capsule by mouth Twice a Week, Disp: 24 capsule, Rfl: 1    SUMAtriptan (IMITREX) 50 MG tablet, TAKE 1/2 TABLET BY MOUTH ONCE FOR ONE DOSE AS NEEDED FOR MIGRAINE, Disp: 9 tablet, Rfl: 0    propranolol (INDERAL) 10 MG tablet, Take one 3 times daily, may increase to 2 twice daily in 2 wks. , migraine (Patient taking differently: 20 mg 2 times daily Take one 3 times daily, may increase to 2 twice daily in 2 wks. , migraine), Disp: 186 tablet, Rfl: 5    Multiple Vitamins-Minerals (MULTIVITAMIN ADULT PO), Take 1 tablet by mouth daily Indications: Bariatric Advantage Instructed to hold 5 days pre-op, Disp: , Rfl:     omeprazole (PRILOSEC) 20 MG delayed release capsule, Take 20 mg by mouth Daily Instructed to take morning of surgery with a sip of water, Disp: , Rfl:   Allergies   Allergen Reactions    Ciprofloxacin Anaphylaxis       Past Medical History:   Diagnosis Date    Allergic rhinitis     history    Anesthesia complication     pulse ox dropped    Arthritis     Chronic sinusitis     Chronic tension-type headache, intractable 1/24/2020    Constipation     Depression with anxiety     Folic acid deficiency 2/67/4693    Migraine headache 1/24/2020    Morbidly obese ([facility], [place] [address]  PCOS (polycystic ovarian syndrome) 1/24/2020    Prolonged emergence from general anesthesia     RLS (restless legs syndrome)     Vitamin D deficiency 1/24/2020           There were no vitals filed for this visit. Work History/Social History: [employer]  Orthopedic history: MRI right ankle July 2020 rupture right posterior tibial tendon    Focused Lower Extremity Physical Exam:    Neurovascular examination:    Dorsalis Pedis palpable bilateral.  Posterior tibialis palpable bilateral.    Capillary Refill Time:  Immediate return  Hair growth:  Symmetrical and bilateral   Skin:  Not atrophic  Edema: No edema bilateral feet or ankles. Neurologic:  Light touch intact bilateral.      Musculoskeletal/ Orthopedic examination:    Wiggling toes  Negative Homans    Dermatology examination:    Posterior splint right lower leg bandage clean dry intact. No strikethrough. Immediate capillary refill time all digits  Sutures intact dorsal incision. No erythema no drainage. Assessment and Plan:  Manny Zamora was seen today for post-op check. Diagnoses and all orders for this visit:    Pain in right foot  -     Discontinue: HYDROcodone-acetaminophen (NORCO)  MG per tablet; Take 1 tablet by mouth every 6 hours as needed for Pain for up to 5 days. Intended supply: 30 days  -     HYDROcodone-acetaminophen (NORCO)  MG per tablet; Take 1 tablet by mouth every 6 hours as needed for Pain for up to 5 days. Intended supply: 30 days    Arthritis, midfoot    Short Achilles tendon (acquired), right ankle          Postop day of surgery 12/21/2020 fusion first tarsometatarsal joint right foot and tendo Achilles lengthening right lower leg    Sutures intact dorsal incision. Pain has improved since yesterday but did almost fall yesterday with crutches. Importance of strict nonweightbearing right lower leg with knee scooter. Keep bandage clean dry intact. Any increase calf pain go emergency room. I did recommend continued use of aspirin 325 twice daily. We did run an [id number] [facility] report today on Patricia Claudio. [place] 10/325 mg every 6 hours as needed pain. 15 total tablets 0 refills. I will follow-up 1 week. Return in about 1 week (around 12/30/2020). Seen By:  Clarence Vines DPM      Document was created using voice recognition software. Note was reviewed, however may contain grammatical errors.

## 2020-12-29 ENCOUNTER — OFFICE VISIT (OUTPATIENT)
Dept: PODIATRY | Age: 35
End: 2020-12-29

## 2020-12-29 PROCEDURE — 99024 POSTOP FOLLOW-UP VISIT: CPT | Performed by: PODIATRIST

## 2020-12-29 NOTE — PROGRESS NOTES
Patient in office for post op visit two. Postop day of surgery 2020 fusion first tarsometatarsal joint right foot and tendo Achilles lengthening right lower leg. Edilia Ryan Covert : 1985 Sex: female  Age: 28 y.o. Patient was referred by Vivek Duque DO    CC:    Postop day of surgery 2020 fusion first tarsometatarsal joint right foot and tendo Achilles lengthening right lower leg      HPI:   Postop day of surgery 2020 fusion first tarsometatarsal joint right foot and tendo Achilles lengthening right lower leg    Does continue aspirin 325 twice daily. Denies any pain in the daytime has been using a knee scooter. Denies any new injuries. Presents today with her . States at nighttime she has some pain in the right foot but has noticed improvement.       ROS:  Const: Denies constitutional symptoms  Musculo: Denies symptoms other than stated above  Skin: Denies symptoms other than stated above       Current Outpatient Medications:     methylPREDNISolone (MEDROL DOSEPACK) 4 MG tablet, Take 1 kat by mouth as directed, Disp: 1 kit, Rfl: 0    Elagolix Sodium (ORILISSA) 200 MG TABS, Take 1 tablet by mouth 2 times daily, Disp: 60 tablet, Rfl: 0    aspirin 325 MG tablet, Take 1 tablet by mouth 2 times daily for 14 days Take by mouth twice daily, Disp: 28 tablet, Rfl: 0    vitamin D (ERGOCALCIFEROL) 1.25 MG (85984 UT) CAPS capsule, Take 1 capsule by mouth once a week, Disp: 12 capsule, Rfl: 0    zinc 50 MG CAPS, Take 50 mg by mouth See Admin Instructions Take Zinc Gluconate 50 mgs 1 tablet every Monday, Wednesday and Friday, Disp: 30 capsule, Rfl: 0    ondansetron (ZOFRAN ODT) 4 MG disintegrating tablet, Take 1 tablet by mouth every 8 hours as needed for Nausea or Vomiting, Disp: 15 tablet, Rfl: 0    Ferrous Sulfate (IRON PO), Take 1 tablet by mouth daily, Disp: , Rfl:     Calcium Carbonate (CALCI-CHEW PO), Take 3 tablets by mouth daily, Disp: , Rfl:   Elagolix Sodium (ORILISSA) 200 MG TABS, Take 1 tablet by mouth 2 times daily, Disp: 60 tablet, Rfl: 3    diclofenac sodium (VOLTAREN) 1 % GEL, Apply 2 g topically 4 times daily, Disp: 2 Tube, Rfl: 1    vitamin D (ERGOCALCIFEROL) 1.25 MG (43524 UT) CAPS capsule, Take 1 capsule by mouth Twice a Week, Disp: 24 capsule, Rfl: 1    SUMAtriptan (IMITREX) 50 MG tablet, TAKE 1/2 TABLET BY MOUTH ONCE FOR ONE DOSE AS NEEDED FOR MIGRAINE, Disp: 9 tablet, Rfl: 0    propranolol (INDERAL) 10 MG tablet, Take one 3 times daily, may increase to 2 twice daily in 2 wks. , migraine (Patient taking differently: 20 mg 2 times daily Take one 3 times daily, may increase to 2 twice daily in 2 wks. , migraine), Disp: 186 tablet, Rfl: 5    Multiple Vitamins-Minerals (MULTIVITAMIN ADULT PO), Take 1 tablet by mouth daily Indications: Bariatric Advantage Instructed to hold 5 days pre-op, Disp: , Rfl:     omeprazole (PRILOSEC) 20 MG delayed release capsule, Take 20 mg by mouth Daily Instructed to take morning of surgery with a sip of water, Disp: , Rfl:   Allergies   Allergen Reactions    Ciprofloxacin Anaphylaxis       Past Medical History:   Diagnosis Date    Allergic rhinitis     history    Anesthesia complication     pulse ox dropped    Arthritis     Chronic sinusitis     Chronic tension-type headache, intractable 1/24/2020    Constipation     Depression with anxiety     Folic acid deficiency 9/44/1318    Migraine headache 1/24/2020    Morbidly obese (HCC)     PCOS (polycystic ovarian syndrome) 1/24/2020    Prolonged emergence from general anesthesia     RLS (restless legs syndrome)     Vitamin D deficiency 1/24/2020           There were no vitals filed for this visit.     Work History/Social History: [employer]  Orthopedic history: MRI right ankle July 2020 rupture right posterior tibial tendon    Focused Lower Extremity Physical Exam:    Neurovascular examination: Dorsalis Pedis palpable bilateral.  Posterior tibialis palpable bilateral.    Capillary Refill Time:  Immediate return  Hair growth:  Symmetrical and bilateral   Skin:  Not atrophic  Edema: No edema bilateral feet or ankles. Neurologic:  Light touch intact bilateral.      Musculoskeletal/ Orthopedic examination:    Wiggling toes  Negative Homans  No pain to palpation right foot. Dermatology examination:    Posterior splint right lower leg bandage clean dry intact. No strikethrough. Immediate capillary refill time all digits  Sutures intact dorsal incision. No erythema no drainage. Assessment and Plan:  Anaid Fisher was seen today for post-op check. Diagnoses and all orders for this visit:    Pain in right foot    Arthritis, midfoot    Short Achilles tendon (acquired), right ankle    Rupture of right posterior tibialis tendon, subsequent encounter    Other orders  -     methylPREDNISolone (MEDROL DOSEPACK) 4 MG tablet; Take 1 kat by mouth as directed          Postop day of surgery 12/21/2020 fusion first tarsometatarsal joint right foot and tendo Achilles lengthening right lower leg    I did change bandage today. Patient has been pain-free during the day states he has some tenderness at the end of the day but states she is improving every day. Does present with her  through entire visit. Continue strict nonweightbearing right foot with knee scooter. Did change bandage with Adaptic, 4 x 4, Siva Kerlix Ace bandage. Keep bandage clean dry and intact. I will leave sutures and likely 1-2 additional weeks. I would obtain radiographs repeat in 2 weeks. Any increase calf pain go to emergency room. I did run [facility] report on patient. I did recommend oral Medrol Dosepak. I will follow-up 1 week        Return in about 1 week (around 1/5/2021).       Seen By:  Isabelle Jama DPM Document was created using voice recognition software. Note was reviewed, however may contain grammatical errors.

## 2020-12-31 RX ORDER — METHYLPREDNISOLONE 4 MG/1
TABLET ORAL
Qty: 1 KIT | Refills: 0 | Status: SHIPPED
Start: 2020-12-31 | End: 2021-03-19

## 2021-01-06 ENCOUNTER — TELEPHONE (OUTPATIENT)
Dept: BARIATRICS/WEIGHT MGMT | Age: 36
End: 2021-01-06

## 2021-01-07 ENCOUNTER — OFFICE VISIT (OUTPATIENT)
Dept: PODIATRY | Age: 36
End: 2021-01-07

## 2021-01-07 DIAGNOSIS — M19.079 ARTHRITIS, MIDFOOT: ICD-10-CM

## 2021-01-07 DIAGNOSIS — M67.01 SHORT ACHILLES TENDON (ACQUIRED), RIGHT ANKLE: Primary | ICD-10-CM

## 2021-01-07 PROCEDURE — 99024 POSTOP FOLLOW-UP VISIT: CPT | Performed by: PODIATRIST

## 2021-01-07 NOTE — PROGRESS NOTES
Patient in office for post op visit 3. Postop day of surgery 2020 fusion first tarsometatarsal joint right foot and tendo Achilles lengthening right lower leg. Kaia Solis Covert : 1985 Sex: female  Age: 28 y.o. Patient was referred by Francine Gardner DO    CC:    Postop day of surgery 2020 fusion first tarsometatarsal joint right foot and tendo Achilles lengthening right lower leg      HPI:   Postop day of surgery 2020 fusion first tarsometatarsal joint right foot and tendo Achilles lengthening right lower leg    Continues aspirin 325 twice daily. No calf pain. No foot pain. Has been nonweightbearing right foot. Presents today with her . Pleased with overall progression. No additional pedal complaints.       ROS:  Const: Denies constitutional symptoms  Musculo: Denies symptoms other than stated above  Skin: Denies symptoms other than stated above       Current Outpatient Medications:     methylPREDNISolone (MEDROL DOSEPACK) 4 MG tablet, Take 1 kat by mouth as directed, Disp: 1 kit, Rfl: 0    Elagolix Sodium (ORILISSA) 200 MG TABS, Take 1 tablet by mouth 2 times daily, Disp: 60 tablet, Rfl: 0    aspirin 325 MG tablet, Take 1 tablet by mouth 2 times daily for 14 days Take by mouth twice daily, Disp: 28 tablet, Rfl: 0    vitamin D (ERGOCALCIFEROL) 1.25 MG (00106 UT) CAPS capsule, Take 1 capsule by mouth once a week, Disp: 12 capsule, Rfl: 0    zinc 50 MG CAPS, Take 50 mg by mouth See Admin Instructions Take Zinc Gluconate 50 mgs 1 tablet every Monday, Wednesday and Friday, Disp: 30 capsule, Rfl: 0    ondansetron (ZOFRAN ODT) 4 MG disintegrating tablet, Take 1 tablet by mouth every 8 hours as needed for Nausea or Vomiting, Disp: 15 tablet, Rfl: 0    Ferrous Sulfate (IRON PO), Take 1 tablet by mouth daily, Disp: , Rfl:     Calcium Carbonate (CALCI-CHEW PO), Take 3 tablets by mouth daily, Disp: , Rfl:   Elagolix Sodium (ORILISSA) 200 MG TABS, Take 1 tablet by mouth 2 times daily, Disp: 60 tablet, Rfl: 3    diclofenac sodium (VOLTAREN) 1 % GEL, Apply 2 g topically 4 times daily, Disp: 2 Tube, Rfl: 1    vitamin D (ERGOCALCIFEROL) 1.25 MG (56948 UT) CAPS capsule, Take 1 capsule by mouth Twice a Week, Disp: 24 capsule, Rfl: 1    SUMAtriptan (IMITREX) 50 MG tablet, TAKE 1/2 TABLET BY MOUTH ONCE FOR ONE DOSE AS NEEDED FOR MIGRAINE, Disp: 9 tablet, Rfl: 0    propranolol (INDERAL) 10 MG tablet, Take one 3 times daily, may increase to 2 twice daily in 2 wks. , migraine (Patient taking differently: 20 mg 2 times daily Take one 3 times daily, may increase to 2 twice daily in 2 wks. , migraine), Disp: 186 tablet, Rfl: 5    Multiple Vitamins-Minerals (MULTIVITAMIN ADULT PO), Take 1 tablet by mouth daily Indications: Bariatric Advantage Instructed to hold 5 days pre-op, Disp: , Rfl:     omeprazole (PRILOSEC) 20 MG delayed release capsule, Take 20 mg by mouth Daily Instructed to take morning of surgery with a sip of water, Disp: , Rfl:   Allergies   Allergen Reactions    Ciprofloxacin Anaphylaxis       Past Medical History:   Diagnosis Date    Allergic rhinitis     history    Anesthesia complication     pulse ox dropped    Arthritis     Chronic sinusitis     Chronic tension-type headache, intractable 1/24/2020    Constipation     Depression with anxiety     Folic acid deficiency 7/36/5710    Migraine headache 1/24/2020    Morbidly obese (HCC)     PCOS (polycystic ovarian syndrome) 1/24/2020    Prolonged emergence from general anesthesia     RLS (restless legs syndrome)     Vitamin D deficiency 1/24/2020           There were no vitals filed for this visit.     Work History/Social History: [employer]  Orthopedic history: MRI right ankle July 2020 rupture right posterior tibial tendon    Focused Lower Extremity Physical Exam:    Neurovascular examination: Dorsalis Pedis palpable bilateral.  Posterior tibialis palpable bilateral.    Capillary Refill Time:  Immediate return  Hair growth:  Symmetrical and bilateral   Skin:  Not atrophic  Edema: No edema bilateral feet or ankles. Neurologic:  Light touch intact bilateral.      Musculoskeletal/ Orthopedic examination:    Wiggling toes  Negative Homans  No pain to palpation right foot. Dermatology examination:    Posterior splint right lower leg bandage clean dry intact. No strikethrough. Immediate capillary refill time all digits  Sutures intact dorsal incision. No erythema no drainage. Sutures removed. Skin is well coapted posterior incision as well as dorsal medial incision right foot. Assessment and Plan:  Sybil Cardenas was seen today for post-op check. Diagnoses and all orders for this visit:    Short Achilles tendon (acquired), right ankle  -     XR FOOT RIGHT (MIN 3 VIEWS); Future    Arthritis, midfoot  -     XR FOOT RIGHT (MIN 3 VIEWS); Future          Postop day of surgery 12/21/2020 fusion first tarsometatarsal joint right foot and tendo Achilles lengthening right lower leg    Sutures removed today. Skin is well coapted. I did apply Steri-Strips. Adaptic, 4 x 4, Siva Ace bandage. Keep bandage clean dry intact right foot. Continue Cam walking boot to be treated as a cast.  Strict nonweightbearing right foot. Leave walking boot on at all times. Radiographs reviewed 3 view nonweightbearing radiographs. Postsurgical changes with first tarsometatarsal joint fusion noted. Follow-up 1 week. Return in about 1 week (around 1/14/2021). Seen By:  El Frances DPM      Document was created using voice recognition software. Note was reviewed, however may contain grammatical errors.

## 2021-01-08 ENCOUNTER — VIRTUAL VISIT (OUTPATIENT)
Dept: BARIATRICS/WEIGHT MGMT | Age: 36
End: 2021-01-08
Payer: COMMERCIAL

## 2021-01-08 ENCOUNTER — TELEPHONE (OUTPATIENT)
Dept: BARIATRICS/WEIGHT MGMT | Age: 36
End: 2021-01-08

## 2021-01-08 DIAGNOSIS — Z00.8 NUTRITIONAL ASSESSMENT: ICD-10-CM

## 2021-01-08 DIAGNOSIS — Z71.3 NUTRITIONAL COUNSELING: Primary | ICD-10-CM

## 2021-01-08 DIAGNOSIS — K91.2 MALNUTRITION FOLLOWING GASTROINTESTINAL SURGERY: Primary | ICD-10-CM

## 2021-01-08 PROCEDURE — 99442 PR PHYS/QHP TELEPHONE EVALUATION 11-20 MIN: CPT | Performed by: SURGERY

## 2021-01-08 PROCEDURE — 99999 PR OFFICE/OUTPT VISIT,PROCEDURE ONLY: CPT | Performed by: DIETITIAN, REGISTERED

## 2021-01-08 NOTE — PROGRESS NOTES
Aron Marsh Randall Covert  1/8/2021  Laparoscopic Maty-en- Y Gastric Bypass  6 months Post-Operative Follow-up. Telehealth follow up    Start time: 3776  End time: 0945  Services were provided through a telephone synchronous discussion virtually to substitute for in-person clinic visit. The patient was advised of the risks, benefits and alternatives to telemedicine and agreed to proceed with this type of visit. Pursuant to the emergency declaration under the [facility], [facility] waiver authority and the [employer] Act, this Virtual  Visit was conducted, with patient's consent, to reduce the patient's risk of exposure to COVID-19 and provide continuity of care. The patient was also advised the privacy standards of a standard visit continue to apply to telemedicine visits. Time spent time with telephone encounter with patient and family counciling and discussing care exceeded 15min. Additional time spent reviewing images and labs, discussing case with nursing, support staff and other physicians; as well as coordinating care [id number]. Subjective:   Eliel Hernandez Covert is a 28 y.o. female six months post Laparoscopic Maty-en-Y Gastric Bypass. Had foot surgery and is non weight bearing. She is not having swallowing difficulty, is compliant most of the time with the multivitamins and calcium + Vit D. She is meeting fluid recommendations of at least 64 ounces per day and is meeting protein recommendations. Exercise: no regular exercise. Weight= 239lbs  Today's weight represents a weight loss to date of 47 pounds. Allergies: Ciprofloxacin   Prior to Admission medications    Medication Sig Start Date End Date Taking?  Authorizing Provider   methylPREDNISolone (MEDROL DOSEPACK) 4 MG tablet Take 1 kat by mouth as directed 12/31/20  Yes El Frances DPM Elagolix Sodium (ORILISSA) 200 MG TABS Take 1 tablet by mouth 2 times daily 12/23/20  Yes Annia Moe MD   vitamin D (ERGOCALCIFEROL) 1.25 MG (01860 UT) CAPS capsule Take 1 capsule by mouth once a week 10/2/20  Yes Deepthi Avendaño MD   zinc 50 MG CAPS Take 50 mg by mouth See Admin Instructions Take Zinc Gluconate 50 mgs 1 tablet every Monday, Wednesday and Friday 10/2/20  Yes Deepthi Avendaño MD   ondansetron (ZOFRAN ODT) 4 MG disintegrating tablet Take 1 tablet by mouth every 8 hours as needed for Nausea or Vomiting 9/22/20  Yes Deepthi Avendaño MD   Ferrous Sulfate (IRON PO) Take 1 tablet by mouth daily   Yes Historical Provider, MD   Calcium Carbonate (CALCI-CHEW PO) Take 3 tablets by mouth daily   Yes Historical Provider, MD   Elagolix Sodium (ORILISSA) 200 MG TABS Take 1 tablet by mouth 2 times daily 6/26/20  Yes Annia Moe MD   diclofenac sodium (VOLTAREN) 1 % GEL Apply 2 g topically 4 times daily 6/5/20  Yes Zenon Rinne, DO   vitamin D (ERGOCALCIFEROL) 1.25 MG (85514 UT) CAPS capsule Take 1 capsule by mouth Twice a Week 4/13/20  Yes Deepthi Avendaño MD   SUMAtriptan (IMITREX) 50 MG tablet TAKE 1/2 TABLET BY MOUTH ONCE FOR ONE DOSE AS NEEDED FOR MIGRAINE 2/5/20  Yes Zenon Rinne, DO   propranolol (INDERAL) 10 MG tablet Take one 3 times daily, may increase to 2 twice daily in 2 wks. , migraine  Patient taking differently: 20 mg 2 times daily Take one 3 times daily, may increase to 2 twice daily in 2 wks. , migraine 1/24/20  Yes Gino Olivo MD   Multiple Vitamins-Minerals (MULTIVITAMIN ADULT PO) Take 1 tablet by mouth daily Indications: Bariatric Advantage Instructed to hold 5 days pre-op   Yes Historical Provider, MD   omeprazole (PRILOSEC) 20 MG delayed release capsule Take 20 mg by mouth Daily Instructed to take morning of surgery with a sip of water 2/15/17  Yes Historical Provider, MD  ondansetron (ZOFRAN ODT) 4 MG disintegrating tablet Take 1 tablet by mouth every 8 hours as needed for Nausea or Vomiting 15 tablet 0    Ferrous Sulfate (IRON PO) Take 1 tablet by mouth daily      Calcium Carbonate (CALCI-CHEW PO) Take 3 tablets by mouth daily      Elagolix Sodium (ORILISSA) 200 MG TABS Take 1 tablet by mouth 2 times daily 60 tablet 3    diclofenac sodium (VOLTAREN) 1 % GEL Apply 2 g topically 4 times daily 2 Tube 1    vitamin D (ERGOCALCIFEROL) 1.25 MG (46649 UT) CAPS capsule Take 1 capsule by mouth Twice a Week 24 capsule 1    SUMAtriptan (IMITREX) 50 MG tablet TAKE 1/2 TABLET BY MOUTH ONCE FOR ONE DOSE AS NEEDED FOR MIGRAINE 9 tablet 0    propranolol (INDERAL) 10 MG tablet Take one 3 times daily, may increase to 2 twice daily in 2 wks. , migraine (Patient taking differently: 20 mg 2 times daily Take one 3 times daily, may increase to 2 twice daily in 2 wks. , migraine) 186 tablet 5    Multiple Vitamins-Minerals (MULTIVITAMIN ADULT PO) Take 1 tablet by mouth daily Indications: Bariatric Advantage Instructed to hold 5 days pre-op      omeprazole (PRILOSEC) 20 MG delayed release capsule Take 20 mg by mouth Daily Instructed to take morning of surgery with a sip of water      aspirin 325 MG tablet Take 1 tablet by mouth 2 times daily for 14 days Take by mouth twice daily 28 tablet 0     No current facility-administered medications for this visit.         Allergies   Allergen Reactions    Ciprofloxacin Anaphylaxis       Family History   Problem Relation Age of Onset    High Blood Pressure Mother     High Cholesterol Mother     Arthritis Father     No Known Problems Sister     Cancer Maternal Grandmother     COPD Maternal Grandmother     Diabetes Maternal Grandmother     Elevated Lipids Maternal Grandmother     Hypertension Maternal Grandmother     Heart Disease Maternal Grandfather     Cancer Maternal Grandfather     Diabetes Maternal Grandfather        Social History Socioeconomic History    Marital status:      Spouse name: Not on file    Number of children: Not on file    Years of education: Not on file    Highest education level: Not on file   Occupational History    Not on file   Social Needs    Financial resource strain: Not on file    Food insecurity     Worry: Not on file     Inability: Not on file    Transportation needs     Medical: Not on file     Non-medical: Not on file   Tobacco Use    Smoking status: Former Smoker     Packs/day: 0.50     Years: 22.00     Pack years: 11.00     Start date: 46     Quit date: 2015     Years since quittin.0    Smokeless tobacco: Never Used    Tobacco comment: quit smoking 2015   Substance and Sexual Activity    Alcohol use: Not Currently    Drug use: No    Sexual activity: Not on file   Lifestyle    Physical activity     Days per week: Not on file     Minutes per session: Not on file    Stress: Not on file   Relationships    Social connections     Talks on phone: Not on file     Gets together: Not on file     Attends Latter-day service: Not on file     Active member of club or organization: Not on file     Attends meetings of clubs or organizations: Not on file     Relationship status: Not on file    Intimate partner violence     Fear of current or ex partner: Not on file     Emotionally abused: Not on file     Physically abused: Not on file     Forced sexual activity: Not on file   Other Topics Concern    Not on file   Social History Narrative    Not on file           A complete 10 system review was performed and are otherwise negative unless mentioned in the above HPI. Specific negatives are listed below but may not include all those reviewed.     General ROS: negative obtundation, AMS  ENT ROS: negative rhinorrhea, epistaxis  Allergy and Immunology ROS: negative itchy/watery eyes or nasal congestion  Hematological and Lymphatic ROS: negative spontaneous bleeding or bruising

## 2021-01-08 NOTE — PATIENT INSTRUCTIONS
Please continue to take your vitamin and mineral supplements as instructed. If you received a blood work prescription today for laboratory monitoring due prior to your next routine follow-up visit, please have this blood work obtained 10 to 14 days prior to your next visit. It is important to fast for 12 hours prior to routine weight loss surgery blood work, EXCEPT for drinking water, to ensure accuracy of results. Please report nausea, vomiting, abdominal pain, or any other problems you experience to your surgeon. For problems related to weight loss surgery, [facility] is [facility] and have your surgeon paged.

## 2021-01-08 NOTE — PROGRESS NOTES
Medical Nutrition Therapy (MNT) Assessment   Pt is aware this phone call conversation will be documented and is in agreement to the documentation: Pt verbalized - Yes    Pt. Name: Liam Marshall Covert   Date:1/8/2021  F/U Appt: Sx Type 6 Month RYGB F/U Appt      There were no vitals taken for this visit. Wt Readings from Last 3 Encounters:   12/21/20 239 lb (108.4 kg)   11/02/20 251 lb (113.9 kg)   10/26/20 251 lb (113.9 kg)                     Protein supplements: Pt. is currently using the following protein supplement Nectar and consuming the following grams of protein 60 - 70. Rd / Ld reviewed with patient based on 1.0 gram per kg of IBW patient needs 74 - 84 grams of protein total daily.     Subjective:                   Current MNT:       Bariatric soft diet introducing raw fruit, raw vegetables, rice, protein bars, multivitamin, calcium, protein supplements     MNT Advanced to:     Bariatric soft diet introducing raw fruit, raw vegetables, rice, protein bars, multivitamin, calcium, protein supplements      Allergies and Food Allergies and Food Intolerances: None    Nutritional Data  No - Poor appetite more than 5 days     No - NPO or clear liquid more than 3 days     No - Problem Chewing      No - Problem Swallowing      No - Problem Mouth Pain      No - Problem Denture  No - Missing Teeth         No - Pressure Sore    No - Open Wound    No - Surgical Wound  No - Documented: Sepsis or Infection    No - Nausea  No - Vomiting    [facility] Bariatric Surgeons Bowel Protocol:  Patient states he / she has the following bowel movements per week  - Daily  No - Diarrhea  No - Steatorrhea  No - Constipation  When was your last bowel movement  - Yesterday    How much plain water are you drinking daily  - 100 oz  What other beverages / fluids are you drinking daily and the amount - Decaf Tea    No - Are you taking Colace daily  What amount of Colace are you taking daily  - N/A Yes - Are you taking Sugar Free Chewable Fiber Gummies / Supplements  What amount of Sugar Free Chewable Fiber Gummies are you taking daily  - 2 Daily    No - Did your surgeon discuss constipation with you? No - Did your surgeon discuss increasing water intake? How much water were you instructed to take daily? Joey Rosario reviewed 1/8/21 Patient was instructed by Joey Rosario on the importance of increasing water intake to 48 - 64 oz. of water total daily. Pt. was also instructed he / she is allowed an additional 30 oz. of sugar free caffeine free clear liquid beverages for a total of 90 oz. of fluid total daily. Pt. was able to verbalize how he / she can get more water and fluids within the diet. Pt. verbalized understanding. No - Did your surgeon discuss continuing Colace? How much Colace did your surgeon instruct you to take? Joey Rosario reviewed 1/8/21 Per the surgeons protocol you should be taking since the day of your surgery Colace - 100 mgs 1 tablet 2 times daily until your 6 week F/U appointment. Joey ROSARIO reviewed. See After Visit Summary. Yes - Did your surgeon discuss stopping Colace? Yes - Did your surgeon discuss starting Fiber Gummies / Supplements? How much Fiber Gummies did your surgeon instruct you to take? Joey Rosario reviewed 1/8/21 Per the surgeons protocol you should be taking a fiber supplement lifelong since your two week follow-up appointment. Your surgeon recommends a daily approved Fiber supplement 15 grams total daily. If you are just introducing a Fiber supplement the 15 grams should be introduced 5 grams of a fiber supplement daily the 1st week, 10 grams of a fiber supplement daily the 2nd week and 15 grams of a fiber supplement daily the third week. Pt is instructed to continue the 15 grams of a fiber supplement daily. Joey Rosario reviewed. See After Visits Summary. Did your surgeon discuss any other medications / supplements to take daily to move your bowels and avoid constipation?  No Yes -  Patient was provided today the Constipation Handout - Joey Langston reviewed Handout - Pt. verbalized understanding. See AVS  Yes Joey Langston reinforced pt needs to consume the following - Water Intake should be 64  90 oz, Fiber Chews 15, Dietary Fiber Intake 25 - 35 grams        Yes - Hair loss   No - Weight regain       No - Acid Reflux  No - Dumping Syndrome      Yes - Food gets stuck - Ground Beef felt like getting stuck  Yes - Are you eating solids - should not be eating solids until 6 weeks post-op. not applicable - Night Time Coughing  not applicable -  B Ping Noted  Yes - Are you chewing thoroughly  - Does not take effect until 6 weeks post-op  Yes - Are you hungry after eating - sometimes but not very often     How many meals a day 4 to 5 / Portions Sizes of Meals are 3 to 4 oz         Labs: Pt is non-weight bearing and has to wait until her  has a day off to have labs drawn. Pt instructed to call the [facility] 10 days after to review results at [phone]. Pt verbalized understanding. Supplements: Bariatric Advantage Multi-Vitamin 1 tablet 2 times Daily, Bariatric Advantage 29 mgs Iron 1 tablet Daily, Bariatric Advanatge Calcium Lozenges 1 tablet 3 times Daily , Dry Vitamin D3 5,000iu every day    Estimated Daily Nutritional Needs: Based on Bariatric procedure for Wt. Loss  Energy: Will be calculated at Maintenance Stage    Protein: Average 60  80 gms Daily - See individual needs listed above based on IBW    MNT Plan and Additional Education: RD/MARLENE reviewed Bariatric Soft Diet incorporating in Raw Fruits, Raw Vegetables, Red Meat, and Rice. Encouraged pt to meet protein and fluid needs daily. Handouts given. Pt. verbalized understanding. Pt instructed to call with questions. Yes 1. Pt is consuming his / her recommended amount of protein within the diet based on patients [place] Body Weight. Jace Dominguez Yes 2. Pt is using the recommended Bariatric approved protein supplement and taking in the correct amount of protein daily. Pt is able to verbalize how to mix his / her protein supplement correctly to meet pts needs. Pt verbalized understanding. Yes 3. Pt is using the recommended Bariatric approved Multi-Vitamin, Bariatric approved Calcium, Bariatric approved Iron supplement or Bariatric approved Vitamin D and taking the correct dose. Pt was educated per his / her Bariatric Surgeons protocol he / she needs the following daily -  Bariatric Advantage Multi-Vitamin 1 tablet 2 times Daily, Bariatric Advantage 29 mgs Iron 1 tablet Daily, Bariatric Advantage Calcium Lozenges 1 tablet 3 times Daily , Dry Vitamin D3 5,000iu every day. Pt verbalized understanding. Yes 4. Pt kept daily food records. Pt is aware food records need to be kept life-long daily and brought to all follow-up appointments in order to show compliancy after weight loss surgery. Pt verbalized understanding. Yes 5. Pt is taking the correct stool softener for the first 6 weeks with the correct dose. Pt is also taking the correct fiber supplement with the correct dose starting at his / her 2 week f/u appointment. Pt verbalized understanding. See above instruction and AVS.     Yes 6. Pt is drinking 64 - 90 oz of plain water daily. Patient was instructed on the importance of increasing water intake to 48 - 64 oz. of water total daily. Pt. was also instructed he / she is allowed an additional 30 oz. of sugar free caffeine free clear liquid beverages for a total of 90 oz. of fluid total daily. Pt. was able to verbalize how he / she can get more water and fluids within the diet. Per bariatric surgeons protocol after weight loss surgery. Pt. verbalized understanding. Yes 7.  Pt achieved his / her percentage of excess body weight loss at his / her f/u appointments and is on track to reach his / her weight loss goal. Yes 8. Pt is weighing and measuring all foods using a food scale and measuring cups. Pt reports portion sizes are 3 to 4 oz in size. Portion sizes are not exceeding 6 ounces total per meal lifelong. Pt verbalized understanding. .     Yes 9. Pt is not experiencing Dumping Syndrome from food / beverage consumption. Yes 10. Pt is complying with all stages and consistencies of the bariatric diet and is not eating or drinking foods / beverages that is not listed on the diet at this stage. Yes (Staff FYI) - Pt is not drinking carbonated beverages, alcoholic beverages or juices at this time. Compliancy Scale  - After Weight Loss Surgery :  10 Good - Dietary Compliance - This is based on patient following the Medical Nutrition Therapy protocol after Weight Loss Surgery  7 to 10 Points - Good (70% - to 100%) -  Pt is following what he / she has been instructed on at the time of this visit. 4 to 7 Points - Fair (40% to 70%) - Pt has areas that he / she needs to work on in order to be compliant with the bariatric protocol after weight loss surgery. 0 to 4 Points - Poor (0% - 40%) - Pt is failing to follow the instructions given to the pt. Joey Langston has concerns pt may be creating harm. Pt was offered additional dietary counseling appointments to help pt make the necessary lifestyle changes to show compliancy after weight loss surgery. MEDICAL NUTRITION THERAPY (MNT)  Nutrition Care Plan   (The patient has been educated and given written education material that reinforces the following dietary guidelines for Bariatric Surgery)  Goal:  Patient able to verbalize the following dietary concepts:  3 to 4 ounce portions per meal     6 small meals daily      60 to 80 grams of protein on average see individual protein needs   48 to 64 ounces of fluid daily (water)     Always consume protein item first with all meals   Pt is aware wt loss is pt controlled. Yes - Pt. is able to verbalize diet concepts      Yes - Constipation Handout Given and Reviewed - Part of surgeons Bowel Protocol - See After Visit Summary    Yes - High Fiber Handout Given and Reviewed - Part of surgeons Bowel Protocol - See After Visit Summary        No - Ulcer Handout Given and Reviewed          No - Pt. was instructed to continue current MNT   Yes - Pt. diet was advanced to the following stage ( See above)   No - Supplements: initiated (See list below  - Pt. given instruction)     No - Supplemental foods:______________________  No - Pt. was placed on a altered meal schedule

## 2021-01-14 ENCOUNTER — OFFICE VISIT (OUTPATIENT)
Dept: PODIATRY | Age: 36
End: 2021-01-14

## 2021-01-14 DIAGNOSIS — M67.01 SHORT ACHILLES TENDON (ACQUIRED), RIGHT ANKLE: Primary | ICD-10-CM

## 2021-01-14 DIAGNOSIS — K91.2 MALNUTRITION FOLLOWING GASTROINTESTINAL SURGERY: ICD-10-CM

## 2021-01-14 DIAGNOSIS — M19.079 ARTHRITIS, MIDFOOT: ICD-10-CM

## 2021-01-14 LAB
ALBUMIN SERPL-MCNC: 4.1 G/DL (ref 3.5–5.2)
ALP BLD-CCNC: 103 U/L (ref 35–104)
ALT SERPL-CCNC: 34 U/L (ref 0–32)
ANION GAP SERPL CALCULATED.3IONS-SCNC: 19 MMOL/L (ref 7–16)
AST SERPL-CCNC: 25 U/L (ref 0–31)
BILIRUB SERPL-MCNC: 0.5 MG/DL (ref 0–1.2)
BUN BLDV-MCNC: 17 MG/DL (ref 6–20)
CALCIUM SERPL-MCNC: 9.7 MG/DL (ref 8.6–10.2)
CHLORIDE BLD-SCNC: 102 MMOL/L (ref 98–107)
CHOLESTEROL, TOTAL: 191 MG/DL (ref 0–199)
CO2: 21 MMOL/L (ref 22–29)
CREAT SERPL-MCNC: 0.8 MG/DL (ref 0.5–1)
FERRITIN: 44 NG/ML
FOLATE: 5.3 NG/ML (ref 4.8–24.2)
GFR AFRICAN AMERICAN: >60
GFR NON-AFRICAN AMERICAN: >60 ML/MIN/1.73
GLUCOSE BLD-MCNC: 86 MG/DL (ref 74–99)
HCT VFR BLD CALC: 43 % (ref 34–48)
HEMOGLOBIN: 14 G/DL (ref 11.5–15.5)
MCH RBC QN AUTO: 29.5 PG (ref 26–35)
MCHC RBC AUTO-ENTMCNC: 32.6 % (ref 32–34.5)
MCV RBC AUTO: 90.5 FL (ref 80–99.9)
PDW BLD-RTO: 12.8 FL (ref 11.5–15)
PLATELET # BLD: 298 E9/L (ref 130–450)
PMV BLD AUTO: 10 FL (ref 7–12)
POTASSIUM SERPL-SCNC: 4.1 MMOL/L (ref 3.5–5)
PREALBUMIN: 27 MG/DL (ref 20–40)
RBC # BLD: 4.75 E12/L (ref 3.5–5.5)
SODIUM BLD-SCNC: 142 MMOL/L (ref 132–146)
TOTAL PROTEIN: 7.6 G/DL (ref 6.4–8.3)
TRIGL SERPL-MCNC: 100 MG/DL (ref 0–149)
VITAMIN B-12: 462 PG/ML (ref 211–946)
VITAMIN D 25-HYDROXY: 24 NG/ML (ref 30–100)
WBC # BLD: 9.6 E9/L (ref 4.5–11.5)

## 2021-01-14 PROCEDURE — 99024 POSTOP FOLLOW-UP VISIT: CPT | Performed by: PODIATRIST

## 2021-01-14 NOTE — PROGRESS NOTES
Patient in office for post op visit 4. Postop day of surgery 2020 fusion first tarsometatarsal joint right foot and tendo Achilles lengthening right lower leg. Kathlyne Cogan Covert : 1985 Sex: female  Age: 28 y.o. Patient was referred by Nicole Ponce DO    CC:    Postop day of surgery 2020 fusion first tarsometatarsal joint right foot and tendo Achilles lengthening right lower leg      HPI:   Postop day of surgery 2020 fusion first tarsometatarsal joint right foot and tendo Achilles lengthening right lower leg    Continues aspirin 325 twice daily. Presents with her . No foot or ankle pain. Does continue knee scooter has been nonweightbearing. Has been using the Cam walking boot and leaving on all the time. No additional injuries or pedal complaints at this time.     ROS:  Const: Denies constitutional symptoms  Musculo: Denies symptoms other than stated above  Skin: Denies symptoms other than stated above       Current Outpatient Medications:     methylPREDNISolone (MEDROL DOSEPACK) 4 MG tablet, Take 1 kat by mouth as directed, Disp: 1 kit, Rfl: 0    Elagolix Sodium (ORILISSA) 200 MG TABS, Take 1 tablet by mouth 2 times daily, Disp: 60 tablet, Rfl: 0    aspirin 325 MG tablet, Take 1 tablet by mouth 2 times daily for 14 days Take by mouth twice daily, Disp: 28 tablet, Rfl: 0    vitamin D (ERGOCALCIFEROL) 1.25 MG (95675 UT) CAPS capsule, Take 1 capsule by mouth once a week, Disp: 12 capsule, Rfl: 0    zinc 50 MG CAPS, Take 50 mg by mouth See Admin Instructions Take Zinc Gluconate 50 mgs 1 tablet every Monday, Wednesday and Friday, Disp: 30 capsule, Rfl: 0    ondansetron (ZOFRAN ODT) 4 MG disintegrating tablet, Take 1 tablet by mouth every 8 hours as needed for Nausea or Vomiting, Disp: 15 tablet, Rfl: 0    Ferrous Sulfate (IRON PO), Take 1 tablet by mouth daily, Disp: , Rfl: Focused Lower Extremity Physical Exam:    Neurovascular examination:    Dorsalis Pedis palpable bilateral.  Posterior tibialis palpable bilateral.    Capillary Refill Time:  Immediate return  Hair growth:  Symmetrical and bilateral   Skin:  Not atrophic  Edema: No edema bilateral feet or ankles. Neurologic:  Light touch intact bilateral.      Musculoskeletal/ Orthopedic examination:    Wiggling toes  Negative Homans  No pain to palpation right foot. Negative Nadeen Dark    Dermatology examination:    No open wounds posterior right Achilles. Skin is well coapted dorsal first tarsometatarsal joint. No erythema or edema right foot. Assessment and Plan:  Carlos Wooten was seen today for post-op check. Diagnoses and all orders for this visit:    Short Achilles tendon (acquired), right ankle    Arthritis, midfoot          Postop day of surgery 12/21/2020 fusion first tarsometatarsal joint right foot and tendo Achilles lengthening right lower leg    Skin well coapted posterior stab incisions for Achilles tendon lengthening and dorsal right first tarsometatarsal joint incision. Progressing very well pain-free. Continue aspirin 325 twice daily. I did apply bandage today with Adaptic 4 x 4 Siva Ace bandage. Change bandage in 3 to 5 days. Did give her a bandage supplies today. Strict nonweightbearing with Cam walking boot at all times treated as a cast.  Continue knee scooter. Any increase calf pain or foot pain go to emergency room. She is pain-free progressing well from foot ankle standpoint. Follow-up 2 weeks. She will be approximately 6 weeks following fusion right first tarsometatarsal joint and Achilles tendon lengthening right lower leg. Repeat radiographs right foot at follow-up. Return in about 2 weeks (around 1/28/2021). Seen By:  Angelo Meza DPM      Document was created using voice recognition software. Note was reviewed, however may contain grammatical errors.

## 2021-01-17 LAB
COPPER: 130.5 UG/DL (ref 80–155)
SELENIUM: 146.8 UG/L (ref 23–190)
ZINC: 64.7 UG/DL (ref 60–120)

## 2021-01-20 DIAGNOSIS — G43.019 INTRACTABLE MIGRAINE WITHOUT AURA AND WITHOUT STATUS MIGRAINOSUS: ICD-10-CM

## 2021-01-20 LAB — VITAMIN B1 WHOLE BLOOD: 134 NMOL/L (ref 70–180)

## 2021-01-20 RX ORDER — PROPRANOLOL HYDROCHLORIDE 10 MG/1
TABLET ORAL
Qty: 120 TABLET | Refills: 5 | Status: SHIPPED
Start: 2021-01-20 | End: 2021-04-23

## 2021-01-28 ENCOUNTER — OFFICE VISIT (OUTPATIENT)
Dept: PODIATRY | Age: 36
End: 2021-01-28

## 2021-01-28 DIAGNOSIS — M19.079 ARTHRITIS, MIDFOOT: ICD-10-CM

## 2021-01-28 DIAGNOSIS — M67.01 SHORT ACHILLES TENDON (ACQUIRED), RIGHT ANKLE: Primary | ICD-10-CM

## 2021-01-28 PROCEDURE — 99024 POSTOP FOLLOW-UP VISIT: CPT | Performed by: PODIATRIST

## 2021-01-28 NOTE — PROGRESS NOTES
Patient in office for post op visit 5. Postop day of surgery 2020 fusion first tarsometatarsal joint right foot and tendo Achilles lengthening right lower leg. Xray prior to apt. Mel Murphy Covert : 1985 Sex: female  Age: 28 y.o. Patient was referred by Jesusita Carlisle DO    CC:    Postop day of surgery 2020 fusion first tarsometatarsal joint right foot and tendo Achilles lengthening right lower leg      HPI:   Postop day of surgery 2020 fusion first tarsometatarsal joint right foot and tendo Achilles lengthening right lower leg    Continues aspirin 325 twice daily. Continues Cam walking boot. Presents today with her significant other. No foot pain or calf pain. Does continue knee scooter. Pleased with progression. Pain-free today.       ROS:  Const: Denies constitutional symptoms  Musculo: Denies symptoms other than stated above  Skin: Denies symptoms other than stated above       Current Outpatient Medications:     propranolol (INDERAL) 10 MG tablet, Take 2 twice daily, migraine, Disp: 120 tablet, Rfl: 5    methylPREDNISolone (MEDROL DOSEPACK) 4 MG tablet, Take 1 kat by mouth as directed, Disp: 1 kit, Rfl: 0    Elagolix Sodium (ORILISSA) 200 MG TABS, Take 1 tablet by mouth 2 times daily, Disp: 60 tablet, Rfl: 0    aspirin 325 MG tablet, Take 1 tablet by mouth 2 times daily for 14 days Take by mouth twice daily, Disp: 28 tablet, Rfl: 0    vitamin D (ERGOCALCIFEROL) 1.25 MG (75870 UT) CAPS capsule, Take 1 capsule by mouth once a week, Disp: 12 capsule, Rfl: 0    zinc 50 MG CAPS, Take 50 mg by mouth See Admin Instructions Take Zinc Gluconate 50 mgs 1 tablet every Monday, Wednesday and Friday, Disp: 30 capsule, Rfl: 0    ondansetron (ZOFRAN ODT) 4 MG disintegrating tablet, Take 1 tablet by mouth every 8 hours as needed for Nausea or Vomiting, Disp: 15 tablet, Rfl: 0    Ferrous Sulfate (IRON PO), Take 1 tablet by mouth daily, Disp: , Rfl:   Calcium Carbonate (CALCI-CHEW PO), Take 3 tablets by mouth daily, Disp: , Rfl:     Elagolix Sodium (ORILISSA) 200 MG TABS, Take 1 tablet by mouth 2 times daily, Disp: 60 tablet, Rfl: 3    diclofenac sodium (VOLTAREN) 1 % GEL, Apply 2 g topically 4 times daily, Disp: 2 Tube, Rfl: 1    vitamin D (ERGOCALCIFEROL) 1.25 MG (98892 UT) CAPS capsule, Take 1 capsule by mouth Twice a Week, Disp: 24 capsule, Rfl: 1    SUMAtriptan (IMITREX) 50 MG tablet, TAKE 1/2 TABLET BY MOUTH ONCE FOR ONE DOSE AS NEEDED FOR MIGRAINE, Disp: 9 tablet, Rfl: 0    Multiple Vitamins-Minerals (MULTIVITAMIN ADULT PO), Take 1 tablet by mouth daily Indications: Bariatric Advantage Instructed to hold 5 days pre-op, Disp: , Rfl:     omeprazole (PRILOSEC) 20 MG delayed release capsule, Take 20 mg by mouth Daily Instructed to take morning of surgery with a sip of water, Disp: , Rfl:   Allergies   Allergen Reactions    Ciprofloxacin Anaphylaxis       Past Medical History:   Diagnosis Date    Allergic rhinitis     history    Anesthesia complication     pulse ox dropped    Arthritis     Chronic sinusitis     Chronic tension-type headache, intractable 1/24/2020    Constipation     Depression with anxiety     Folic acid deficiency 3/46/7545    Migraine headache 1/24/2020    Morbidly obese (HCC)     PCOS (polycystic ovarian syndrome) 1/24/2020    Prolonged emergence from general anesthesia     RLS (restless legs syndrome)     Vitamin D deficiency 1/24/2020           There were no vitals filed for this visit. Work History/Social History: [employer]  Orthopedic history: MRI right ankle July 2020 rupture right posterior tibial tendon    Focused Lower Extremity Physical Exam:    Neurovascular examination:    Dorsalis Pedis palpable bilateral.  Posterior tibialis palpable bilateral.    Capillary Refill Time:  Immediate return  Hair growth:  Symmetrical and bilateral   Skin:  Not atrophic  Edema: No edema bilateral feet or ankles. Neurologic:  Light touch intact bilateral.      Musculoskeletal/ Orthopedic examination:    Wiggling toes  Negative Homans  No pain to palpation right foot. Negative [facility]    Dermatology examination:    No open wounds posterior right Achilles. Skin is well coapted dorsal first tarsometatarsal joint. No surrounding erythema. Skin incisions well coapted. Assessment and Plan:  Carlos Wooten was seen today for post-op check. Diagnoses and all orders for this visit:    Short Achilles tendon (acquired), right ankle  -     XR FOOT RIGHT (MIN 3 VIEWS); Future    Arthritis, midfoot          Postop day of surgery 12/21/2020 fusion first tarsometatarsal joint right foot and tendo Achilles lengthening right lower leg    Skin is well coapted. Patient is pain-free pleased with progression. Did review radiographs. Stable fixation at the first tarsometatarsal joint with plate and screw fixation noted. Continue nonweightbearing 1 more week with knee scooter. I did change bandage Adaptic and Ace wrap. Once daily bandage change. Importance of Cam walking boot at all times. After 1 week patient can transition to weightbearing as tolerated with heel contact with Cam walking boot once again at all times. Any calf pain go to emergency room. I will follow-up 2 weeks. Hopeful transition to regular shoe. Return in about 2 weeks (around 2/11/2021). Seen By:  Angelo Meza DPM      Document was created using voice recognition software. Note was reviewed, however may contain grammatical errors.

## 2021-02-11 ENCOUNTER — OFFICE VISIT (OUTPATIENT)
Dept: PODIATRY | Age: 36
End: 2021-02-11

## 2021-02-11 DIAGNOSIS — M19.079 ARTHRITIS, MIDFOOT: ICD-10-CM

## 2021-02-11 DIAGNOSIS — M67.01 SHORT ACHILLES TENDON (ACQUIRED), RIGHT ANKLE: Primary | ICD-10-CM

## 2021-02-11 PROCEDURE — 99024 POSTOP FOLLOW-UP VISIT: CPT | Performed by: PODIATRIST

## 2021-02-11 NOTE — PROGRESS NOTES
Patient in office for post op visit 6. Postop day of surgery 2020 fusion first tarsometatarsal joint right foot and tendo Achilles lengthening right lower leg. Xray prior to apt. Noni Nava Covert : 1985 Sex: female  Age: 28 y.o. Patient was referred by Yue Brooks DO    CC:    Postop day of surgery 2020 fusion first tarsometatarsal joint right foot and tendo Achilles lengthening right lower leg      HPI:   Postop day of surgery 2020 fusion first tarsometatarsal joint right foot and tendo Achilles lengthening right lower leg    Pain-free. Walking in a Cam walking boot. Did have repeat radiographs today. No calf pain. Very pleased with the progression.           ROS:  Const: Denies constitutional symptoms  Musculo: Denies symptoms other than stated above  Skin: Denies symptoms other than stated above       Current Outpatient Medications:     propranolol (INDERAL) 10 MG tablet, Take 2 twice daily, migraine, Disp: 120 tablet, Rfl: 5    methylPREDNISolone (MEDROL DOSEPACK) 4 MG tablet, Take 1 kat by mouth as directed, Disp: 1 kit, Rfl: 0    Elagolix Sodium (ORILISSA) 200 MG TABS, Take 1 tablet by mouth 2 times daily, Disp: 60 tablet, Rfl: 0    aspirin 325 MG tablet, Take 1 tablet by mouth 2 times daily for 14 days Take by mouth twice daily, Disp: 28 tablet, Rfl: 0    vitamin D (ERGOCALCIFEROL) 1.25 MG (76953 UT) CAPS capsule, Take 1 capsule by mouth once a week, Disp: 12 capsule, Rfl: 0    zinc 50 MG CAPS, Take 50 mg by mouth See Admin Instructions Take Zinc Gluconate 50 mgs 1 tablet every Monday, Wednesday and Friday, Disp: 30 capsule, Rfl: 0    ondansetron (ZOFRAN ODT) 4 MG disintegrating tablet, Take 1 tablet by mouth every 8 hours as needed for Nausea or Vomiting, Disp: 15 tablet, Rfl: 0    Ferrous Sulfate (IRON PO), Take 1 tablet by mouth daily, Disp: , Rfl:     Calcium Carbonate (CALCI-CHEW PO), Take 3 tablets by mouth daily, Disp: , Rfl:   Elagolix Sodium (ORILISSA) 200 MG TABS, Take 1 tablet by mouth 2 times daily, Disp: 60 tablet, Rfl: 3    diclofenac sodium (VOLTAREN) 1 % GEL, Apply 2 g topically 4 times daily, Disp: 2 Tube, Rfl: 1    vitamin D (ERGOCALCIFEROL) 1.25 MG (51554 UT) CAPS capsule, Take 1 capsule by mouth Twice a Week, Disp: 24 capsule, Rfl: 1    SUMAtriptan (IMITREX) 50 MG tablet, TAKE 1/2 TABLET BY MOUTH ONCE FOR ONE DOSE AS NEEDED FOR MIGRAINE, Disp: 9 tablet, Rfl: 0    Multiple Vitamins-Minerals (MULTIVITAMIN ADULT PO), Take 1 tablet by mouth daily Indications: Bariatric Advantage Instructed to hold 5 days pre-op, Disp: , Rfl:     omeprazole (PRILOSEC) 20 MG delayed release capsule, Take 20 mg by mouth Daily Instructed to take morning of surgery with a sip of water, Disp: , Rfl:   Allergies   Allergen Reactions    Ciprofloxacin Anaphylaxis       Past Medical History:   Diagnosis Date    Allergic rhinitis     history    Anesthesia complication     pulse ox dropped    Arthritis     Chronic sinusitis     Chronic tension-type headache, intractable 1/24/2020    Constipation     Depression with anxiety     Folic acid deficiency 5/30/9300    Migraine headache 1/24/2020    Morbidly obese (HCC)     PCOS (polycystic ovarian syndrome) 1/24/2020    Prolonged emergence from general anesthesia     RLS (restless legs syndrome)     Vitamin D deficiency 1/24/2020           There were no vitals filed for this visit. Work History/Social History: [employer]  Orthopedic history: MRI right ankle July 2020 rupture right posterior tibial tendon    Focused Lower Extremity Physical Exam:    Neurovascular examination:    Dorsalis Pedis palpable bilateral.  Posterior tibialis palpable bilateral.    Capillary Refill Time:  Immediate return  Hair growth:  Symmetrical and bilateral   Skin:  Not atrophic  Edema: No edema bilateral feet or ankles.   Neurologic:  Light touch intact bilateral. Musculoskeletal/ Orthopedic examination:    Wiggling toes  Negative Homans  No pain to palpation right foot. Negative Moore  No pain to palpation overlying incision dorsal right foot    Dermatology examination:    Skin well coapted dorsal linear incision right medial foot. Skin well coapted posterior incisions right Achilles. Assessment and Plan:  Ramon Saldana was seen today for post-op check. Diagnoses and all orders for this visit:    Short Achilles tendon (acquired), right ankle  -     XR FOOT RIGHT (MIN 3 VIEWS); Future    Arthritis, midfoot          Postop day of surgery 12/21/2020 fusion first tarsometatarsal joint right foot and tendo Achilles lengthening right lower leg    [employer] is progressing very well she is pain-free. Radiographs reviewed. Plate and screw fixation with early signs of fusion first tarsometatarsal joint noted. I still did recommend continued use of Cam walking boot weightbearing as tolerated. Remove boot at night. Any calf pain go the emergency room. I will follow-up 2 weeks. Hopeful transition to regular shoe at this time. She would like to return to work March 1. Return in about 2 weeks (around 2/25/2021). Seen By:  Aly Alexandre DPM      Document was created using voice recognition software. Note was reviewed, however may contain grammatical errors.

## 2021-02-22 ENCOUNTER — OFFICE VISIT (OUTPATIENT)
Dept: PODIATRY | Age: 36
End: 2021-02-22
Payer: COMMERCIAL

## 2021-02-22 DIAGNOSIS — S99.921A INJURY OF RIGHT FOOT, INITIAL ENCOUNTER: ICD-10-CM

## 2021-02-22 DIAGNOSIS — S86.001A INJURY OF RIGHT ACHILLES TENDON, INITIAL ENCOUNTER: ICD-10-CM

## 2021-02-22 DIAGNOSIS — M67.01 SHORT ACHILLES TENDON (ACQUIRED), RIGHT ANKLE: Primary | ICD-10-CM

## 2021-02-22 PROCEDURE — 99213 OFFICE O/P EST LOW 20 MIN: CPT | Performed by: PODIATRIST

## 2021-02-22 RX ORDER — METHYLPREDNISOLONE 4 MG/1
TABLET ORAL
Qty: 1 KIT | Refills: 0 | Status: SHIPPED
Start: 2021-02-22 | End: 2021-03-19

## 2021-02-22 NOTE — PROGRESS NOTES
Patient in office for post op visit 7. Postop day of surgery 2020 fusion first tarsometatarsal joint right foot and tendo Achilles lengthening right lower leg. Patient states she fell earlier today while not wearing her boot. Having pain and edema. Xray prior to apt.       Griselda Alba Covert : 1985 Sex: female  Age: 28 y.o. Patient was referred by Gera Mac DO    CC:    Postop day of surgery 2020 fusion first tarsometatarsal joint right foot and tendo Achilles lengthening right lower leg  New injury right foot    HPI:   Postop day of surgery 2020 fusion first tarsometatarsal joint right foot and tendo Achilles lengthening right lower leg    Rosina Weeks presents today postoperative almost 2 months fusion right first tarsometatarsal joint no pain right foot. States earlier today she did fall when she was outside walking without her boot on. Denies any foot pain today. States majority tenderness is on the back of the heel and back of the Achilles. No calf pain. No open skin lesions or abrasions. Able to walk today but has returned to the walking boot. Does present today with her friend.         ROS:  Const: Denies constitutional symptoms  Musculo: Denies symptoms other than stated above  Skin: Denies symptoms other than stated above       Current Outpatient Medications:     methylPREDNISolone (MEDROL DOSEPACK) 4 MG tablet, Take 1 kat by mouth as directed, Disp: 1 kit, Rfl: 0    propranolol (INDERAL) 10 MG tablet, Take 2 twice daily, migraine, Disp: 120 tablet, Rfl: 5    methylPREDNISolone (MEDROL DOSEPACK) 4 MG tablet, Take 1 kat by mouth as directed, Disp: 1 kit, Rfl: 0    Elagolix Sodium (ORILISSA) 200 MG TABS, Take 1 tablet by mouth 2 times daily, Disp: 60 tablet, Rfl: 0    aspirin 325 MG tablet, Take 1 tablet by mouth 2 times daily for 14 days Take by mouth twice daily, Disp: 28 tablet, Rfl: 0   vitamin D (ERGOCALCIFEROL) 1.25 MG (15137 UT) CAPS capsule, Take 1 capsule by mouth once a week, Disp: 12 capsule, Rfl: 0    zinc 50 MG CAPS, Take 50 mg by mouth See Admin Instructions Take Zinc Gluconate 50 mgs 1 tablet every Monday, Wednesday and Friday, Disp: 30 capsule, Rfl: 0    ondansetron (ZOFRAN ODT) 4 MG disintegrating tablet, Take 1 tablet by mouth every 8 hours as needed for Nausea or Vomiting, Disp: 15 tablet, Rfl: 0    Ferrous Sulfate (IRON PO), Take 1 tablet by mouth daily, Disp: , Rfl:     Calcium Carbonate (CALCI-CHEW PO), Take 3 tablets by mouth daily, Disp: , Rfl:     Elagolix Sodium (ORILISSA) 200 MG TABS, Take 1 tablet by mouth 2 times daily, Disp: 60 tablet, Rfl: 3    diclofenac sodium (VOLTAREN) 1 % GEL, Apply 2 g topically 4 times daily, Disp: 2 Tube, Rfl: 1    vitamin D (ERGOCALCIFEROL) 1.25 MG (02366 UT) CAPS capsule, Take 1 capsule by mouth Twice a Week, Disp: 24 capsule, Rfl: 1    SUMAtriptan (IMITREX) 50 MG tablet, TAKE 1/2 TABLET BY MOUTH ONCE FOR ONE DOSE AS NEEDED FOR MIGRAINE, Disp: 9 tablet, Rfl: 0    Multiple Vitamins-Minerals (MULTIVITAMIN ADULT PO), Take 1 tablet by mouth daily Indications: Bariatric Advantage Instructed to hold 5 days pre-op, Disp: , Rfl:     omeprazole (PRILOSEC) 20 MG delayed release capsule, Take 20 mg by mouth Daily Instructed to take morning of surgery with a sip of water, Disp: , Rfl:   Allergies   Allergen Reactions    Ciprofloxacin Anaphylaxis       Past Medical History:   Diagnosis Date    Allergic rhinitis     history    Anesthesia complication     pulse ox dropped    Arthritis     Chronic sinusitis     Chronic tension-type headache, intractable 1/24/2020    Constipation     Depression with anxiety     Folic acid deficiency 6/51/4556    Migraine headache 1/24/2020    Morbidly obese (HCC)     PCOS (polycystic ovarian syndrome) 1/24/2020    Prolonged emergence from general anesthesia     RLS (restless legs syndrome)  Vitamin D deficiency 1/24/2020           There were no vitals filed for this visit. Work History/Social History: [address] [facility]  Orthopedic history: MRI right ankle July 2020 rupture right posterior tibial tendon    Focused Lower Extremity Physical Exam:    Neurovascular examination:    Dorsalis Pedis palpable bilateral.  Posterior tibialis palpable bilateral.    Capillary Refill Time:  Immediate return  Hair growth:  Symmetrical and bilateral   Skin:  Not atrophic  Edema: Mild edema insertion right Achilles tendon    Neurologic:  Light touch intact bilateral.      Musculoskeletal/ Orthopedic examination:    Wiggling toes  No pain to palpation overlying incision dorsal right foot  No pain right foot   Negative Homans. Negative Moore. Mild tenderness insertion Achilles tendon right. Dermatology examination:    Skin well coapted dorsal linear incision right medial foot. Skin well coapted posterior incisions right Achilles. Assessment and Plan:  Venkata Pollock was seen today for post-op check. Diagnoses and all orders for this visit:    Short Achilles tendon (acquired), right ankle  -     XR FOOT RIGHT (MIN 3 VIEWS); Future    Injury of right Achilles tendon, initial encounter    Injury of right foot, initial encounter    Other orders  -     methylPREDNISolone (MEDROL DOSEPACK) 4 MG tablet; Take 1 kat by mouth as directed          Postop day of surgery 12/21/2020 fusion first tarsometatarsal joint right foot and tendo Achilles lengthening right lower leg    New injury right foot she did fall earlier today. Radiographs reviewed right foot. No change in plate and screw fixation first tarsometatarsal joint. Does present as Achilles tendon pain. Achilles clinically is intact. Return to walking boot for remaining week. Medrol Dosepak take as directed. I will follow-up this Thursday for regularly scheduled appointment. Return in about 1 week (around 3/1/2021).       Seen By: Jewell Dietrich DPM      Document was created using voice recognition software. Note was reviewed, however may contain grammatical errors.

## 2021-02-25 ENCOUNTER — OFFICE VISIT (OUTPATIENT)
Dept: CHIROPRACTIC MEDICINE | Age: 36
End: 2021-02-25
Payer: COMMERCIAL

## 2021-02-25 ENCOUNTER — OFFICE VISIT (OUTPATIENT)
Dept: FAMILY MEDICINE CLINIC | Age: 36
End: 2021-02-25
Payer: COMMERCIAL

## 2021-02-25 ENCOUNTER — OFFICE VISIT (OUTPATIENT)
Dept: PODIATRY | Age: 36
End: 2021-02-25

## 2021-02-25 VITALS
SYSTOLIC BLOOD PRESSURE: 114 MMHG | TEMPERATURE: 97.3 F | WEIGHT: 239 LBS | DIASTOLIC BLOOD PRESSURE: 70 MMHG | BODY MASS INDEX: 37.51 KG/M2 | RESPIRATION RATE: 18 BRPM | OXYGEN SATURATION: 98 % | HEIGHT: 67 IN | HEART RATE: 69 BPM

## 2021-02-25 VITALS
TEMPERATURE: 97.7 F | WEIGHT: 239 LBS | HEART RATE: 65 BPM | OXYGEN SATURATION: 96 % | HEIGHT: 67 IN | RESPIRATION RATE: 18 BRPM | BODY MASS INDEX: 37.51 KG/M2

## 2021-02-25 VITALS — BODY MASS INDEX: 37.51 KG/M2 | HEIGHT: 67 IN | WEIGHT: 239 LBS
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
## 2021-02-25 DIAGNOSIS — S86.001D INJURY OF RIGHT ACHILLES TENDON, SUBSEQUENT ENCOUNTER: ICD-10-CM

## 2021-02-25 DIAGNOSIS — S20.212A CONTUSION, CHEST WALL, LEFT, INITIAL ENCOUNTER: ICD-10-CM

## 2021-02-25 DIAGNOSIS — R07.81 RIB PAIN ON LEFT SIDE: ICD-10-CM

## 2021-02-25 DIAGNOSIS — S20.212A RIB CONTUSION, LEFT, INITIAL ENCOUNTER: Primary | ICD-10-CM

## 2021-02-25 DIAGNOSIS — M67.01 SHORT ACHILLES TENDON (ACQUIRED), RIGHT ANKLE: Primary | ICD-10-CM

## 2021-02-25 PROCEDURE — 97014 ELECTRIC STIMULATION THERAPY: CPT | Performed by: CHIROPRACTOR

## 2021-02-25 PROCEDURE — 99213 OFFICE O/P EST LOW 20 MIN: CPT | Performed by: CHIROPRACTOR

## 2021-02-25 PROCEDURE — 99024 POSTOP FOLLOW-UP VISIT: CPT | Performed by: PODIATRIST

## 2021-02-25 PROCEDURE — 96372 THER/PROPH/DIAG INJ SC/IM: CPT | Performed by: PHYSICIAN ASSISTANT

## 2021-02-25 PROCEDURE — 99214 OFFICE O/P EST MOD 30 MIN: CPT | Performed by: PHYSICIAN ASSISTANT

## 2021-02-25 RX ORDER — METHOCARBAMOL 750 MG/1
750 TABLET, FILM COATED ORAL 4 TIMES DAILY
Qty: 40 TABLET | Refills: 0 | Status: SHIPPED | OUTPATIENT
Start: 2021-02-25 | End: 2021-03-07

## 2021-02-25 RX ORDER — HYDROCODONE BITARTRATE AND ACETAMINOPHEN 5; 325 MG/1; MG/1
1 TABLET ORAL EVERY 8 HOURS PRN
Qty: 9 TABLET | Refills: 0 | Status: SHIPPED | OUTPATIENT
Start: 2021-02-25 | End: 2021-02-28

## 2021-02-25 RX ORDER — PREDNISONE 20 MG/1
TABLET ORAL
Qty: 18 TABLET | Refills: 0 | Status: SHIPPED
Start: 2021-02-25 | End: 2021-03-19

## 2021-02-25 RX ORDER — METHYLPREDNISOLONE ACETATE 80 MG/ML
60 INJECTION, SUSPENSION INTRA-ARTICULAR; INTRALESIONAL; INTRAMUSCULAR; SOFT TISSUE ONCE
Status: COMPLETED | OUTPATIENT
Start: 2021-02-25 | End: 2021-02-25

## 2021-02-25 RX ORDER — KETOROLAC TROMETHAMINE 30 MG/ML
30 INJECTION, SOLUTION INTRAMUSCULAR; INTRAVENOUS ONCE
Status: COMPLETED | OUTPATIENT
Start: 2021-02-25 | End: 2021-02-25

## 2021-02-25 RX ADMIN — METHYLPREDNISOLONE ACETATE 60 MG: 80 INJECTION, SUSPENSION INTRA-ARTICULAR; INTRALESIONAL; INTRAMUSCULAR; SOFT TISSUE at 12:04

## 2021-02-25 RX ADMIN — KETOROLAC TROMETHAMINE 30 MG: 30 INJECTION, SOLUTION INTRAMUSCULAR; INTRAVENOUS at 12:04

## 2021-02-25 NOTE — PROGRESS NOTES
  zinc 50 MG CAPS, Take 50 mg by mouth See Admin Instructions Take Zinc Gluconate 50 mgs 1 tablet every Monday, Wednesday and Friday, Disp: 30 capsule, Rfl: 0    ondansetron (ZOFRAN ODT) 4 MG disintegrating tablet, Take 1 tablet by mouth every 8 hours as needed for Nausea or Vomiting, Disp: 15 tablet, Rfl: 0    Ferrous Sulfate (IRON PO), Take 1 tablet by mouth daily, Disp: , Rfl:     Calcium Carbonate (CALCI-CHEW PO), Take 3 tablets by mouth daily, Disp: , Rfl:     Elagolix Sodium (ORILISSA) 200 MG TABS, Take 1 tablet by mouth 2 times daily, Disp: 60 tablet, Rfl: 3    diclofenac sodium (VOLTAREN) 1 % GEL, Apply 2 g topically 4 times daily, Disp: 2 Tube, Rfl: 1    vitamin D (ERGOCALCIFEROL) 1.25 MG (74461 UT) CAPS capsule, Take 1 capsule by mouth Twice a Week, Disp: 24 capsule, Rfl: 1    SUMAtriptan (IMITREX) 50 MG tablet, TAKE 1/2 TABLET BY MOUTH ONCE FOR ONE DOSE AS NEEDED FOR MIGRAINE, Disp: 9 tablet, Rfl: 0    Multiple Vitamins-Minerals (MULTIVITAMIN ADULT PO), Take 1 tablet by mouth daily Indications: Bariatric Advantage Instructed to hold 5 days pre-op, Disp: , Rfl:     omeprazole (PRILOSEC) 20 MG delayed release capsule, Take 20 mg by mouth Daily Instructed to take morning of surgery with a sip of water, Disp: , Rfl:     Exam:   Vitals:    02/25/21 1019   Pulse: 65   Resp: 18   Temp: 97.7 °F (36.5 °C)   SpO2: 96%     On examination she appears well. No acute distress. She is alert and oriented x3. She ambulates without difficulty. Thoracolumbar range of motion are moderately reduced in all planes with endrange pain reported in flexion and lateral flexion bilateral.    Light touch over the left parascapular region increases her symptoms. She has more prominent pain with palpation over ribs 2, 3, 4 left and in the intercostal spaces of these ribs. This pain with palpation continues into the lateral aspect of the ribs and axillary region. Huseyin Lantigua was seen today for back pain. Diagnoses and all orders for this visit:    Fall from slip, trip, or stumble, initial encounter    Rib pain on left side  -     XR RIBS LEFT INCLUDE CHEST (MIN 3 VIEWS); Future      Due to the pain she is experiencing secondary to the fall, I did have x-rays taken today. I did review the films. Formal report revealed no evidence of acute fracture or dislocation. I did not see evidence either. Treatment Plan: Contusion of ribs. With her pain level today and tenderness to palpation, I do not feel manipulation would be of benefit. However, with her consent and going to use EMS to the interscapular region to modulate pain. This was performed in a seated position today for 15 minutes. She tolerated it well. I did offer her express care for pain relief, or she can see her PCP Dr. Jonah Chappell. She is going to see express today since she is here.         I will see her back PRN        Seen By:  Ceci Hodges DC

## 2021-02-25 NOTE — PROGRESS NOTES
21  [facility] Covert : 1985 Sex: female  Age 28 y.o. Subjective:  Chief Complaint   Patient presents with    Fall     rib and back pain          HPI:   Sunday Eduardo Covert , 28 y.o. female presents to [facility] care for evaluation of left-sided rib pain. The patient states that she slipped and fell and injured her left rib area. The patient fell and hit the ground. The patient states that her left arm was outstretched and she landed directly on the left chest area. The patient was with chiropractor today and they ordered x-ray of the ribs and did not show any evidence of acute process. The patient is here for essentially pain management. She has been taking some over-the-counter medications without relief. Not having any shortness of breath. This was a mechanical fall. ROS:   Unless otherwise stated in this report the patient's positive and negative responses for review of systems for constitutional, eyes, ENT, cardiovascular, respiratory, gastrointestinal, neurological, , musculoskeletal, and integument systems and related systems to the presenting problem are either stated in the history of present illness or were not pertinent or were negative for the symptoms and/or complaints related to the presenting medical problem. Positives and pertinent negatives as per HPI. All others reviewed and are negative.       PMH:     Past Medical History:   Diagnosis Date    Allergic rhinitis     history    Anesthesia complication     pulse ox dropped    Arthritis     Chronic sinusitis     Chronic tension-type headache, intractable 2020    Constipation     Depression with anxiety     Folic acid deficiency     Migraine headache 2020    Morbidly obese (HCC)     PCOS (polycystic ovarian syndrome) 2020    Prolonged emergence from general anesthesia     RLS (restless legs syndrome)     Vitamin D deficiency 2020       Past Surgical History: Procedure Laterality Date    ACHILLES TENDON SURGERY Right 12/21/2020    FUSION FIRST TARSOMETATARSAL JOINT RIGHT TENDO ACHILLES TENDON LENGTHENING RIGHT performed by Monse Brock DPM at [address]  [address] [place] [address]  2006    CHOLECYSTECTOMY  2009    COLONOSCOPY      DILATION AND CURETTAGE OF UTERUS  2013    HYSTEROSCOPY  2/4/2015    [facility]   LAPAROSCOPY    SAJI-EN-Y GASTRIC BYPASS N/A 6/30/2020    GASTRIC SLEEVE CONVERT TO  SAJI-EN-Y LAPAROSCOPIC performed by Yaima Khan MD at [facility]  2017    [facility]    TUBAL LIGATION  2014    Abalation    UPPER GASTROINTESTINAL ENDOSCOPY N/A 1/23/2020    EGD BIOPSY performed by Yaima Khan MD at [facility] History   Problem Relation Age of Onset    High Blood Pressure Mother     High Cholesterol Mother     Arthritis Father     No Known Problems Sister     Cancer Maternal Grandmother     COPD Maternal Grandmother     Diabetes Maternal Grandmother     Elevated Lipids Maternal Grandmother     Hypertension Maternal Grandmother     Heart Disease Maternal Grandfather     Cancer Maternal Grandfather     Diabetes Maternal Grandfather        Medications:     Current Outpatient Medications:     methylPREDNISolone (MEDROL DOSEPACK) 4 MG tablet, Take 1 kat by mouth as directed, Disp: 1 kit, Rfl: 0    propranolol (INDERAL) 10 MG tablet, Take 2 twice daily, migraine, Disp: 120 tablet, Rfl: 5    methylPREDNISolone (MEDROL DOSEPACK) 4 MG tablet, Take 1 kat by mouth as directed, Disp: 1 kit, Rfl: 0    Elagolix Sodium (ORILISSA) 200 MG TABS, Take 1 tablet by mouth 2 times daily, Disp: 60 tablet, Rfl: 0    vitamin D (ERGOCALCIFEROL) 1.25 MG (09325 UT) CAPS capsule, Take 1 capsule by mouth once a week, Disp: 12 capsule, Rfl: 0    zinc 50 MG CAPS, Take 50 mg by mouth See Admin Instructions Take Zinc Gluconate 50 mgs 1 tablet every Monday, Wednesday and Friday, Disp: 30 capsule, Rfl: 0   ondansetron (ZOFRAN ODT) 4 MG disintegrating tablet, Take 1 tablet by mouth every 8 hours as needed for Nausea or Vomiting, Disp: 15 tablet, Rfl: 0    Ferrous Sulfate (IRON PO), Take 1 tablet by mouth daily, Disp: , Rfl:     Calcium Carbonate (CALCI-CHEW PO), Take 3 tablets by mouth daily, Disp: , Rfl:     Elagolix Sodium (ORILISSA) 200 MG TABS, Take 1 tablet by mouth 2 times daily, Disp: 60 tablet, Rfl: 3    diclofenac sodium (VOLTAREN) 1 % GEL, Apply 2 g topically 4 times daily, Disp: 2 Tube, Rfl: 1    vitamin D (ERGOCALCIFEROL) 1.25 MG (78825 UT) CAPS capsule, Take 1 capsule by mouth Twice a Week, Disp: 24 capsule, Rfl: 1    SUMAtriptan (IMITREX) 50 MG tablet, TAKE 1/2 TABLET BY MOUTH ONCE FOR ONE DOSE AS NEEDED FOR MIGRAINE, Disp: 9 tablet, Rfl: 0    Multiple Vitamins-Minerals (MULTIVITAMIN ADULT PO), Take 1 tablet by mouth daily Indications: Bariatric Advantage Instructed to hold 5 days pre-op, Disp: , Rfl:     omeprazole (PRILOSEC) 20 MG delayed release capsule, Take 20 mg by mouth Daily Instructed to take morning of surgery with a sip of water, Disp: , Rfl:     aspirin 325 MG tablet, Take 1 tablet by mouth 2 times daily for 14 days Take by mouth twice daily, Disp: 28 tablet, Rfl: 0    Allergies: Allergies   Allergen Reactions    Ciprofloxacin Anaphylaxis       Social History:     Social History     Tobacco Use    Smoking status: Former Smoker     Packs/day: 0.50     Years: 22.00     Pack years: 11.00     Start date: 46     Quit date: 2015     Years since quittin.1    Smokeless tobacco: Never Used    Tobacco comment: quit smoking 2015   Substance Use Topics    Alcohol use: Not Currently    Drug use: No       Patient lives at home.     Physical Exam:     Vitals:    21 1143   BP: 114/70   Pulse: 69   Resp: 18   Temp: 97.3 °F (36.3 °C)   SpO2: 98%   Weight: 239 lb (108.4 kg)   Height: 5' 7\" (1.702 m)       Exam:  Physical Exam Nurses note and vital signs reviewed and patient is not hypoxic. General: The patient appears well and in no apparent distress. Patient is resting comfortably on cart. Skin: Warm, dry, no pallor noted. There is no rash noted. Head: Normocephalic, atraumatic. Eye: Normal conjunctiva  Ears, Nose, Mouth, and Throat: Oral mucosa is moist  Cardiovascular: Regular Rate and Rhythm  Respiratory: Patient is in no distress, no accessory muscle use, lungs are clear to auscultation, no wheezing, crackles or rhonchi. The patient has reproducible left lateral rib pain over the areas of ribs 7, 8, 9. The patient had no crepitus. No ecchymosis. The patient had symmetrical chest wall expansion. Back: Non-tender, no CVA tenderness bilaterally to percussion. GI: Normal bowel sounds, no tenderness to palpation, no masses appreciated. No rebound, guarding, or rigidity noted. Neurological: A&O x4, normal speech      Testing:     Xr Ribs Left Include Chest (min 3 Views)    Result Date: 2/25/2021  EXAMINATION: 5 XRAY VIEWS OF THE LEFT RIBS WITH FRONTAL XRAY VIEW OF THE CHEST 2/25/2021 9:56 am COMPARISON: 03/28/2020 HISTORY: ORDERING SYSTEM PROVIDED HISTORY: Rib pain on left side TECHNOLOGIST PROVIDED HISTORY: Reason for exam:->fall; pain with palpation, movement and breathing ~ ribs 3-4 left FINDINGS: The thoracic spine and chest wall appear unremarkable. Lung aeration and architecture is normal.  The pleural spaces are normal The heart and mediastinum appear normal. The ribs appear normal.    No of sign rib fracture, normal heart and lungs. Medical Decision Making:     Vital signs reviewed    Past medical history reviewed. Allergies reviewed. Medications reviewed. Patient on arrival does not appear to be in any apparent distress or discomfort. The patient had been sent for x-rays earlier and I personally reviewed the images and did not see any evidence of acute rib fracture, lung pathology. There is no evidence of effusion or evidence of pneumothorax. The patient has a history of gastric bypass and cannot use NSAIDs. We discussed differential diagnosis and management of her pain. The patient did consent to having intramuscular injections here. That she does not get much relief with a topical Lidoderm patch. The patient will be given a short course of Norco.  The patient will be given instructions for breathing exercises. The patient will also be given a prednisone tapering dose. The patient states that she is already on a Medrol Dosepak for a acute Achilles tendinitis. The patient will call with any questions or concerns. The patient will use ice to the affected area. Continue with the breathing exercises and follow-up with PCP      Clinical Impression:   Kareen Taveras was seen today for fall. Diagnoses and all orders for this visit:    Rib contusion, left, initial encounter  -     HYDROcodone-acetaminophen (NORCO) 5-325 MG per tablet; Take 1 tablet by mouth every 8 hours as needed for Pain for up to 3 days. Intended supply: 3 days. Take lowest dose possible to manage pain    Other orders  -     methocarbamol (ROBAXIN-750) 750 MG tablet;  Take 1 tablet by mouth 4 times daily for 10 days  -     methylPREDNISolone acetate (DEPO-MEDROL) injection 60 mg  -     ketorolac (TORADOL) injection 30 mg  -     predniSONE (DELTASONE) 20 MG tablet; 3 tablets once daily for 3 days, 2 tablets once daily for 3 days, one tablet once daily for 3 days The patient is to call for any concerns or return if any of the signs or symptoms worsen. The patient is to follow-up with PCP in the next 2-3 days for repeat evaluation repeat assessment or go directly to the emergency department.      SIGNATURE: Marianne Tim III, PA-C

## 2021-02-25 NOTE — PROGRESS NOTES
Patient in office for post op visit 7. Postop day of surgery 2020 fusion first tarsometatarsal joint right foot and tendo Achilles lengthening right lower leg.     Kathlyne Cogan Covert : 1985 Sex: female  Age: 28 y.o. Patient was referred by Nicole oPnce DO    CC:    Postop day of surgery 2020 fusion first tarsometatarsal joint right foot and tendo Achilles lengthening right lower leg  Follow-up fall right foot      HPI:   Postop day of surgery 2020 fusion first tarsometatarsal joint right foot and tendo Achilles lengthening right lower leg    Follow-up for right foot. Has been walking in the Cam walking boot. Pain-free today. No calf pain. Does have appointment with chiropractic care at 21 [phone] today. Has been tolerating Medrol Dosepak well. No additional pedal complaints today. No foot pain.       ROS:  Const: Denies constitutional symptoms  Musculo: Denies symptoms other than stated above  Skin: Denies symptoms other than stated above       Current Outpatient Medications:     methylPREDNISolone (MEDROL DOSEPACK) 4 MG tablet, Take 1 kat by mouth as directed, Disp: 1 kit, Rfl: 0    propranolol (INDERAL) 10 MG tablet, Take 2 twice daily, migraine, Disp: 120 tablet, Rfl: 5    methylPREDNISolone (MEDROL DOSEPACK) 4 MG tablet, Take 1 kat by mouth as directed, Disp: 1 kit, Rfl: 0    Elagolix Sodium (ORILISSA) 200 MG TABS, Take 1 tablet by mouth 2 times daily, Disp: 60 tablet, Rfl: 0    aspirin 325 MG tablet, Take 1 tablet by mouth 2 times daily for 14 days Take by mouth twice daily, Disp: 28 tablet, Rfl: 0    vitamin D (ERGOCALCIFEROL) 1.25 MG (87350 UT) CAPS capsule, Take 1 capsule by mouth once a week, Disp: 12 capsule, Rfl: 0    zinc 50 MG CAPS, Take 50 mg by mouth See Admin Instructions Take Zinc Gluconate 50 mgs 1 tablet every Monday, Wednesday and Friday, Disp: 30 capsule, Rfl: 0   ondansetron (ZOFRAN ODT) 4 MG disintegrating tablet, Take 1 tablet by mouth every 8 hours as needed for Nausea or Vomiting, Disp: 15 tablet, Rfl: 0    Ferrous Sulfate (IRON PO), Take 1 tablet by mouth daily, Disp: , Rfl:     Calcium Carbonate (CALCI-CHEW PO), Take 3 tablets by mouth daily, Disp: , Rfl:     Elagolix Sodium (ORILISSA) 200 MG TABS, Take 1 tablet by mouth 2 times daily, Disp: 60 tablet, Rfl: 3    diclofenac sodium (VOLTAREN) 1 % GEL, Apply 2 g topically 4 times daily, Disp: 2 Tube, Rfl: 1    vitamin D (ERGOCALCIFEROL) 1.25 MG (09506 UT) CAPS capsule, Take 1 capsule by mouth Twice a Week, Disp: 24 capsule, Rfl: 1    SUMAtriptan (IMITREX) 50 MG tablet, TAKE 1/2 TABLET BY MOUTH ONCE FOR ONE DOSE AS NEEDED FOR MIGRAINE, Disp: 9 tablet, Rfl: 0    Multiple Vitamins-Minerals (MULTIVITAMIN ADULT PO), Take 1 tablet by mouth daily Indications: Bariatric Advantage Instructed to hold 5 days pre-op, Disp: , Rfl:     omeprazole (PRILOSEC) 20 MG delayed release capsule, Take 20 mg by mouth Daily Instructed to take morning of surgery with a sip of water, Disp: , Rfl:   Allergies   Allergen Reactions    Ciprofloxacin Anaphylaxis       Past Medical History:   Diagnosis Date    Allergic rhinitis     history    Anesthesia complication     pulse ox dropped    Arthritis     Chronic sinusitis     Chronic tension-type headache, intractable 1/24/2020    Constipation     Depression with anxiety     Folic acid deficiency 0/02/8781    Migraine headache 1/24/2020    Morbidly obese (HCC)     PCOS (polycystic ovarian syndrome) 1/24/2020    Prolonged emergence from general anesthesia     RLS (restless legs syndrome)     Vitamin D deficiency 1/24/2020           Vitals:    02/25/21 0925   Weight: 239 lb (108.4 kg)   Height: 5' 7\" (1.702 m)       Work History/Social History: [employer]  Orthopedic history: MRI right ankle July 2020 rupture right posterior tibial tendon Focused Lower Extremity Physical Exam:    Neurovascular examination:    Dorsalis Pedis palpable bilateral.  Posterior tibialis palpable bilateral.    Capillary Refill Time:  Immediate return  Hair growth:  Symmetrical and bilateral   Skin:  Not atrophic  Edema: No significant edema insertion right Achilles. Neurologic:  Light touch intact bilateral.      Musculoskeletal/ Orthopedic examination:    Wiggling toes  No pain to palpation overlying incision dorsal right foot  No pain right foot   Negative Homans. Negative Moore. No pain insertion Achilles tendon. Dermatology examination:    Skin well coapted dorsal linear incision right medial foot. Skin well coapted posterior incisions right Achilles. Assessment and Plan:  Venkata Pollock was seen today for post-op check. Diagnoses and all orders for this visit:    Short Achilles tendon (acquired), right ankle    Injury of right Achilles tendon, subsequent encounter          Postop day of surgery 12/21/2020 fusion first tarsometatarsal joint right foot and tendo Achilles lengthening right lower leg    Pain-free today. Did have a fall earlier in the week and has been wearing the walking boot. I did recommend gradual transition out of the walking boot to regular well supported walking shoe over the next several days. Avoid barefoot. Any increase calf pain go to emergency room. As she was scheduled return to work this Monday. I would likely extend 1 additional week to be off of work. I will follow-up next Thursday hopefully return patient to work Monday, March 8, 2021. Return in about 1 week (around 3/4/2021). Seen By:  Alfonzo Briones DPM      Document was created using voice recognition software. Note was reviewed, however may contain grammatical errors.

## 2021-03-04 ENCOUNTER — OFFICE VISIT (OUTPATIENT)
Dept: PODIATRY | Age: 36
End: 2021-03-04

## 2021-03-04 DIAGNOSIS — M19.079 ARTHRITIS, MIDFOOT: ICD-10-CM

## 2021-03-04 DIAGNOSIS — S86.001D INJURY OF RIGHT ACHILLES TENDON, SUBSEQUENT ENCOUNTER: ICD-10-CM

## 2021-03-04 DIAGNOSIS — M67.01 SHORT ACHILLES TENDON (ACQUIRED), RIGHT ANKLE: Primary | ICD-10-CM

## 2021-03-04 PROCEDURE — 99024 POSTOP FOLLOW-UP VISIT: CPT | Performed by: PODIATRIST

## 2021-03-04 NOTE — PROGRESS NOTES
Patient in office for post op visit 9. Postop day of surgery 2020 fusion first tarsometatarsal joint right foot and tendo Achilles lengthening right lower leg. Xrays prior to appt. Real Justin Covert : 1985 Sex: female  Age: 28 y.o. Patient was referred by Stacey Solis DO    CC:    Postop day of surgery 2020 fusion first tarsometatarsal joint right foot and tendo Achilles lengthening right lower leg      HPI:   Postop day of surgery 2020 fusion first tarsometatarsal joint right foot and tendo Achilles lengthening right lower leg    Has been wearing regular shoes. No calf pain. Significant improvement since last visit following her fall. No pain Achilles tendon. No pain overlying incision. Did have repeat radiographs today.     ROS:  Const: Denies constitutional symptoms  Musculo: Denies symptoms other than stated above  Skin: Denies symptoms other than stated above       Current Outpatient Medications:     methocarbamol (ROBAXIN-750) 750 MG tablet, Take 1 tablet by mouth 4 times daily for 10 days, Disp: 40 tablet, Rfl: 0    predniSONE (DELTASONE) 20 MG tablet, 3 tablets once daily for 3 days, 2 tablets once daily for 3 days, one tablet once daily for 3 days, Disp: 18 tablet, Rfl: 0    methylPREDNISolone (MEDROL DOSEPACK) 4 MG tablet, Take 1 kat by mouth as directed, Disp: 1 kit, Rfl: 0    propranolol (INDERAL) 10 MG tablet, Take 2 twice daily, migraine, Disp: 120 tablet, Rfl: 5    methylPREDNISolone (MEDROL DOSEPACK) 4 MG tablet, Take 1 kat by mouth as directed, Disp: 1 kit, Rfl: 0    Elagolix Sodium (ORILISSA) 200 MG TABS, Take 1 tablet by mouth 2 times daily, Disp: 60 tablet, Rfl: 0    aspirin 325 MG tablet, Take 1 tablet by mouth 2 times daily for 14 days Take by mouth twice daily, Disp: 28 tablet, Rfl: 0    vitamin D (ERGOCALCIFEROL) 1.25 MG (58917 UT) CAPS capsule, Take 1 capsule by mouth once a week, Disp: 12 capsule, Rfl: 0    zinc 50 MG CAPS, Take 50 mg by mouth See Admin Instructions Take Zinc Gluconate 50 mgs 1 tablet every Monday, Wednesday and Friday, Disp: 30 capsule, Rfl: 0    ondansetron (ZOFRAN ODT) 4 MG disintegrating tablet, Take 1 tablet by mouth every 8 hours as needed for Nausea or Vomiting, Disp: 15 tablet, Rfl: 0    Ferrous Sulfate (IRON PO), Take 1 tablet by mouth daily, Disp: , Rfl:     Calcium Carbonate (CALCI-CHEW PO), Take 3 tablets by mouth daily, Disp: , Rfl:     Elagolix Sodium (ORILISSA) 200 MG TABS, Take 1 tablet by mouth 2 times daily, Disp: 60 tablet, Rfl: 3    diclofenac sodium (VOLTAREN) 1 % GEL, Apply 2 g topically 4 times daily, Disp: 2 Tube, Rfl: 1    vitamin D (ERGOCALCIFEROL) 1.25 MG (36678 UT) CAPS capsule, Take 1 capsule by mouth Twice a Week, Disp: 24 capsule, Rfl: 1    SUMAtriptan (IMITREX) 50 MG tablet, TAKE 1/2 TABLET BY MOUTH ONCE FOR ONE DOSE AS NEEDED FOR MIGRAINE, Disp: 9 tablet, Rfl: 0    Multiple Vitamins-Minerals (MULTIVITAMIN ADULT PO), Take 1 tablet by mouth daily Indications: Bariatric Advantage Instructed to hold 5 days pre-op, Disp: , Rfl:     omeprazole (PRILOSEC) 20 MG delayed release capsule, Take 20 mg by mouth Daily Instructed to take morning of surgery with a sip of water, Disp: , Rfl:   Allergies   Allergen Reactions    Ciprofloxacin Anaphylaxis       Past Medical History:   Diagnosis Date    Allergic rhinitis     history    Anesthesia complication     pulse ox dropped    Arthritis     Chronic sinusitis     Chronic tension-type headache, intractable 1/24/2020    Constipation     Depression with anxiety     Folic acid deficiency 4/55/3722    Migraine headache 1/24/2020    Morbidly obese (HCC)     PCOS (polycystic ovarian syndrome) 1/24/2020    Prolonged emergence from general anesthesia     RLS (restless legs syndrome)     Vitamin D deficiency 1/24/2020           There were no vitals filed for this visit.     Work History/Social History: [employer]  Orthopedic history: MRI right ankle July 2020 rupture right posterior tibial tendon    Focused Lower Extremity Physical Exam:    Neurovascular examination:    Dorsalis Pedis palpable bilateral.  Posterior tibialis palpable bilateral.    Capillary Refill Time:  Immediate return  Hair growth:  Symmetrical and bilateral   Skin:  Not atrophic  Edema: No significant edema insertion right Achilles. Neurologic:  Light touch intact bilateral.      Musculoskeletal/ Orthopedic examination:    Wiggling toes  No pain to palpation overlying incision dorsal right foot  No pain right foot   Negative Homans. Negative Moore. There is no pain insertion Achilles tendon. No pain first tarsometatarsal joint right foot. Dermatology examination:    Skin well coapted dorsal linear incision right medial foot. Skin well coapted posterior incisions right Achilles. Assessment and Plan:  Alma Lee was seen today for post-op check. Diagnoses and all orders for this visit:    Short Achilles tendon (acquired), right ankle  -     XR FOOT RIGHT (MIN 3 VIEWS); Future    Injury of right Achilles tendon, subsequent encounter    Arthritis, midfoot          Postop day of surgery 12/21/2020 fusion first tarsometatarsal joint right foot and tendo Achilles lengthening right lower leg    Radiographs reviewed right foot. Plate and screw fixation following first tarsometatarsal joint arthrodesis intact. Pain-free progressing well. Continue regular well supported walking shoes. Avoid barefoot. Return to work Monday, March 15, 2021. I will follow-up 1 month. Return in about 1 month (around 4/4/2021). Seen By:  Kathie Silverman DPM      Document was created using voice recognition software. Note was reviewed, however may contain grammatical errors.

## 2021-03-04 NOTE — LETTER
3/4/2021        I saw Caleb Villa Covert postoperative follow-up from surgery right foot day of surgery 12/21/2020. Progressing well. Return to work full activity no restrictions Monday, March 15, 2021    Call anytime with any questions or concerns.         Sincerely,  Alexa Santos DPM      [address]  [address] [place], [facility] [address] [address]  Phone: [phone]  Fax: [phone]

## 2021-03-10 ENCOUNTER — OFFICE VISIT (OUTPATIENT)
Dept: CHIROPRACTIC MEDICINE | Age: 36
End: 2021-03-10
Payer: COMMERCIAL

## 2021-03-10 VITALS
OXYGEN SATURATION: 96 % | HEART RATE: 86 BPM | RESPIRATION RATE: 16 BRPM | BODY MASS INDEX: 37.51 KG/M2 | HEIGHT: 67 IN | TEMPERATURE: 98.3 F | WEIGHT: 239 LBS

## 2021-03-10 DIAGNOSIS — M62.830 MUSCLE SPASM OF BACK: ICD-10-CM

## 2021-03-10 DIAGNOSIS — M54.04 PANNICULITIS AFFECTING REGIONS OF NECK AND BACK, THORACIC REGION: Primary | ICD-10-CM

## 2021-03-10 DIAGNOSIS — M54.2 CERVICALGIA: ICD-10-CM

## 2021-03-10 PROCEDURE — 97014 ELECTRIC STIMULATION THERAPY: CPT | Performed by: CHIROPRACTOR

## 2021-03-10 PROCEDURE — 98940 CHIROPRACT MANJ 1-2 REGIONS: CPT | Performed by: CHIROPRACTOR

## 2021-03-10 NOTE — PROGRESS NOTES
3/10/21  [facility] Covert : 1985 Sex: female  Age: 28 y.o. Chief Complaint   Patient presents with    Back Pain       HPI:   Negrita Olivia is doing much better compared to her last visit. She did do some muscle relaxers and a couple of pain pills which significantly helped. Today she is reporting 2/10 middle back pain identified in the interscapular region with some tightness and stiffness primarily on the left side of her neck. No new issues otherwise.         Current Outpatient Medications:     predniSONE (DELTASONE) 20 MG tablet, 3 tablets once daily for 3 days, 2 tablets once daily for 3 days, one tablet once daily for 3 days, Disp: 18 tablet, Rfl: 0    methylPREDNISolone (MEDROL DOSEPACK) 4 MG tablet, Take 1 kat by mouth as directed, Disp: 1 kit, Rfl: 0    propranolol (INDERAL) 10 MG tablet, Take 2 twice daily, migraine, Disp: 120 tablet, Rfl: 5    methylPREDNISolone (MEDROL DOSEPACK) 4 MG tablet, Take 1 kat by mouth as directed, Disp: 1 kit, Rfl: 0    Elagolix Sodium (ORILISSA) 200 MG TABS, Take 1 tablet by mouth 2 times daily, Disp: 60 tablet, Rfl: 0    aspirin 325 MG tablet, Take 1 tablet by mouth 2 times daily for 14 days Take by mouth twice daily, Disp: 28 tablet, Rfl: 0    vitamin D (ERGOCALCIFEROL) 1.25 MG (94622 UT) CAPS capsule, Take 1 capsule by mouth once a week, Disp: 12 capsule, Rfl: 0    zinc 50 MG CAPS, Take 50 mg by mouth See Admin Instructions Take Zinc Gluconate 50 mgs 1 tablet every Monday, Wednesday and Friday, Disp: 30 capsule, Rfl: 0    ondansetron (ZOFRAN ODT) 4 MG disintegrating tablet, Take 1 tablet by mouth every 8 hours as needed for Nausea or Vomiting, Disp: 15 tablet, Rfl: 0    Ferrous Sulfate (IRON PO), Take 1 tablet by mouth daily, Disp: , Rfl:     Calcium Carbonate (CALCI-CHEW PO), Take 3 tablets by mouth daily, Disp: , Rfl:     Elagolix Sodium (ORILISSA) 200 MG TABS, Take 1 tablet by mouth 2 times daily, Disp: 60 tablet, Rfl: 3    diclofenac sodium (VOLTAREN) 1 % GEL, Apply 2 g topically 4 times daily, Disp: 2 Tube, Rfl: 1    vitamin D (ERGOCALCIFEROL) 1.25 MG (81350 UT) CAPS capsule, Take 1 capsule by mouth Twice a Week, Disp: 24 capsule, Rfl: 1    SUMAtriptan (IMITREX) 50 MG tablet, TAKE 1/2 TABLET BY MOUTH ONCE FOR ONE DOSE AS NEEDED FOR MIGRAINE, Disp: 9 tablet, Rfl: 0    Multiple Vitamins-Minerals (MULTIVITAMIN ADULT PO), Take 1 tablet by mouth daily Indications: Bariatric Advantage Instructed to hold 5 days pre-op, Disp: , Rfl:     omeprazole (PRILOSEC) 20 MG delayed release capsule, Take 20 mg by mouth Daily Instructed to take morning of surgery with a sip of water, Disp: , Rfl:     Exam:   Vitals:    03/10/21 0903   Pulse: 86   Resp: 16   Temp: 98.3 °F (36.8 °C)   SpO2: 96%       Active trigger point left levator scapulae today. There are hypertonic and tender fibers noted today in the cervical and thoracic paraspinal muscles. There is some tenderness with palpation of the costotransverse joints on the left at T7-8. Joint fixation is noted with motion screening at C4-6, T3-5, T7-9. Natalie Lux was seen today for back pain. Diagnoses and all orders for this visit:    Panniculitis affecting regions of neck and back, thoracic region    Muscle spasm of back    Cervicalgia        Treatment Plan:  EMS with heat to the interscapular region for 15 minutes to address muscle spasm/hypertension and alleviate pain. Diversified manipulation was then performed to the affected segments in the cervical and thoracic regions. Tolerated well.   Follow-up 1 week for continued care      Seen By:  Jorge Rahman

## 2021-03-17 ENCOUNTER — OFFICE VISIT (OUTPATIENT)
Dept: CHIROPRACTIC MEDICINE | Age: 36
End: 2021-03-17
Payer: COMMERCIAL

## 2021-03-17 VITALS
WEIGHT: 239 LBS | OXYGEN SATURATION: 97 % | HEART RATE: 70 BPM | RESPIRATION RATE: 16 BRPM | HEIGHT: 67 IN | TEMPERATURE: 98 F | BODY MASS INDEX: 37.51 KG/M2

## 2021-03-17 DIAGNOSIS — M62.830 MUSCLE SPASM OF BACK: ICD-10-CM

## 2021-03-17 DIAGNOSIS — M54.2 CERVICALGIA: ICD-10-CM

## 2021-03-17 DIAGNOSIS — M54.04 PANNICULITIS AFFECTING REGIONS OF NECK AND BACK, THORACIC REGION: Primary | ICD-10-CM
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
## 2021-03-17 PROCEDURE — 97014 ELECTRIC STIMULATION THERAPY: CPT | Performed by: CHIROPRACTOR

## 2021-03-17 PROCEDURE — 98940 CHIROPRACT MANJ 1-2 REGIONS: CPT | Performed by: CHIROPRACTOR

## 2021-03-17 NOTE — PROGRESS NOTES
3/17/21  [facility] Randall Covert : 1985 Sex: female  Age: 28 y.o. Chief Complaint   Patient presents with    Back Pain       HPI:   Pain is has improved. On average, pain is perceived as mild (1  pain scale). Change in quality of symptoms: no.  She denies any other symptoms.             Current Outpatient Medications:     predniSONE (DELTASONE) 20 MG tablet, 3 tablets once daily for 3 days, 2 tablets once daily for 3 days, one tablet once daily for 3 days, Disp: 18 tablet, Rfl: 0    methylPREDNISolone (MEDROL DOSEPACK) 4 MG tablet, Take 1 kat by mouth as directed, Disp: 1 kit, Rfl: 0    propranolol (INDERAL) 10 MG tablet, Take 2 twice daily, migraine, Disp: 120 tablet, Rfl: 5    methylPREDNISolone (MEDROL DOSEPACK) 4 MG tablet, Take 1 kat by mouth as directed, Disp: 1 kit, Rfl: 0    Elagolix Sodium (ORILISSA) 200 MG TABS, Take 1 tablet by mouth 2 times daily, Disp: 60 tablet, Rfl: 0    aspirin 325 MG tablet, Take 1 tablet by mouth 2 times daily for 14 days Take by mouth twice daily, Disp: 28 tablet, Rfl: 0    vitamin D (ERGOCALCIFEROL) 1.25 MG (10555 UT) CAPS capsule, Take 1 capsule by mouth once a week, Disp: 12 capsule, Rfl: 0    zinc 50 MG CAPS, Take 50 mg by mouth See Admin Instructions Take Zinc Gluconate 50 mgs 1 tablet every Monday, Wednesday and Friday, Disp: 30 capsule, Rfl: 0    ondansetron (ZOFRAN ODT) 4 MG disintegrating tablet, Take 1 tablet by mouth every 8 hours as needed for Nausea or Vomiting, Disp: 15 tablet, Rfl: 0    Ferrous Sulfate (IRON PO), Take 1 tablet by mouth daily, Disp: , Rfl:     Calcium Carbonate (CALCI-CHEW PO), Take 3 tablets by mouth daily, Disp: , Rfl:     Elagolix Sodium (ORILISSA) 200 MG TABS, Take 1 tablet by mouth 2 times daily, Disp: 60 tablet, Rfl: 3    diclofenac sodium (VOLTAREN) 1 % GEL, Apply 2 g topically 4 times daily, Disp: 2 Tube, Rfl: 1    vitamin D (ERGOCALCIFEROL) 1.25 MG (55343 UT) CAPS capsule, Take 1 capsule by mouth Twice a Week, Disp: 24 capsule, Rfl: 1    SUMAtriptan (IMITREX) 50 MG tablet, TAKE 1/2 TABLET BY MOUTH ONCE FOR ONE DOSE AS NEEDED FOR MIGRAINE, Disp: 9 tablet, Rfl: 0    Multiple Vitamins-Minerals (MULTIVITAMIN ADULT PO), Take 1 tablet by mouth daily Indications: Bariatric Advantage Instructed to hold 5 days pre-op, Disp: , Rfl:     omeprazole (PRILOSEC) 20 MG delayed release capsule, Take 20 mg by mouth Daily Instructed to take morning of surgery with a sip of water, Disp: , Rfl:     Exam:   Vitals:    03/17/21 0827   Pulse: 70   Resp: 16   Temp: 98 °F (36.7 °C)   SpO2: 97%         There are hypertonic and tender fibers noted today in the cervical and thoracic paraspinal muscles. Joint fixation is noted with motion screening at T7-8: C7-T1. Marie Esparza was seen today for back pain. Diagnoses and all orders for this visit:    Panniculitis affecting regions of neck and back, thoracic region    Muscle spasm of back    Cervicalgia    Fall from slip, trip, or stumble, initial encounter        Treatment Plan:  EMS with heat to the lower thoracic region for 15 minutes to address muscle spasm/hypertension and alleviate pain. Diversified manipulation to the listed cervical, thoracic segments. Tolerated well. This point she has minimal pain and is feeling near her preepisode status. She can follow-up with me as needed for further care.       Seen By:  Angelina Flower DC

## 2021-03-19 ENCOUNTER — OFFICE VISIT (OUTPATIENT)
Dept: FAMILY MEDICINE CLINIC | Age: 36
End: 2021-03-19
Payer: COMMERCIAL

## 2021-03-19 VITALS
OXYGEN SATURATION: 98 % | HEART RATE: 86 BPM | DIASTOLIC BLOOD PRESSURE: 64 MMHG | SYSTOLIC BLOOD PRESSURE: 90 MMHG | TEMPERATURE: 97.3 F

## 2021-03-19 DIAGNOSIS — M46.1 SACROILIITIS (HCC): Primary | ICD-10-CM

## 2021-03-19 PROCEDURE — 20552 NJX 1/MLT TRIGGER POINT 1/2: CPT | Performed by: FAMILY MEDICINE

## 2021-03-19 RX ORDER — METHYLPREDNISOLONE ACETATE 40 MG/ML
40 INJECTION, SUSPENSION INTRA-ARTICULAR; INTRALESIONAL; INTRAMUSCULAR; SOFT TISSUE ONCE
Status: COMPLETED | OUTPATIENT
Start: 2021-03-19 | End: 2021-03-19

## 2021-03-19 RX ORDER — TIZANIDINE 4 MG/1
4 TABLET ORAL NIGHTLY PRN
Qty: 10 TABLET | Refills: 1 | Status: SHIPPED
Start: 2021-03-19 | End: 2021-04-23

## 2021-03-19 RX ORDER — LIDOCAINE HYDROCHLORIDE 10 MG/ML
20 INJECTION, SOLUTION INFILTRATION; PERINEURAL ONCE
Status: COMPLETED | OUTPATIENT
Start: 2021-03-19 | End: 2021-03-19

## 2021-03-19 RX ADMIN — LIDOCAINE HYDROCHLORIDE 20 ML: 10 INJECTION, SOLUTION INFILTRATION; PERINEURAL at 17:30

## 2021-03-19 RX ADMIN — METHYLPREDNISOLONE ACETATE 40 MG: 40 INJECTION, SUSPENSION INTRA-ARTICULAR; INTRALESIONAL; INTRAMUSCULAR; SOFT TISSUE at 17:30

## 2021-03-19 NOTE — PROGRESS NOTES
OFFICE NOTE    3/19/21  Name: Jj Orellana Covert  :1985   Sex:female   Age:35 y.o. SUBJECTIVE  Chief Complaint   Patient presents with    Back Pain       HPI Back pain for several weeks. Slipped and fell outside twisting back as she fell. Feels some pain in her buttocks and proximal thrigh on right  Bilateral SI pain. Has been to Dr. Emilia Archibald and had some success  Review of Systems   No bowel or bladder complaints. No weakness.  No radicular symptoms below knee    Current Outpatient Medications:     tiZANidine (ZANAFLEX) 4 MG tablet, Take 1 tablet by mouth nightly as needed (low back pain), Disp: 10 tablet, Rfl: 1    Elagolix Sodium (ORILISSA) 200 MG TABS, Take 1 tablet by mouth 2 times daily, Disp: 60 tablet, Rfl: 3    propranolol (INDERAL) 10 MG tablet, Take 2 twice daily, migraine, Disp: 120 tablet, Rfl: 5    vitamin D (ERGOCALCIFEROL) 1.25 MG (63660 UT) CAPS capsule, Take 1 capsule by mouth once a week, Disp: 12 capsule, Rfl: 0    Calcium Carbonate (CALCI-CHEW PO), Take 3 tablets by mouth daily, Disp: , Rfl:     vitamin D (ERGOCALCIFEROL) 1.25 MG (24180 UT) CAPS capsule, Take 1 capsule by mouth Twice a Week, Disp: 24 capsule, Rfl: 1    SUMAtriptan (IMITREX) 50 MG tablet, TAKE 1/2 TABLET BY MOUTH ONCE FOR ONE DOSE AS NEEDED FOR MIGRAINE, Disp: 9 tablet, Rfl: 0    omeprazole (PRILOSEC) 20 MG delayed release capsule, Take 20 mg by mouth Daily Instructed to take morning of surgery with a sip of water, Disp: , Rfl:   Allergies   Allergen Reactions    Ciprofloxacin Anaphylaxis       Past Medical History:   Diagnosis Date    Allergic rhinitis     history    Anesthesia complication     pulse ox dropped    Arthritis     Chronic sinusitis     Chronic tension-type headache, intractable 2020    Constipation     Depression with anxiety     Folic acid deficiency     Migraine headache 2020    Morbidly obese (HCC)     PCOS SpO2: 98%        There is no height or weight on file to calculate BMI. Orders Placed This Encounter   Procedures    TN ARTHROCENTESIS ASPIR&/INJ INTERM JT/BURS W/O US        EXAM   Physical Exam  Vitals signs and nursing note reviewed. Constitutional:       Appearance: Normal appearance. Musculoskeletal: Normal range of motion. General: Tenderness present. Right lower leg: No edema. Left lower leg: No edema. Comments: Bilateral SI pain. Considerably worse on right. Negative SLRs. ROM hips normal. Trochanteric bursa not particularly tender   Neurological:      Mental Status: She is alert. Artemio Tineo was seen today for back pain. Diagnoses and all orders for this visit:    Sacroiliitis ([address]  -     TN ARTHROCENTESIS ASPIR&/INJ INTERM JT/BURS W/O US  -     methylPREDNISolone acetate (DEPO-MEDROL) injection 40 mg  -     lidocaine 1 % injection 20 mL    Other orders  -     tiZANidine (ZANAFLEX) 4 MG tablet; Take 1 tablet by mouth nightly as needed (low back pain)    Injected right SI joint aseptically with above solution with out issues. Thermocare pad, Tizanadine at hs. Can continue chiropractic       No follow-ups on file.     Electronically signed by Gabriel Oteor MD on 3/19/21 at 5:04 PM EDT

## 2021-03-26 ENCOUNTER — IMMUNIZATION (OUTPATIENT)
Dept: PRIMARY CARE CLINIC | Age: 36
End: 2021-03-26
Payer: COMMERCIAL

## 2021-03-26 PROCEDURE — 0011A COVID-19, MODERNA VACCINE 100MCG/0.5ML DOSE: CPT | Performed by: PHYSICIAN ASSISTANT

## 2021-03-26 PROCEDURE — 91301 COVID-19, MODERNA VACCINE 100MCG/0.5ML DOSE: CPT | Performed by: PHYSICIAN ASSISTANT

## 2021-04-05 ENCOUNTER — OFFICE VISIT (OUTPATIENT)
Dept: FAMILY MEDICINE CLINIC | Age: 36
End: 2021-04-05
Payer: COMMERCIAL

## 2021-04-05 VITALS
DIASTOLIC BLOOD PRESSURE: 62 MMHG | BODY MASS INDEX: 36.65 KG/M2 | WEIGHT: 234 LBS | SYSTOLIC BLOOD PRESSURE: 100 MMHG | OXYGEN SATURATION: 98 % | HEART RATE: 76 BPM | TEMPERATURE: 97.3 F

## 2021-04-05 DIAGNOSIS — U07.1 COVID-19: Primary | ICD-10-CM

## 2021-04-05 LAB
Lab: ABNORMAL
PERFORMING INSTRUMENT: ABNORMAL
QC PASS/FAIL: ABNORMAL
SARS-COV-2, POC: DETECTED

## 2021-04-05 PROCEDURE — 99213 OFFICE O/P EST LOW 20 MIN: CPT | Performed by: FAMILY MEDICINE

## 2021-04-05 PROCEDURE — 87426 SARSCOV CORONAVIRUS AG IA: CPT | Performed by: FAMILY MEDICINE

## 2021-04-05 ASSESSMENT — ENCOUNTER SYMPTOMS
RESPIRATORY NEGATIVE: 1
SINUS PRESSURE: 1
SINUS PAIN: 1
EYES NEGATIVE: 1
RHINORRHEA: 1

## 2021-04-07 LAB
SARS-COV-2: DETECTED
SOURCE: ABNORMAL

## 2021-04-20 ENCOUNTER — OFFICE VISIT (OUTPATIENT)
Dept: PODIATRY | Age: 36
End: 2021-04-20
Payer: COMMERCIAL

## 2021-04-20 ENCOUNTER — OFFICE VISIT (OUTPATIENT)
Dept: CHIROPRACTIC MEDICINE | Age: 36
End: 2021-04-20
Payer: COMMERCIAL

## 2021-04-20 VITALS
BODY MASS INDEX: 36.73 KG/M2 | HEART RATE: 79 BPM | HEIGHT: 67 IN | RESPIRATION RATE: 16 BRPM | WEIGHT: 234 LBS | OXYGEN SATURATION: 96 % | TEMPERATURE: 97.9 F

## 2021-04-20 VITALS — HEIGHT: 67 IN | WEIGHT: 234 LBS | BODY MASS INDEX: 36.73 KG/M2

## 2021-04-20 DIAGNOSIS — M54.04 PANNICULITIS AFFECTING REGIONS OF NECK AND BACK, THORACIC REGION: Primary | ICD-10-CM

## 2021-04-20 DIAGNOSIS — M62.830 MUSCLE SPASM OF BACK: ICD-10-CM

## 2021-04-20 DIAGNOSIS — M19.079 ARTHRITIS, MIDFOOT: ICD-10-CM

## 2021-04-20 DIAGNOSIS — S86.001D INJURY OF RIGHT ACHILLES TENDON, SUBSEQUENT ENCOUNTER: Primary | ICD-10-CM

## 2021-04-20 DIAGNOSIS — M54.2 CERVICALGIA: ICD-10-CM

## 2021-04-20 PROCEDURE — 99213 OFFICE O/P EST LOW 20 MIN: CPT | Performed by: PODIATRIST

## 2021-04-20 PROCEDURE — 97014 ELECTRIC STIMULATION THERAPY: CPT | Performed by: CHIROPRACTOR

## 2021-04-20 PROCEDURE — 98940 CHIROPRACT MANJ 1-2 REGIONS: CPT | Performed by: CHIROPRACTOR

## 2021-04-20 NOTE — PROGRESS NOTES
21  [facility] Randall Covert : 1985 Sex: female  Age: 28 y.o. Chief Complaint   Patient presents with    Back Pain       HPI:   No new issues. She is reporting an increase in her mid to lower back pain of late secondary to some increased physical activities around the house to include yanira and moving some down log/tree. No rating pain to extremities. No changes otherwise. Current Outpatient Medications:     Elagolix Sodium (ORILISSA) 200 MG TABS, Take 1 tablet by mouth 2 times daily, Disp: 60 tablet, Rfl: 3    tiZANidine (ZANAFLEX) 4 MG tablet, Take 1 tablet by mouth nightly as needed (low back pain), Disp: 10 tablet, Rfl: 1    propranolol (INDERAL) 10 MG tablet, Take 2 twice daily, migraine, Disp: 120 tablet, Rfl: 5    vitamin D (ERGOCALCIFEROL) 1.25 MG (74806 UT) CAPS capsule, Take 1 capsule by mouth once a week, Disp: 12 capsule, Rfl: 0    Calcium Carbonate (CALCI-CHEW PO), Take 3 tablets by mouth daily, Disp: , Rfl:     vitamin D (ERGOCALCIFEROL) 1.25 MG (76777 UT) CAPS capsule, Take 1 capsule by mouth Twice a Week, Disp: 24 capsule, Rfl: 1    SUMAtriptan (IMITREX) 50 MG tablet, TAKE 1/2 TABLET BY MOUTH ONCE FOR ONE DOSE AS NEEDED FOR MIGRAINE, Disp: 9 tablet, Rfl: 0    omeprazole (PRILOSEC) 20 MG delayed release capsule, Take 20 mg by mouth Daily Instructed to take morning of surgery with a sip of water, Disp: , Rfl:     Exam:   Vitals:    21 1404   Pulse: 79   Resp: 16   Temp: 97.9 °F (36.6 °C)   SpO2: 96%         There are hypertonic and tender fibers noted today in the bilateral thoracic, cervical paraspinal muscles. Joint fixation is noted with motion screening at C5-6, T3-5, T7-9    Junito Castillo was seen today for back pain.     Diagnoses and all orders for this visit:    Panniculitis affecting regions of neck and back, thoracic region    Muscle spasm of back    Cervicalgia        Treatment Plan:  EMS with heat to the lower thoracic region for 15 minutes to address

## 2021-04-20 NOTE — PROGRESS NOTES
Postop day of surgery 12/21/2020 fusion first tarsometatarsal joint right foot and tendo Achilles lengthening right lower leg    I did review three-view weightbearing radiographs right foot. Plate and screw overlying first tarsometatarsal joint intact. No sign of hardware loosening. No screw breakage noted. We did discuss power step orthotics. Has been working on her feet pain-free. Progressing well. Importance of avoiding barefoot. Any new pain or any new injuries come in sooner. Progressing well from foot ankle standpoint. I will follow-up 6 months. Return in about 2 months (around 6/20/2021). Seen By:  Barb Landis DPM      Document was created using voice recognition software. Note was reviewed, however may contain grammatical errors.

## 2021-04-23 ENCOUNTER — OFFICE VISIT (OUTPATIENT)
Dept: NEUROLOGY | Age: 36
End: 2021-04-23
Payer: COMMERCIAL

## 2021-04-23 VITALS — WEIGHT: 234 LBS | DIASTOLIC BLOOD PRESSURE: 50 MMHG | BODY MASS INDEX: 36.65 KG/M2 | SYSTOLIC BLOOD PRESSURE: 100 MMHG

## 2021-04-23 DIAGNOSIS — G43.709 CHRONIC MIGRAINE WITHOUT AURA WITHOUT STATUS MIGRAINOSUS, NOT INTRACTABLE: Primary | ICD-10-CM

## 2021-04-23 DIAGNOSIS — G44.89 CHRONIC MIXED HEADACHE SYNDROME: ICD-10-CM

## 2021-04-23 PROCEDURE — 99214 OFFICE O/P EST MOD 30 MIN: CPT | Performed by: PSYCHIATRY & NEUROLOGY

## 2021-04-23 RX ORDER — ELETRIPTAN HYDROBROMIDE 40 MG/1
TABLET, FILM COATED ORAL
Qty: 6 TABLET | Refills: 3 | Status: SHIPPED
Start: 2021-04-23 | End: 2022-02-23

## 2021-04-23 RX ORDER — TOPIRAMATE 25 MG/1
TABLET ORAL
Qty: 124 TABLET | Refills: 5 | Status: SHIPPED | OUTPATIENT
Start: 2021-04-23

## 2021-04-23 ASSESSMENT — ENCOUNTER SYMPTOMS
EYES NEGATIVE: 1
GASTROINTESTINAL NEGATIVE: 1
RESPIRATORY NEGATIVE: 1

## 2021-04-23 NOTE — PROGRESS NOTES
Neurology Progress Note, Follow-up:    Patient: Nel Arguello Covert  : 1985  Date: 21  Primary provider: Harry Fraser DO     Re: Followup, migraine without aura, migraine exacerbation. Dear Harry Fraser DO:    Josy Hutton presents for a follow-up appointment regarding history of migraine headaches next headache pain syndrome without a visual aura for the past 2 years. She reports worsening of migraines in intensity and frequency. She has a lower blood pressure reading today, thus propranolol cannot be titrated higher and is discontinued. We discussed a trial of topiramate starting 25 mg twice daily for migraine prophylaxis and change from sumatriptan as a rescue headache medication to a trial of Relpax 40 mg strength tablet at the onset of the migraine or breakthrough migraine    Please refer to prior Neurology consult letter of 2020 for additional information if needed. ROS, family/social history, medication/allergy list: Reviewed, updated. Current Outpatient Medications   Medication Sig Dispense Refill    Elagolix Sodium (ORILISSA) 200 MG TABS Take 1 tablet by mouth 2 times daily 60 tablet 3    vitamin D (ERGOCALCIFEROL) 1.25 MG (31144 UT) CAPS capsule Take 1 capsule by mouth once a week 12 capsule 0    Calcium Carbonate (CALCI-CHEW PO) Take 3 tablets by mouth daily      vitamin D (ERGOCALCIFEROL) 1.25 MG (52386 UT) CAPS capsule Take 1 capsule by mouth Twice a Week 24 capsule 1    SUMAtriptan (IMITREX) 50 MG tablet TAKE 1/2 TABLET BY MOUTH ONCE FOR ONE DOSE AS NEEDED FOR MIGRAINE 9 tablet 0    omeprazole (PRILOSEC) 20 MG delayed release capsule Take 20 mg by mouth Daily Instructed to take morning of surgery with a sip of water       No current facility-administered medications for this visit.         Allergies   Allergen Reactions    Ciprofloxacin Anaphylaxis       Patient Active Problem List   Diagnosis    Recurrent acute suppurative otitis media of right ear without spontaneous rupture of tympanic membrane    Acute otitis externa of right ear    Migraine headache    PCOS (polycystic ovarian syndrome)    Vitamin D deficiency    Folic acid deficiency    Chronic tension-type headache, intractable    Diarrhea    Non-intractable vomiting    Dysmenorrhea    Right ovarian cyst    GERD with esophagitis    Morbid obesity due to excess calories (HCC)    Disruption or dehiscence of closure of muscle or muscle flap       Past Medical History:   Diagnosis Date    Allergic rhinitis     history    Anesthesia complication     pulse ox dropped    Arthritis     Chronic sinusitis     Chronic tension-type headache, intractable 2020    Constipation     Depression with anxiety     Folic acid deficiency     Migraine headache 2020    Morbidly obese (HCC)     PCOS (polycystic ovarian syndrome) 2020    Prolonged emergence from general anesthesia     RLS (restless legs syndrome)     Vitamin D deficiency 2020       Past Surgical History:   Procedure Laterality Date    ACHILLES TENDON SURGERY Right 2020    FUSION FIRST TARSOMETATARSAL JOINT RIGHT TENDO ACHILLES TENDON LENGTHENING RIGHT performed by Audrey Eaton DPM at [address]       SECTION  2006    CHOLECYSTECTOMY      COLONOSCOPY      DILATION AND CURETTAGE OF UTERUS  2013    HYSTEROSCOPY  2015    [facility]   LAPAROSCOPY    SAJI-EN-Y GASTRIC BYPASS N/A 2020    GASTRIC SLEEVE CONVERT TO  SAJI-EN-Y LAPAROSCOPIC performed by Naresh Pereira MD at [facility]      [facility]    TUBAL LIGATION  2014    Abalation    UPPER GASTROINTESTINAL ENDOSCOPY N/A 2020    EGD BIOPSY performed by Naresh Pereira MD at [facility] ENDOSCOPY       Family History   Problem Relation Age of Onset    High Blood Pressure Mother     High Cholesterol Mother     Arthritis Father     No Known Problems Sister     Cancer Maternal Grandmother     COPD Maternal Grandmother     Diabetes Maternal Grandmother     Elevated Lipids Maternal Grandmother     Hypertension Maternal Grandmother     Heart Disease Maternal Grandfather     Cancer Maternal Grandfather     Diabetes Maternal Grandfather        Social History     Socioeconomic History    Marital status:      Spouse name: Not on file    Number of children: Not on file    Years of education: Not on file    Highest education level: Not on file   Occupational History    Not on file   Social Needs    Financial resource strain: Not on file    Food insecurity     Worry: Not on file     Inability: Not on file   [employer] needs     Medical: Not on file     Non-medical: Not on file   Tobacco Use    Smoking status: Former Smoker     Packs/day: 0.50     Years: 22.00     Pack years: 11.00     Start date:      Quit date: 2015     Years since quittin.2    Smokeless tobacco: Never Used    Tobacco comment: quit smoking 2015   Substance and Sexual Activity    Alcohol use: Not Currently    Drug use: No    Sexual activity: Not on file   Lifestyle    Physical activity     Days per week: Not on file     Minutes per session: Not on file    Stress: Not on file   Relationships    Social connections     Talks on phone: Not on file     Gets together: Not on file     Attends Spiritism service: Not on file     Active member of club or organization: Not on file     Attends meetings of clubs or organizations: Not on file     Relationship status: Not on file    Intimate partner violence     Fear of current or ex partner: Not on file     Emotionally abused: Not on file     Physically abused: Not on file     Forced sexual activity: Not on file   Other Topics Concern    Not on file   Social History Narrative    Not on file     Review of Systems   Constitutional: Negative. HENT: Negative. Eyes: Negative. Respiratory: Negative. Cardiovascular: Negative. Gastrointestinal: Negative. Endocrine: Negative. Genitourinary: Negative. Musculoskeletal: Positive for neck pain. Skin: Negative. Neurological: Positive for headaches. History of Arnold-Chiari 1 malformation   Hematological: Negative. Psychiatric/Behavioral: The patient is nervous/anxious. All other systems reviewed and are negative. Neurologic Exam:  BP (!) 100/50 (Site: Right Upper Arm, Position: Sitting, Cuff Size: Medium Adult)   Wt 234 lb (106.1 kg)   BMI 36.65 kg/m²    Mental Status: Alert, cooperative, well-nourished, well-groomed, oriented fully at baseline. Affect is appropriate and mood is anxious. CN's II-XII: Speech is clear and language fluent. Remains grossly intact throughout. Pupils are equal and reactive to light. EOMs are full without nystagmus. Visual fields are grossly full. Facial expression is decreased and facial sensation is normal.  Hearing is grossly intact. The tongue is midline. Motor/Sensory Exam: Grossly intact strength in the upper and lower extremities without tremor or drift and no focal subjective sensory deficits reported. No tremors observed. Coordination/Gait: No limb or gait incoordination. Assessment/Plan:   1. Episodic chronic migraine headaches not associated with a visual aura with recent worsening of migraines. 2.  History of mixed headache pain disorder. 3.  History of Arnold-Chiari 1 malformation. 4.  Migraine prophylaxis a trial of topiramate starting 25 mg twice daily was discussed and may be advanced every 2 weeks x 25 mg to 50 mg twice daily if clinically indicated and as tolerated. For rescue headache medication, a trial of Relpax 40 mg strength tablet at migraine onset was prescribed with no more than 1 tablet in a 24-hour period and limit of #6/month. Propranolol and Imitrex have been discontinued as were ineffective. 5.  Follow-up in the Neurology clinic in 4 weeks by means of virtual video visit preferred. Sincerely,      Noah Espinoza MD    Please note this report has been partially produced using speech recognition software and may cause contain errors related to that system including grammar, punctuation and spelling or words and phrases that may not seem appropriate. If there are questions or concerns please feel free to contact me to clarify.

## 2021-04-28 ENCOUNTER — OFFICE VISIT (OUTPATIENT)
Dept: FAMILY MEDICINE CLINIC | Age: 36
End: 2021-04-28
Payer: COMMERCIAL

## 2021-04-28 VITALS
TEMPERATURE: 97.9 F | SYSTOLIC BLOOD PRESSURE: 100 MMHG | WEIGHT: 232 LBS | HEIGHT: 67 IN | DIASTOLIC BLOOD PRESSURE: 62 MMHG | OXYGEN SATURATION: 98 % | HEART RATE: 86 BPM | BODY MASS INDEX: 36.41 KG/M2

## 2021-04-28 DIAGNOSIS — J01.90 ACUTE BACTERIAL SINUSITIS: Primary | ICD-10-CM

## 2021-04-28 DIAGNOSIS — B96.89 ACUTE BACTERIAL SINUSITIS: Primary | ICD-10-CM

## 2021-04-28 PROCEDURE — 99213 OFFICE O/P EST LOW 20 MIN: CPT | Performed by: FAMILY MEDICINE

## 2021-04-28 RX ORDER — CEFDINIR 300 MG/1
300 CAPSULE ORAL 2 TIMES DAILY
Qty: 14 CAPSULE | Refills: 0 | Status: SHIPPED
Start: 2021-04-28 | End: 2021-04-30

## 2021-04-28 RX ORDER — PSEUDOEPHEDRINE HCL 120 MG/1
120 TABLET, FILM COATED, EXTENDED RELEASE ORAL EVERY 12 HOURS
Qty: 30 TABLET | Refills: 0 | Status: SHIPPED
Start: 2021-04-28 | End: 2021-04-30

## 2021-04-28 RX ORDER — FLUCONAZOLE 150 MG/1
150 TABLET ORAL
Qty: 2 TABLET | Refills: 0 | Status: SHIPPED
Start: 2021-04-28 | End: 2021-04-30

## 2021-04-28 NOTE — PROGRESS NOTES
John Wall (:  1985) is a 39 y.o. female,Established patient, here for evaluation of the following chief complaint(s):  Sinusitis (3days)      ASSESSMENT/PLAN:  1. Acute bacterial sinusitis  -     cefdinir (OMNICEF) 300 MG capsule; Take 1 capsule by mouth 2 times daily for 7 days, Disp-14 capsule, R-0Normal  -     fluconazole (DIFLUCAN) 150 MG tablet; Take 1 tablet by mouth every 72 hours for 6 days, Disp-2 tablet, R-0Normal  -     pseudoephedrine (SUDAFED 12 HOUR) 120 MG extended release tablet; Take 1 tablet by mouth every 12 hours for 15 days, Disp-30 tablet, R-0Normal  This time we will treat empirically. Red flags discussed with patient. If any of these occur she is to go directly to the emergency department. No follow-ups on file. SUBJECTIVE/OBJECTIVE:  HPI  Presents today for several day history of worsening sinus congestion mild sore throat. Denies any fever or chills. Denies any known overt sick contact or recent travel. Denies any loss of taste or smell. Denies any nausea vomiting diarrhea. Denies any chest pain or shortness of breath. Review of Systems   Constitutional: Negative for fever. HENT: Positive for postnasal drip, rhinorrhea, sinus pressure, sinus pain, sneezing and sore throat. Negative for trouble swallowing. Eyes: Negative. Respiratory: Negative. Cardiovascular: Negative. Gastrointestinal: Negative. Musculoskeletal: Negative for neck pain. Skin: Negative for rash. Neurological: Negative for headaches. Hematological: Negative for adenopathy. All other systems reviewed and are negative.         Current Outpatient Medications:     cefdinir (OMNICEF) 300 MG capsule, Take 1 capsule by mouth 2 times daily for 7 days, Disp: 14 capsule, Rfl: 0    fluconazole (DIFLUCAN) 150 MG tablet, Take 1 tablet by mouth every 72 hours for 6 days, Disp: 2 tablet, Rfl: 0    pseudoephedrine (SUDAFED 12 HOUR) 120 MG extended release tablet, Take 1 tablet by mouth every 12 hours for 15 days, Disp: 30 tablet, Rfl: 0    topiramate (TOPAMAX) 25 MG tablet, Start 1 twice daily x 2 wks, then 1 in a.m. and 2 in p.m. x 2 wks, then 2 twice daily if needed, Disp: 124 tablet, Rfl: 5    eletriptan (RELPAX) 40 MG tablet, No more than one in 24 hrs., Disp: 6 tablet, Rfl: 3    Elagolix Sodium (ORILISSA) 200 MG TABS, Take 1 tablet by mouth 2 times daily, Disp: 60 tablet, Rfl: 3    vitamin D (ERGOCALCIFEROL) 1.25 MG (77728 UT) CAPS capsule, Take 1 capsule by mouth once a week, Disp: 12 capsule, Rfl: 0    Calcium Carbonate (CALCI-CHEW PO), Take 3 tablets by mouth daily, Disp: , Rfl:     vitamin D (ERGOCALCIFEROL) 1.25 MG (36526 UT) CAPS capsule, Take 1 capsule by mouth Twice a Week, Disp: 24 capsule, Rfl: 1    omeprazole (PRILOSEC) 20 MG delayed release capsule, Take 20 mg by mouth Daily Instructed to take morning of surgery with a sip of water, Disp: , Rfl:    Patient Active Problem List   Diagnosis    Recurrent acute suppurative otitis media of right ear without spontaneous rupture of tympanic membrane    Acute otitis externa of right ear    Migraine headache    PCOS (polycystic ovarian syndrome)    Vitamin D deficiency    Folic acid deficiency    Chronic tension-type headache, intractable    Diarrhea    Non-intractable vomiting    Dysmenorrhea    Right ovarian cyst    GERD with esophagitis    Morbid obesity due to excess calories (HCC)    Disruption or dehiscence of closure of muscle or muscle flap    Chronic mixed headache syndrome    Acute bacterial sinusitis     Past Medical History:   Diagnosis Date    Allergic rhinitis     history    Anesthesia complication     pulse ox dropped    Arthritis     Chronic mixed headache syndrome 4/23/2021    Chronic sinusitis     Chronic tension-type headache, intractable 1/24/2020    Constipation     Depression with anxiety     Folic acid deficiency 5/16/4884    Migraine headache 1/24/2020    Morbidly obese file     Gets together: Not on file     Attends Gnosticist service: Not on file     Active member of club or organization: Not on file     Attends meetings of clubs or organizations: Not on file     Relationship status: Not on file    Intimate partner violence     Fear of current or ex partner: Not on file     Emotionally abused: Not on file     Physically abused: Not on file     Forced sexual activity: Not on file   Other Topics Concern    Not on file   Social History Narrative    Not on file     Family History   Problem Relation Age of Onset    High Blood Pressure Mother     High Cholesterol Mother     Arthritis Father     No Known Problems Sister     Cancer Maternal Grandmother     COPD Maternal Grandmother     Diabetes Maternal Grandmother     Elevated Lipids Maternal Grandmother     Hypertension Maternal Grandmother     Heart Disease Maternal Grandfather     Cancer Maternal Grandfather     Diabetes Maternal Grandfather       There are no preventive care reminders to display for this patient. There are no preventive care reminders to display for this patient. There are no preventive care reminders to display for this patient. There are no preventive care reminders to display for this patient. Health Maintenance   Topic Date Due    Varicella vaccine (1 of 2 - 2-dose childhood series) Never done    HIV screen  Never done    COVID-19 Vaccine (2 - Moderna 2-dose series) 04/23/2021    Flu vaccine (Season Ended) 09/01/2021    Cervical cancer screen  02/21/2025    DTaP/Tdap/Td vaccine (2 - Td) 03/16/2031    Hepatitis C screen  Completed    Hepatitis A vaccine  Aged Out    Hepatitis B vaccine  Aged Out    Hib vaccine  Aged Out    Meningococcal (ACWY) vaccine  Aged Out    Pneumococcal 0-64 years Vaccine  Aged Out      There are no preventive care reminders to display for this patient. There are no preventive care reminders to display for this patient.          /62 (Site: Right Upper Arm)   Pulse 86   Temp 97.9 °F (36.6 °C)   Ht 5' 7\" (1.702 m)   Wt 232 lb (105.2 kg)   SpO2 98%   BMI 36.34 kg/m²       Physical Exam  Vitals signs reviewed. Constitutional:       Appearance: She is well-developed. She is not ill-appearing. HENT:      Head: Normocephalic and atraumatic. Right Ear: Hearing and tympanic membrane normal.      Left Ear: Hearing and tympanic membrane normal.      Nose: Nose normal.      Mouth/Throat:      Dentition: Normal dentition. Pharynx: No oropharyngeal exudate or posterior oropharyngeal erythema. Tonsils: No tonsillar exudate. Eyes:      Conjunctiva/sclera: Conjunctivae normal.      Pupils: Pupils are equal, round, and reactive to light. Neck:      Musculoskeletal: Normal range of motion and neck supple. Cardiovascular:      Rate and Rhythm: Normal rate and regular rhythm. Heart sounds: Normal heart sounds. No murmur. Pulmonary:      Effort: Pulmonary effort is normal. No respiratory distress. Breath sounds: Normal breath sounds. No wheezing. Abdominal:      General: Bowel sounds are normal. There is no distension. Palpations: Abdomen is soft. Lymphadenopathy:      Cervical: No cervical adenopathy. Skin:     General: Skin is warm and dry. Findings: No rash. Neurological:      Mental Status: She is alert. Psychiatric:         Behavior: Behavior normal.                 An electronic signature was used to authenticate this note.     --Neelam Acosta DO

## 2021-04-30 ASSESSMENT — ENCOUNTER SYMPTOMS
EYES NEGATIVE: 1
GASTROINTESTINAL NEGATIVE: 1
RESPIRATORY NEGATIVE: 1
TROUBLE SWALLOWING: 0
SORE THROAT: 1
SINUS PAIN: 1
RHINORRHEA: 1
SINUS PRESSURE: 1

## 2021-05-07 ENCOUNTER — IMMUNIZATION (OUTPATIENT)
Dept: PRIMARY CARE CLINIC | Age: 36
End: 2021-05-07
Payer: COMMERCIAL

## 2021-05-07 PROCEDURE — 0012A COVID-19, MODERNA VACCINE 100MCG/0.5ML DOSE: CPT | Performed by: PHYSICIAN ASSISTANT

## 2021-05-07 PROCEDURE — 91301 COVID-19, MODERNA VACCINE 100MCG/0.5ML DOSE: CPT | Performed by: PHYSICIAN ASSISTANT

## 2021-05-11 ENCOUNTER — OFFICE VISIT (OUTPATIENT)
Dept: PODIATRY | Age: 36
End: 2021-05-11
Payer: COMMERCIAL

## 2021-05-11 DIAGNOSIS — L60.0 INGROWN NAIL: ICD-10-CM

## 2021-05-11 DIAGNOSIS — S99.921A INJURY OF RIGHT FOOT, INITIAL ENCOUNTER: Primary | ICD-10-CM

## 2021-05-11 DIAGNOSIS — M79.674 PAIN IN RIGHT TOE(S): ICD-10-CM

## 2021-05-11 PROCEDURE — 99213 OFFICE O/P EST LOW 20 MIN: CPT | Performed by: PODIATRIST
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
## 2021-06-06 NOTE — DISCHARGE SUMMARY
Physician Discharge Summary     Patient ID:  Brenda Calvert  [id number]  15 y.o.  1985    Admit date: 6/30/2020    Discharge date and time: 7/1/2020    Admitting Physician: Taylor Dennis MD     Admission Diagnoses:   Patient Active Problem List   Diagnosis    Recurrent acute suppurative otitis media of right ear without spontaneous rupture of tympanic membrane    Acute otitis externa of right ear    Migraine headache    PCOS (polycystic ovarian syndrome)    Vitamin D deficiency    Folic acid deficiency    Chronic tension-type headache, intractable    Diarrhea    Non-intractable vomiting    Dysmenorrhea    Right ovarian cyst    GERD with esophagitis    Morbid obesity due to excess calories ([address]    Disruption or dehiscence of closure of muscle or muscle flap       Discharge Diagnoses:   Patient Active Problem List   Diagnosis    Recurrent acute suppurative otitis media of right ear without spontaneous rupture of tympanic membrane    Acute otitis externa of right ear    Migraine headache    PCOS (polycystic ovarian syndrome)    Vitamin D deficiency    Folic acid deficiency    Chronic tension-type headache, intractable    Diarrhea    Non-intractable vomiting    Dysmenorrhea    Right ovarian cyst    GERD with esophagitis    Morbid obesity due to excess calories ([address]    Disruption or dehiscence of closure of muscle or muscle flap       Admission Condition: good    Discharged Condition: good    Indication for Admission: Morbid obesity, HTN    Hospital Course: Brenda Calvert is a 28 y.o. female who was admitted with morbid obesity and hypertension, including multiple other comorbidities. Patient underwent laparoscopic Maty-en-Y gastric bypass and hiatla hernia repair without complication. They did well postoperatively, diet was advanced, they were ambulating independently and pain was controlled.  They were discharged with appropriate medication, instructions and follow · Mild likely due to underlying malignancy  · Monitor while inpatient up.     Consults: Hospitalist    Significant Diagnostic Studies: labs    Treatments: IV hydration, antibiotics: Ancef, analgesia: IV narcotic, tylenol, toradol and oral narcotics and surgery: Laparoscopic Maty-en-Y GB and hiatal hernia repair    In process/preliminary results:  Outstanding Order Results     Date and Time Order Name Status Description    6/30/2020 0000 Surgical Pathology In process           Patient Instructions:   Current Discharge Medication List      START taking these medications    Details   oxyCODONE-acetaminophen (ROXICET) 5-325 MG/5ML solution Take 5 mLs by mouth every 4 hours as needed for Pain for up to 10 doses. Qty: 50 mL, Refills: 0    Comments: Reduce doses taken as pain becomes manageable  Associated Diagnoses: Postoperative pain      docusate sodium (COLACE) 100 MG capsule Take 1 capsule by mouth daily as needed for Constipation  Qty: 30 capsule, Refills: 0         CONTINUE these medications which have NOT CHANGED    Details   propranolol (INDERAL) 10 MG tablet Take one 3 times daily, may increase to 2 twice daily in 2 wks. , migraine  Qty: 186 tablet, Refills: 5    Associated Diagnoses: Intractable migraine without aura and without status migrainosus      !! omeprazole (PRILOSEC) 20 MG delayed release capsule Take 20 mg by mouth 2 times daily       !! Elagolix Sodium (ORILISSA) 200 MG TABS Take 1 tablet by mouth 2 times daily  Qty: 60 tablet, Refills: 3    Associated Diagnoses: Right ovarian cyst; Dysmenorrhea; Endometriosis      ondansetron (ZOFRAN-ODT) 4 MG disintegrating tablet Take 1 tablet by mouth every 8 hours as needed for Nausea or Vomiting  Qty: 10 tablet, Refills: 0    Associated Diagnoses: Post-operative nausea and vomiting      !! omeprazole (PRILOSEC) 20 MG delayed release capsule Take 1 capsule by mouth daily  Qty: 30 capsule, Refills: 12    Associated Diagnoses: Gastroesophageal reflux disease without esophagitis      gabapentin (NEURONTIN) 100 MG capsule Take 1

## 2021-06-10 NOTE — TELEPHONE ENCOUNTER
Evangelina Parham is inviting you to a scheduled Zoom meeting. Topic: [facility] Zoom RYGB Meeting  Time: Emmett 10, 2020 09:30 AM [place] Time ( and [place] ([facility]    SUSANNAH  [email or web address]    Meeting ID: [phone]  [facility]: [id number]  One tap mobile  [phone],,[phone]# [place] ([facility])  [phone],,[phone]# [address] ([address])    Dial by your location          +3  [place] ([place])          +1 [address])          [phone] [facility])          [phone] [facility])          [address] [place] [address])          +1 [address]  [address] [place] [address] ID: [phone]: [id number]  Find your local number: [email or web address]      Pt agreed on 5/21/20 due to Covid-19 to complete her 2 hour and 30 minute education on RYGB / LSG surgery on zoom. Pt is aware no personnel information will be shared and no protected health information will be discussed. Pt was made aware there can be slim possibility of a breech in content information. Pt agreed upon accepting the invitation for the Zoom Class and attending the meeting. Pt also had the option of other arrangements to be made to complete this class if she did not want to attend Zoom. Problem: Falls - Risk of:  Goal: Will remain free from falls  Description: Will remain free from falls  Outcome: Ongoing  Goal: Absence of physical injury  Description: Absence of physical injury  Outcome: Ongoing     Problem: Pain:  Goal: Pain level will decrease  Description: Pain level will decrease  Outcome: Ongoing  Goal: Control of acute pain  Description: Control of acute pain  Outcome: Ongoing  Goal: Control of chronic pain  Description: Control of chronic pain  Outcome: Ongoing     Problem: Nutrition  Goal: Optimal nutrition therapy  Outcome: Ongoing     Problem: Skin Integrity:  Goal: Will show no infection signs and symptoms  Description: Will show no infection signs and symptoms  Outcome: Ongoing  Goal: Absence of new skin breakdown  Description: Absence of new skin breakdown  Outcome: Ongoing

## 2021-06-22 ENCOUNTER — TELEPHONE (OUTPATIENT)
Dept: BARIATRICS/WEIGHT MGMT | Age: 36
End: 2021-06-22

## 2021-06-24 ENCOUNTER — TELEPHONE (OUTPATIENT)
Dept: BARIATRICS/WEIGHT MGMT | Age: 36
End: 2021-06-24

## 2021-06-24 LAB
ALBUMIN SERPL-MCNC: 3.5 G/DL (ref 3.5–5.2)
ALP BLD-CCNC: 91 U/L (ref 35–104)
ALT SERPL-CCNC: 26 U/L (ref 0–32)
ANION GAP SERPL CALCULATED.3IONS-SCNC: 18 MMOL/L (ref 7–16)
AST SERPL-CCNC: 26 U/L (ref 0–31)
BILIRUB SERPL-MCNC: 0.3 MG/DL (ref 0–1.2)
BUN BLDV-MCNC: 18 MG/DL (ref 6–20)
CALCIUM SERPL-MCNC: 9.3 MG/DL (ref 8.6–10.2)
CHLORIDE BLD-SCNC: 102 MMOL/L (ref 98–107)
CHOLESTEROL, TOTAL: 165 MG/DL (ref 0–199)
CO2: 20 MMOL/L (ref 22–29)
CREAT SERPL-MCNC: 0.9 MG/DL (ref 0.5–1)
FERRITIN: 20 NG/ML
FOLATE: 5.1 NG/ML (ref 4.8–24.2)
GFR AFRICAN AMERICAN: >60
GFR NON-AFRICAN AMERICAN: >60 ML/MIN/1.73
GLUCOSE BLD-MCNC: 65 MG/DL (ref 74–99)
HCT VFR BLD CALC: 42.2 % (ref 34–48)
HEMOGLOBIN: 13.3 G/DL (ref 11.5–15.5)
MCH RBC QN AUTO: 28.4 PG (ref 26–35)
MCHC RBC AUTO-ENTMCNC: 31.5 % (ref 32–34.5)
MCV RBC AUTO: 90.2 FL (ref 80–99.9)
PDW BLD-RTO: 13.9 FL (ref 11.5–15)
PLATELET # BLD: 272 E9/L (ref 130–450)
PMV BLD AUTO: 10.7 FL (ref 7–12)
POTASSIUM SERPL-SCNC: 4.2 MMOL/L (ref 3.5–5)
PREALBUMIN: 21 MG/DL (ref 20–40)
RBC # BLD: 4.68 E12/L (ref 3.5–5.5)
SODIUM BLD-SCNC: 140 MMOL/L (ref 132–146)
TOTAL PROTEIN: 6.8 G/DL (ref 6.4–8.3)
TRIGL SERPL-MCNC: 48 MG/DL (ref 0–149)
VITAMIN B-12: 1429 PG/ML (ref 211–946)
VITAMIN D 25-HYDROXY: 24 NG/ML (ref 30–100)
WBC # BLD: 6.5 E9/L (ref 4.5–11.5)

## 2021-06-24 NOTE — TELEPHONE ENCOUNTER
Patient called in and is having some burning in pouch area and lower abdominal cramping. She went back on Omeprazole yesterday. I discussed she can stay on omeprazole and increase it to 2 daily. She will avoid any spicy food and she will increase protein intake. If pain persists she will go to [facility]. She is in agreement with this plan.

## 2021-06-25 ENCOUNTER — HOSPITAL ENCOUNTER (EMERGENCY)
Age: 36
Discharge: HOME | End: 2021-06-25
Attending: EMERGENCY MEDICINE
Payer: COMMERCIAL

## 2021-06-25 ENCOUNTER — APPOINTMENT (OUTPATIENT)
Dept: CT IMAGING | Age: 36
End: 2021-06-25
Payer: COMMERCIAL

## 2021-06-25 VITALS
RESPIRATION RATE: 18 BRPM | OXYGEN SATURATION: 98 % | HEART RATE: 68 BPM | SYSTOLIC BLOOD PRESSURE: 92 MMHG | TEMPERATURE: 97.1 F | DIASTOLIC BLOOD PRESSURE: 50 MMHG

## 2021-06-25 DIAGNOSIS — R10.13 ABDOMINAL PAIN, EPIGASTRIC: Primary | ICD-10-CM

## 2021-06-25 LAB
ALBUMIN SERPL-MCNC: 4.1 G/DL (ref 3.5–5.2)
ALP BLD-CCNC: 116 U/L (ref 35–104)
ALT SERPL-CCNC: 28 U/L (ref 0–32)
ANION GAP SERPL CALCULATED.3IONS-SCNC: 8 MMOL/L (ref 7–16)
AST SERPL-CCNC: 27 U/L (ref 0–31)
BACTERIA: ABNORMAL /HPF
BASOPHILS ABSOLUTE: 0.02 E9/L (ref 0–0.2)
BASOPHILS RELATIVE PERCENT: 0.3 % (ref 0–2)
BILIRUB SERPL-MCNC: 0.4 MG/DL (ref 0–1.2)
BILIRUBIN URINE: NEGATIVE
BLOOD, URINE: NEGATIVE
BUN BLDV-MCNC: 22 MG/DL (ref 6–20)
CALCIUM SERPL-MCNC: 8.9 MG/DL (ref 8.6–10.2)
CHLORIDE BLD-SCNC: 100 MMOL/L (ref 98–107)
CLARITY: CLEAR
CO2: 29 MMOL/L (ref 22–29)
COLOR: YELLOW
CREAT SERPL-MCNC: 0.9 MG/DL (ref 0.5–1)
EKG ATRIAL RATE: 55 BPM
EKG P AXIS: 48 DEGREES
EKG P-R INTERVAL: 172 MS
EKG Q-T INTERVAL: 432 MS
EKG QRS DURATION: 106 MS
EKG QTC CALCULATION (BAZETT): 413 MS
EKG R AXIS: 24 DEGREES
EKG T AXIS: 21 DEGREES
EKG VENTRICULAR RATE: 55 BPM
EOSINOPHILS ABSOLUTE: 0.12 E9/L (ref 0.05–0.5)
EOSINOPHILS RELATIVE PERCENT: 1.6 % (ref 0–6)
EPITHELIAL CELLS, UA: ABNORMAL /HPF
GFR AFRICAN AMERICAN: >60
GFR NON-AFRICAN AMERICAN: >60 ML/MIN/1.73
GLUCOSE BLD-MCNC: 60 MG/DL (ref 74–99)
GLUCOSE URINE: NEGATIVE MG/DL
HCG, URINE, POC: NEGATIVE
HCT VFR BLD CALC: 43 % (ref 34–48)
HEMOGLOBIN: 13.7 G/DL (ref 11.5–15.5)
IMMATURE GRANULOCYTES #: 0.02 E9/L
IMMATURE GRANULOCYTES %: 0.3 % (ref 0–5)
KETONES, URINE: NEGATIVE MG/DL
LACTIC ACID: 0.8 MMOL/L (ref 0.5–2.2)
LEUKOCYTE ESTERASE, URINE: NEGATIVE
LIPASE: 42 U/L (ref 13–60)
LYMPHOCYTES ABSOLUTE: 1.84 E9/L (ref 1.5–4)
LYMPHOCYTES RELATIVE PERCENT: 25 % (ref 20–42)
Lab: NORMAL
MCH RBC QN AUTO: 28.2 PG (ref 26–35)
MCHC RBC AUTO-ENTMCNC: 31.9 % (ref 32–34.5)
MCV RBC AUTO: 88.7 FL (ref 80–99.9)
MONOCYTES ABSOLUTE: 0.49 E9/L (ref 0.1–0.95)
MONOCYTES RELATIVE PERCENT: 6.6 % (ref 2–12)
NEGATIVE QC PASS/FAIL: NORMAL
NEUTROPHILS ABSOLUTE: 4.88 E9/L (ref 1.8–7.3)
NEUTROPHILS RELATIVE PERCENT: 66.2 % (ref 43–80)
NITRITE, URINE: NEGATIVE
PDW BLD-RTO: 13.5 FL (ref 11.5–15)
PH UA: 6 (ref 5–9)
PLATELET # BLD: 265 E9/L (ref 130–450)
PMV BLD AUTO: 10.2 FL (ref 7–12)
POSITIVE QC PASS/FAIL: NORMAL
POTASSIUM REFLEX MAGNESIUM: 3.6 MMOL/L (ref 3.5–5)
PROTEIN UA: NEGATIVE MG/DL
RBC # BLD: 4.85 E12/L (ref 3.5–5.5)
RBC UA: ABNORMAL /HPF (ref 0–2)
SODIUM BLD-SCNC: 137 MMOL/L (ref 132–146)
SPECIFIC GRAVITY UA: 1.02 (ref 1–1.03)
TOTAL PROTEIN: 7.1 G/DL (ref 6.4–8.3)
TROPONIN, HIGH SENSITIVITY: <6 NG/L (ref 0–9)
UROBILINOGEN, URINE: 0.2 E.U./DL
WBC # BLD: 7.4 E9/L (ref 4.5–11.5)
WBC UA: ABNORMAL /HPF (ref 0–5)

## 2021-06-25 PROCEDURE — 2500000003 HC RX 250 WO HCPCS: Performed by: STUDENT IN AN ORGANIZED HEALTH CARE EDUCATION/TRAINING PROGRAM

## 2021-06-25 PROCEDURE — 96374 THER/PROPH/DIAG INJ IV PUSH: CPT

## 2021-06-25 PROCEDURE — 83605 ASSAY OF LACTIC ACID: CPT

## 2021-06-25 PROCEDURE — 84484 ASSAY OF TROPONIN QUANT: CPT

## 2021-06-25 PROCEDURE — 6360000002 HC RX W HCPCS: Performed by: STUDENT IN AN ORGANIZED HEALTH CARE EDUCATION/TRAINING PROGRAM

## 2021-06-25 PROCEDURE — 99284 EMERGENCY DEPT VISIT MOD MDM: CPT

## 2021-06-25 PROCEDURE — 83690 ASSAY OF LIPASE: CPT

## 2021-06-25 PROCEDURE — 74177 CT ABD & PELVIS W/CONTRAST: CPT

## 2021-06-25 PROCEDURE — 2580000003 HC RX 258: Performed by: STUDENT IN AN ORGANIZED HEALTH CARE EDUCATION/TRAINING PROGRAM

## 2021-06-25 PROCEDURE — 85025 COMPLETE CBC W/AUTO DIFF WBC: CPT

## 2021-06-25 PROCEDURE — 36415 COLL VENOUS BLD VENIPUNCTURE: CPT

## 2021-06-25 PROCEDURE — 96375 TX/PRO/DX INJ NEW DRUG ADDON: CPT

## 2021-06-25 PROCEDURE — 6360000004 HC RX CONTRAST MEDICATION: Performed by: RADIOLOGY

## 2021-06-25 PROCEDURE — 93010 ELECTROCARDIOGRAM REPORT: CPT | Performed by: INTERNAL MEDICINE

## 2021-06-25 PROCEDURE — 93005 ELECTROCARDIOGRAM TRACING: CPT | Performed by: STUDENT IN AN ORGANIZED HEALTH CARE EDUCATION/TRAINING PROGRAM

## 2021-06-25 PROCEDURE — 96372 THER/PROPH/DIAG INJ SC/IM: CPT

## 2021-06-25 PROCEDURE — 81001 URINALYSIS AUTO W/SCOPE: CPT

## 2021-06-25 PROCEDURE — 80053 COMPREHEN METABOLIC PANEL: CPT

## 2021-06-25 RX ORDER — 0.9 % SODIUM CHLORIDE 0.9 %
1000 INTRAVENOUS SOLUTION INTRAVENOUS ONCE
Status: COMPLETED | OUTPATIENT
Start: 2021-06-25 | End: 2021-06-25

## 2021-06-25 RX ORDER — DEXTROSE MONOHYDRATE 25 G/50ML
25 INJECTION, SOLUTION INTRAVENOUS ONCE
Status: COMPLETED | OUTPATIENT
Start: 2021-06-25 | End: 2021-06-25

## 2021-06-25 RX ORDER — FENTANYL CITRATE 50 UG/ML
50 INJECTION, SOLUTION INTRAMUSCULAR; INTRAVENOUS ONCE
Status: COMPLETED | OUTPATIENT
Start: 2021-06-25 | End: 2021-06-25

## 2021-06-25 RX ORDER — ONDANSETRON 2 MG/ML
4 INJECTION INTRAMUSCULAR; INTRAVENOUS ONCE
Status: COMPLETED | OUTPATIENT
Start: 2021-06-25 | End: 2021-06-25

## 2021-06-25 RX ORDER — ONDANSETRON 4 MG/1
4 TABLET, ORALLY DISINTEGRATING ORAL EVERY 8 HOURS PRN
Qty: 24 TABLET | Refills: 0 | Status: SHIPPED | OUTPATIENT
Start: 2021-06-25 | End: 2022-02-23

## 2021-06-25 RX ORDER — DICYCLOMINE HYDROCHLORIDE 10 MG/ML
20 INJECTION INTRAMUSCULAR ONCE
Status: COMPLETED | OUTPATIENT
Start: 2021-06-25 | End: 2021-06-25

## 2021-06-25 RX ORDER — SUCRALFATE 1 G/1
1 TABLET ORAL 4 TIMES DAILY
Qty: 28 TABLET | Refills: 0 | Status: SHIPPED | OUTPATIENT
Start: 2021-06-25 | End: 2021-07-09 | Stop reason: SDUPTHER

## 2021-06-25 RX ADMIN — IOHEXOL 50 ML: 240 INJECTION, SOLUTION INTRATHECAL; INTRAVASCULAR; INTRAVENOUS; ORAL at 12:24

## 2021-06-25 RX ADMIN — DEXTROSE MONOHYDRATE 25 ML: 25 INJECTION, SOLUTION INTRAVENOUS at 12:41

## 2021-06-25 RX ADMIN — FENTANYL CITRATE 50 MCG: 50 INJECTION, SOLUTION INTRAMUSCULAR; INTRAVENOUS at 10:48

## 2021-06-25 RX ADMIN — SODIUM CHLORIDE 1000 ML: 9 INJECTION, SOLUTION INTRAVENOUS at 10:47

## 2021-06-25 RX ADMIN — DICYCLOMINE HYDROCHLORIDE 20 MG: 10 INJECTION, SOLUTION INTRAMUSCULAR at 10:48

## 2021-06-25 RX ADMIN — IOPAMIDOL 75 ML: 755 INJECTION, SOLUTION INTRAVENOUS at 12:25

## 2021-06-25 RX ADMIN — ONDANSETRON 4 MG: 2 INJECTION INTRAMUSCULAR; INTRAVENOUS at 10:48

## 2021-06-25 ASSESSMENT — PAIN DESCRIPTION - FREQUENCY: FREQUENCY: CONTINUOUS

## 2021-06-25 ASSESSMENT — PAIN DESCRIPTION - LOCATION: LOCATION: ABDOMEN

## 2021-06-25 ASSESSMENT — PAIN SCALES - GENERAL
PAINLEVEL_OUTOF10: 6
PAINLEVEL_OUTOF10: 6

## 2021-06-25 ASSESSMENT — PAIN DESCRIPTION - DESCRIPTORS: DESCRIPTORS: STABBING;CRAMPING

## 2021-06-25 NOTE — ED PROVIDER NOTES
---------------------------------------------  Past Medical History:  has a past medical history of Allergic rhinitis, Anesthesia complication, Arthritis, Chronic mixed headache syndrome, Chronic sinusitis, Chronic tension-type headache, intractable, Constipation, Depression with anxiety, Folic acid deficiency, Migraine headache, Morbidly obese ([address] PCOS (polycystic ovarian syndrome), Prolonged emergence from general anesthesia, RLS (restless legs syndrome), and Vitamin D deficiency. Past Surgical History:  has a past surgical history that includes Cholecystectomy (); hysteroscopy (2015); Sleeve Gastrectomy (); Appendectomy ();  section (); Dilation and curettage of uterus (); Tubal ligation (); Upper gastrointestinal endoscopy (N/A, 2020); Colonoscopy; Maty-en-Y Gastric Bypass (N/A, 2020); and Achilles tendon surgery (Right, 2020). Social History:  reports that she quit smoking about 6 years ago. She started smoking about 28 years ago. She has a 11.00 pack-year smoking history. She has never used smokeless tobacco. She reports previous alcohol use. She reports that she does not use drugs. Family History: family history includes Arthritis in her father; COPD in her maternal grandmother; Cancer in her maternal grandfather and maternal grandmother; Diabetes in her maternal grandfather and maternal grandmother; Elevated Lipids in her maternal grandmother; Heart Disease in her maternal grandfather; High Blood Pressure in her mother; High Cholesterol in her mother; Hypertension in her maternal grandmother; No Known Problems in her sister. . Unless otherwise noted, family history is non contributory    The patients home medications have been reviewed.     Allergies: Ciprofloxacin    I have reviewed the past medical history, past surgical history, social history, and family history    ---------------------------------------------------PHYSICAL EXAM--------------------------------------    Constitutional/General: Alert and oriented x3, obese, chronically ill-appearing, no acute distress  Head: Normocephalic and atraumatic  Eyes:  EOMI, sclera non icteric  Mouth: Oropharynx clear, handling secretions, no trismus, no asymmetry of the posterior oropharynx or uvular edema  Neck: Supple, full ROM, no stridor, no meningeal signs  Respiratory: Lungs clear to auscultation bilaterally, no wheezes, rales, or rhonchi. Not in respiratory distress  Cardiovascular:  Regular rate. Regular rhythm. No murmurs, no aortic murmurs, no gallops, or rubs. Chest: No chest wall tenderness  Gastrointestinal: Abdomen is soft with mild tenderness palpation over the epigastric region. There is no rigidity or rebound tenderness or guarding present. No peritoneal signs. Musculoskeletal: Moves all extremities x 4. Warm and well perfused, no clubbing, no cyanosis, no edema. Capillary refill <3 seconds  Skin: skin warm and dry. No rashes. Neurologic: GCS 15, no focal deficits, symmetric strength 5/5 in the upper and lower extremities bilaterally  Psychiatric: Normal Affect          -------------------------------------------------- RESULTS -------------------------------------------------  I have personally reviewed all laboratory and imaging results for this patient. Results are listed below.      LABS: (Lab results interpreted by me)  Results for orders placed or performed during the hospital encounter of 06/25/21   CBC auto differential   Result Value Ref Range    WBC 7.4 4.5 - 11.5 E9/L    RBC 4.85 3.50 - 5.50 E12/L    Hemoglobin 13.7 11.5 - 15.5 g/dL    Hematocrit 43.0 34.0 - 48.0 %    MCV 88.7 80.0 - 99.9 fL    MCH 28.2 26.0 - 35.0 pg    MCHC 31.9 (L) 32.0 - 34.5 %    RDW 13.5 11.5 - 15.0 fL    Platelets 084 982 - 398 E9/L    MPV 10.2 7.0 - 12.0 fL    Neutrophils % 66.2 43.0 - 80.0 %    Immature Granulocytes % 0.3 0.0 - 5.0 %    Lymphocytes % 25.0 20.0 - 42.0 %    Monocytes % 6.6 2.0 - 12.0 %    Eosinophils % 1.6 0.0 - 6.0 %    Basophils % 0.3 0.0 - 2.0 %    Neutrophils Absolute 4.88 1.80 - 7.30 E9/L    Immature Granulocytes # 0.02 E9/L    Lymphocytes Absolute 1.84 1.50 - 4.00 E9/L    Monocytes Absolute 0.49 0.10 - 0.95 E9/L    Eosinophils Absolute 0.12 0.05 - 0.50 E9/L    Basophils Absolute 0.02 0.00 - 0.20 E9/L   Comprehensive Metabolic Panel w/ Reflex to MG   Result Value Ref Range    Sodium 137 132 - 146 mmol/L    Potassium reflex Magnesium 3.6 3.5 - 5.0 mmol/L    Chloride 100 98 - 107 mmol/L    CO2 29 22 - 29 mmol/L    Anion Gap 8 7 - 16 mmol/L    Glucose 60 (L) 74 - 99 mg/dL    BUN 22 (H) 6 - 20 mg/dL    CREATININE 0.9 0.5 - 1.0 mg/dL    GFR Non-African American >60 >=60 mL/min/1.73    GFR African American >60     Calcium 8.9 8.6 - 10.2 mg/dL    Total Protein 7.1 6.4 - 8.3 g/dL    Albumin 4.1 3.5 - 5.2 g/dL    Total Bilirubin 0.4 0.0 - 1.2 mg/dL    Alkaline Phosphatase 116 (H) 35 - 104 U/L    ALT 28 0 - 32 U/L    AST 27 0 - 31 U/L   Troponin   Result Value Ref Range    Troponin, High Sensitivity <6 0 - 9 ng/L   Lipase   Result Value Ref Range    Lipase 42 13 - 60 U/L   Lactic Acid, Plasma   Result Value Ref Range    Lactic Acid 0.8 0.5 - 2.2 mmol/L   Urinalysis with Microscopic   Result Value Ref Range    Color, UA Yellow Straw/Yellow    Clarity, UA Clear Clear    Glucose, Ur Negative Negative mg/dL    Bilirubin Urine Negative Negative    Ketones, Urine Negative Negative mg/dL    Specific Gravity, UA 1.025 1.005 - 1.030    Blood, Urine Negative Negative    pH, UA 6.0 5.0 - 9.0    Protein, UA Negative Negative mg/dL    Urobilinogen, Urine 0.2 <2.0 E.U./dL    Nitrite, Urine Negative Negative    Leukocyte Esterase, Urine Negative Negative    WBC, UA 0-1 0 - 5 /HPF    RBC, UA NONE 0 - 2 /HPF    Epithelial Cells, UA RARE /HPF    Bacteria, UA FEW (A) None Seen /HPF   POC Pregnancy Urine Qual   Result Value Ref Range    HCG, Urine, POC Negative Negative    Lot Number [id number]     Positive QC Pass/Fail Pass     Negative QC Pass/Fail Pass    EKG 12 Lead   Result Value Ref Range    Ventricular Rate 55 BPM    Atrial Rate 55 BPM    P-R Interval 172 ms    QRS Duration 106 ms    Q-T Interval 432 ms    QTc Calculation (Bazett) 413 ms    P Axis 48 degrees    R Axis 24 degrees    T Axis 21 degrees   ,       RADIOLOGY:  Interpreted by Radiologist unless otherwise specified  CT ABDOMEN PELVIS W IV CONTRAST Additional Contrast? Oral   Final Result   1. There is no acute intra-abdominal or intrapelvic pathology. 2. Previous gastric bypass surgery. There are no findings of bowel   obstruction. There are no  inflammatory changes seen within the epigastric   region and there is no left upper quadrant fluid collection. EKG Interpretation  Interpreted by Radha Stern DO    EKG: This EKG is signed and interpreted by me. Rate: 55  Rhythm: Sinus  Interpretation: Sinus bradycardia with sinus arrhythmia, normal axis, no acute ST elevation or depression, intervals within normal limits, QTC is 413  Comparison: stable as compared to patient's most recent EKG       ------------------------- NURSING NOTES AND VITALS REVIEWED ---------------------------   The nursing notes within the ED encounter and vital signs as below have been reviewed by myself  BP (!) 92/50   Pulse 68   Temp 97.1 °F (36.2 °C) (Temporal)   Resp 18   SpO2 98%     Oxygen Saturation Interpretation: 98 % on room air. Normal    The patients available past medical records and past encounters were reviewed.         ------------------------------ ED COURSE/MEDICAL DECISION MAKING----------------------  Medications   0.9 % sodium chloride bolus (0 mLs Intravenous Stopped 6/25/21 1241)   ondansetron (ZOFRAN) injection 4 mg (4 mg Intravenous Given 6/25/21 1048)   fentaNYL (SUBLIMAZE) injection 50 mcg (50 mcg Intravenous Given 6/25/21 1048)   dicyclomine (BENTYL) injection 20 mg (20 mg Intramuscular Given 6/25/21 1048)   iopamidol (ISOVUE-370) 76 % injection 75 mL (75 mLs Intravenous Given 6/25/21 1225)   iohexol (OMNIPAQUE 240) injection 50 mL (50 mLs Oral Given 6/25/21 1224)   dextrose 50 % IV solution (25 mLs Intravenous Given 6/25/21 1241)           The cardiac monitor revealed NSR with a heart rate in the 60s as interpreted by me. The cardiac monitor was ordered secondary to the patient's abdominal pain and to monitor the patient for dysrhythmia. CPT 03002           Medical Decision Making:     The patient was seen and evaluated by the Attending Emergency Medicine Physician Dr. Brianna Duval.      The patient is a 43-year-old female presents the emergency department complaining of abdominal pain and hypoglycemia. She is hemodynamically stable, nontoxic in appearance, and in no acute distress. Labs are reassuring and within normal limits. High-sensitivity troponin and EKG were negative. Did obtain CT scan with IV and oral contrast which did not show any acute abnormalities. Consulted with surgery, Dr. Kezia Matta, who states that the patient is stable to follow-up in the office as an outpatient. Patient is to return here for any significant worsening symptoms or other acute symptoms or concerns. She verbalized understanding agreement to treatment plan. Recommend her to continue taking the PPI twice daily and also provided prescription for Carafate. Re-Evaluations:  ED Course as of Jun 25 1453   Fri Jun 25, 2021   1015 ATTENDING PROVIDER ATTESTATION:     I have personally performed and/or participated in the history, exam, medical decision making, and procedures and agree with all pertinent clinical information unless otherwise noted. I have also reviewed and agree with the past medical, family and social history unless otherwise noted.     I have discussed this patient in detail with the resident, and provided the instruction and education regarding patient here complaining of recurrent epigastric abdominal pain and cramping with some nausea. Has had multiple abdominal surgeries, gallbladder and appendix out, had gastric sleeve and as well as a revision of the gastric bypass. No chest pain, palpitations or shortness of breath. Symptoms been ongoing for weeks but worsening today so she is here. .  My findings/plan: Patient is sitting the bed in no acute distress. Abdomen soft with mild epigastric discomfort, causes more nausea than actual pain or discomfort per her report. Heart rate regular, lungs are clear and equal.  No CVA tenderness. No jaundice or icterus. Abdomen no guarding, rebound tenderness or rigidity. [NC]   9190 Reevaluated the patient. She is sleeping and resting comfortably and in no acute distress. [KG]      ED Course User Index  [KG] Rachana Del Rio DO  [NC] Steve Bryan DO           This patient's ED course included: a personal history and physicial examination, re-evaluation prior to disposition, multiple bedside re-evaluations, IV medications, cardiac monitoring, continuous pulse oximetry and complex medical decision making and emergency management    This patient has remained hemodynamically stable during their ED course. Counseling: The emergency provider has spoken with the patient and discussed todays results, in addition to providing specific details for the plan of care and counseling regarding the diagnosis and prognosis. Questions are answered at this time and they are agreeable with the plan.       --------------------------------- IMPRESSION AND DISPOSITION ---------------------------------    IMPRESSION  1. Abdominal pain, epigastric        DISPOSITION  Disposition: Discharge to home  Patient condition is stable        NOTE: This report was transcribed using voice recognition software.  Every effort was made to ensure accuracy; however, inadvertent computerized transcription errors may be present       Rachana Del Rio DO  Resident  06/28/21 4548

## 2021-06-25 NOTE — ED NOTES
Results of testing explained to patient. Patient verbalizes understanding of instructions regarding testing and need to follow up with PCP and/or specialist provider.         Kellie Mars RN  06/25/21 9803

## 2021-06-28 LAB
COPPER: 110.4 UG/DL (ref 80–155)
SELENIUM: 130.2 UG/L (ref 23–190)
ZINC: 63.8 UG/DL (ref 60–120)

## 2021-06-30 LAB — VITAMIN B1 WHOLE BLOOD: 122 NMOL/L (ref 70–180)

## 2021-07-09 ENCOUNTER — OFFICE VISIT (OUTPATIENT)
Dept: BARIATRICS/WEIGHT MGMT | Age: 36
End: 2021-07-09
Payer: COMMERCIAL

## 2021-07-09 VITALS
HEIGHT: 67 IN | DIASTOLIC BLOOD PRESSURE: 63 MMHG | TEMPERATURE: 97.3 F | BODY MASS INDEX: 35.79 KG/M2 | SYSTOLIC BLOOD PRESSURE: 121 MMHG | RESPIRATION RATE: 20 BRPM | HEART RATE: 70 BPM | WEIGHT: 228 LBS

## 2021-07-09 DIAGNOSIS — K28.9 MARGINAL ULCER: ICD-10-CM

## 2021-07-09 DIAGNOSIS — K91.2 MALNUTRITION FOLLOWING GASTROINTESTINAL SURGERY: Primary | ICD-10-CM

## 2021-07-09 PROCEDURE — 99213 OFFICE O/P EST LOW 20 MIN: CPT | Performed by: SURGERY

## 2021-07-09 PROCEDURE — 99211 OFF/OP EST MAY X REQ PHY/QHP: CPT

## 2021-07-09 RX ORDER — SUCRALFATE 1 G/1
1 TABLET ORAL 4 TIMES DAILY
Qty: 120 TABLET | Refills: 2 | Status: SHIPPED
Start: 2021-07-09 | End: 2022-02-23

## 2021-07-09 NOTE — PROGRESS NOTES
1 yr LRYGB f/u, labs are in [address] Water intake is 128 oz daily, having bowel movements daily, vitamin intake is good, and getting protein through shakes and foods. Pt c/o her blood sugar dropping and stomach cramping.

## 2021-07-09 NOTE — PATIENT INSTRUCTIONS
Please continue to take your vitamin and mineral supplements as instructed. If you received a blood work prescription today for laboratory monitoring due prior to your next routine follow-up visit, please have this blood work obtained 10 to 14 days prior to your next visit. It is important to fast for 12 hours prior to routine weight loss surgery blood work, EXCEPT for drinking water, to ensure accuracy of results. Please report nausea, vomiting, abdominal pain, or any other problems you experience to your surgeon. For problems related to weight loss surgery, it is best to go to [address] [place] [address] Emergency Department and have your surgeon paged.

## 2021-07-09 NOTE — PROGRESS NOTES
Jennifer Benito Randall Covert  7/9/2021  [address]     Maty-en- Y Gastric Bypass  1 Year Post-Operative Follow-up     Oc Jaquez Covert is a 39 y.o. female who is 1 years post Laparoscopic Maty-en-Y Gastric Bypass surgery. Reports hypoglycemia episodes. She is not having swallowing difficulty, is compliant most of the time with the multivitamins and calcium + Vit D. She is meeting fluid recommendations of at least 64 ounces per day and is meeting protein recommendations. She  is exercising: walking 30 minutes/day- 2 days/week and cardiovascular equipment 20 minutes/day- 2 days/week. Weight=228 lb (103.4 kg)  Today's weight represents a total weight loss of 58 pounds since surgery with 0 pounds regained since the lowest weight. Prior to Admission medications    Medication Sig Start Date End Date Taking?  Authorizing Provider   ondansetron (ZOFRAN ODT) 4 MG disintegrating tablet Take 1 tablet by mouth every 8 hours as needed for Nausea or Vomiting 6/25/21  Yes Mariela Or, DO   sucralfate (CARAFATE) 1 GM tablet Take 1 tablet by mouth 4 times daily for 7 days 6/25/21 7/9/21 Yes Karlie Bruce,    topiramate (TOPAMAX) 25 MG tablet Start 1 twice daily x 2 wks, then 1 in a.m. and 2 in p.m. x 2 wks, then 2 twice daily if needed 4/23/21  Yes Isael Hilario MD   eletriptan (RELPAX) 40 MG tablet No more than one in 24 hrs. 4/23/21  Yes Isael Hilario MD   Elagolix Sodium (ORILISSA) 200 MG TABS Take 1 tablet by mouth 2 times daily 3/19/21  Yes JOANN Vallecillo   vitamin D (ERGOCALCIFEROL) 1.25 MG (20701 UT) CAPS capsule Take 1 capsule by mouth once a week 10/2/20  Yes Yuliana Frias MD   vitamin D (ERGOCALCIFEROL) 1.25 MG (30642 UT) CAPS capsule Take 1 capsule by mouth Twice a Week 4/13/20  Yes Yuliana Frias MD        Allergies   Allergen Reactions    Ciprofloxacin Anaphylaxis           Past Medical History:   Diagnosis Date    Allergic rhinitis capsule by mouth once a week 12 capsule 0    vitamin D (ERGOCALCIFEROL) 1.25 MG (63106 UT) CAPS capsule Take 1 capsule by mouth Twice a Week 24 capsule 1     No current facility-administered medications for this visit. Allergies   Allergen Reactions    Ciprofloxacin Anaphylaxis       Family History   Problem Relation Age of Onset    High Blood Pressure Mother     High Cholesterol Mother     Arthritis Father     No Known Problems Sister     Cancer Maternal Grandmother     COPD Maternal Grandmother     Diabetes Maternal Grandmother     Elevated Lipids Maternal Grandmother     Hypertension Maternal Grandmother     Heart Disease Maternal Grandfather     Cancer Maternal Grandfather     Diabetes Maternal Grandfather        Social History     Socioeconomic History    Marital status:      Spouse name: Not on file    Number of children: Not on file    Years of education: Not on file    Highest education level: Not on file   Occupational History    Not on file   Tobacco Use    Smoking status: Former Smoker     Packs/day: 0.50     Years: 22.00     Pack years: 11.00     Start date:      Quit date: 2015     Years since quittin.5    Smokeless tobacco: Never Used    Tobacco comment: quit smoking 2015   Vaping Use    Vaping Use: Never used   Substance and Sexual Activity    Alcohol use: Not Currently    Drug use: No    Sexual activity: Not on file   Other Topics Concern    Not on file   Social History Narrative    Not on file     Social Determinants of Health     Financial Resource Strain:     Difficulty of Paying Living Expenses:    Food Insecurity:     Worried About Running Out of Food in the Last Year:     [address] in the Last Year:    Transportation Needs:     Lack of Transportation (Medical):      Lack of Transportation (Non-Medical):    Physical Activity:     Days of Exercise per Week:     Minutes of Exercise per Session:    Stress:     Feeling of Stress :    Social Connections:     Frequency of Communication with Friends and Family:     Frequency of Social Gatherings with Friends and Family:     Attends [employer] Services:     Active Member of Clubs or Organizations:     Attends Club or Organization Meetings:     Marital Status:    Intimate Partner Violence:     Fear of Current or Ex-Partner:     Emotionally Abused:     Physically Abused:     Sexually Abused:        A complete 10 system review was performed and are otherwise negative unless mentioned in the above HPI. Specific negatives are listed below but may not include all those reviewed.     General ROS: negative obtundation, AMS  ENT ROS: negative rhinorrhea, epistaxis  Allergy and Immunology ROS: negative itchy/watery eyes or nasal congestion  Hematological and Lymphatic ROS: negative spontaneous bleeding or bruising  Endocrine ROS: negative  lethargy, mood swings, palpitations or polydipsia/polyuria  Respiratory ROS: negative sputum changes, stridor, tachypnea or wheezing  Cardiovascular ROS: negative for - loss of consciousness, murmur or orthopnea  Gastrointestinal ROS: negative for - hematochezia or hematemesis  Genito-Urinary ROS: negative for -  genital discharge or hematuria  Musculoskeletal ROS: negative for - focal weakness, gangrene  Psych/Neuro ROS: negative for - visual or auditory hallucinations, suicidal ideation        Physical exam:   /63 (Site: Right Lower Arm, Position: Sitting, Cuff Size: Large Adult)   Pulse 70   Temp 97.3 °F (36.3 °C) (Temporal)   Resp 20   Ht 5' 7\" (1.702 m)   Wt 228 lb (103.4 kg)   BMI 35.71 kg/m²    General appearance: alert, appears stated age and cooperative  Head: Normocephalic, without obvious abnormality, atraumatic  Neck: no adenopathy, supple, symmetrical, trachea midline and thyroid not enlarged, symmetric, no tenderness/mass/nodules  Lungs: clear to auscultation bilaterally  Heart: regular rate and rhythm  Abdomen: soft, non-tender; bowel sounds normal; no masses,  no organomegaly  Extremities: extremities normal, atraumatic, no cyanosis or edema    CT done: 6/2021:   Impression   1. There is no acute intra-abdominal or intrapelvic pathology. 2. Previous gastric bypass surgery. Fay Cheeks are no findings of bowel   obstruction.  There are no  inflammatory changes seen within the epigastric   region and there is no left upper quadrant fluid collection. Assessment: Post Maty-en- Y Gastric Bypass. She does not complain of GERD,  does not have sleep apnea,  does not have diabetes,  does not have hypertension off medical treatment. Cholesterol and triglycerides are normal. Hypoglycemia likely related to prolonged fasting. Epigastric pain, suspect marginal ucler    Plan:  Cont carafate QID. Continue to eat a high protein, low calorie diet, eat small portions very slowly and chew well before swallowing. Drink plenty of water and fluids. Make sure to use fiber to keep the bowels regular. Try to exercise 7 days per week, maintain adequate variety and balance, pre-plan meals, increase intake of: vegetables, fluids and fiber. Always notify the clinic if you have any medical problems. Follow up in 6 months.     Cont to monitor hypoglcyemai    Cont PPi and carafate    Physician Signature: Electronically signed by Dr. Braden Brush MD

## 2021-08-06 DIAGNOSIS — M79.671 RIGHT FOOT PAIN: Primary | ICD-10-CM

## 2021-08-12 ENCOUNTER — OFFICE VISIT (OUTPATIENT)
Dept: CHIROPRACTIC MEDICINE | Age: 36
End: 2021-08-12
Payer: COMMERCIAL

## 2021-08-12 VITALS — HEART RATE: 61 BPM | OXYGEN SATURATION: 98 % | TEMPERATURE: 97.9 F | RESPIRATION RATE: 16 BRPM

## 2021-08-12 DIAGNOSIS — M54.04 PANNICULITIS AFFECTING REGIONS OF NECK AND BACK, THORACIC REGION: ICD-10-CM

## 2021-08-12 DIAGNOSIS — M62.830 MUSCLE SPASM OF BACK: ICD-10-CM

## 2021-08-12 DIAGNOSIS — M54.2 CERVICALGIA: Primary | ICD-10-CM

## 2021-08-12 DIAGNOSIS — M54.50 MIDLINE LOW BACK PAIN WITHOUT SCIATICA, UNSPECIFIED CHRONICITY: ICD-10-CM

## 2021-08-12 PROCEDURE — 98941 CHIROPRACT MANJ 3-4 REGIONS: CPT | Performed by: CHIROPRACTOR

## 2021-08-12 PROCEDURE — 97014 ELECTRIC STIMULATION THERAPY: CPT | Performed by: CHIROPRACTOR

## 2021-08-12 NOTE — PROGRESS NOTES
21  [facility] Covert : 1985 Sex: female  Age: 39 y.o. Chief Complaint   Patient presents with    Lower Back Pain     Lower back pain x2 weeks     Neck Pain     stiffness & soreness        HPI:   Clyde Longo returns today for continued care. No new injuries. She states she has been in school and working, has not had time to get back in. She is reporting aching type pains across the low back, between the shoulder blades and across the shoulders into the neck. No radiating pain to the extremities. Current Outpatient Medications:     sucralfate (CARAFATE) 1 GM tablet, Take 1 tablet by mouth 4 times daily, Disp: 120 tablet, Rfl: 2    ondansetron (ZOFRAN ODT) 4 MG disintegrating tablet, Take 1 tablet by mouth every 8 hours as needed for Nausea or Vomiting, Disp: 24 tablet, Rfl: 0    topiramate (TOPAMAX) 25 MG tablet, Start 1 twice daily x 2 wks, then 1 in a.m. and 2 in p.m. x 2 wks, then 2 twice daily if needed, Disp: 124 tablet, Rfl: 5    eletriptan (RELPAX) 40 MG tablet, No more than one in 24 hrs., Disp: 6 tablet, Rfl: 3    Elagolix Sodium (ORILISSA) 200 MG TABS, Take 1 tablet by mouth 2 times daily, Disp: 60 tablet, Rfl: 3    vitamin D (ERGOCALCIFEROL) 1.25 MG (06291 UT) CAPS capsule, Take 1 capsule by mouth once a week, Disp: 12 capsule, Rfl: 0    vitamin D (ERGOCALCIFEROL) 1.25 MG (90241 UT) CAPS capsule, Take 1 capsule by mouth Twice a Week, Disp: 24 capsule, Rfl: 1    Exam:   Vitals:    21 1022   Pulse: 61   Resp: 16   Temp: 97.9 °F (36.6 °C)   SpO2: 98%     No apparent distress. Alert and oriented x3. No antalgia or list.  Ambulates without assistance  There is some midline tenderness L4-S1. There are hypertonic and tender fibers noted today in the lumbar, thoracic and cervical paraspinal muscles. Joint fixation is noted with motion screening at L3-4, bilateral SI joints, T4-6, C6-7. Fang El was seen today for lower back pain and neck pain.     Diagnoses and all orders for this visit:    Cervicalgia    Panniculitis affecting regions of neck and back, thoracic region    Muscle spasm of back    Midline low back pain without sciatica, unspecified chronicity        Treatment Plan:  EMS with heat to the cervicothoracic region for 15 minutes to address muscle spasm/hypertension and alleviate pain. Love flexion distraction manipulation at L3, protocol 1. Mechanically assisted manipulation of the SI joints. Diversified manipulation to listed cervical and thoracic segments. Tolerated well.   I will see her back for continued care as her schedule allows        Seen By:  Alva Gibson

## 2021-08-30 ENCOUNTER — OFFICE VISIT (OUTPATIENT)
Dept: FAMILY MEDICINE CLINIC | Age: 36
End: 2021-08-30
Payer: COMMERCIAL

## 2021-08-30 VITALS
SYSTOLIC BLOOD PRESSURE: 110 MMHG | BODY MASS INDEX: 36.02 KG/M2 | OXYGEN SATURATION: 98 % | HEIGHT: 67 IN | HEART RATE: 78 BPM | DIASTOLIC BLOOD PRESSURE: 64 MMHG | TEMPERATURE: 97.8 F

## 2021-08-30 DIAGNOSIS — B02.9 HERPES ZOSTER WITHOUT COMPLICATION: Primary | ICD-10-CM

## 2021-08-30 DIAGNOSIS — L30.9 DERMATITIS: ICD-10-CM

## 2021-08-30 PROCEDURE — 99213 OFFICE O/P EST LOW 20 MIN: CPT | Performed by: FAMILY MEDICINE

## 2021-08-30 RX ORDER — CLOTRIMAZOLE AND BETAMETHASONE DIPROPIONATE 10; .64 MG/G; MG/G
CREAM TOPICAL
Qty: 45 G | Refills: 2 | Status: SHIPPED
Start: 2021-08-30 | End: 2021-12-10

## 2021-08-30 RX ORDER — VALACYCLOVIR HYDROCHLORIDE 1 G/1
1000 TABLET, FILM COATED ORAL 3 TIMES DAILY
Qty: 30 TABLET | Refills: 0 | Status: SHIPPED | OUTPATIENT
Start: 2021-08-30 | End: 2021-09-09

## 2021-08-30 ASSESSMENT — ENCOUNTER SYMPTOMS
SINUS PAIN: 0
ABDOMINAL PAIN: 0
CHEST TIGHTNESS: 0
SORE THROAT: 0
NAUSEA: 0
EYE PAIN: 0
DIARRHEA: 0
COUGH: 0
VOMITING: 0
BACK PAIN: 0
SHORTNESS OF BREATH: 0
WHEEZING: 0
CONSTIPATION: 0
TROUBLE SWALLOWING: 0

## 2021-08-30 ASSESSMENT — PATIENT HEALTH QUESTIONNAIRE - PHQ9
2. FEELING DOWN, DEPRESSED OR HOPELESS: 0
1. LITTLE INTEREST OR PLEASURE IN DOING THINGS: 0
SUM OF ALL RESPONSES TO PHQ QUESTIONS 1-9: 0
SUM OF ALL RESPONSES TO PHQ9 QUESTIONS 1 & 2: 0

## 2021-08-30 NOTE — PROGRESS NOTES
[place] Covert (:  1985) is a 39 y.o. female,Established patient, here for evaluation of the following chief complaint(s):  Rash         ASSESSMENT/PLAN:  1. Herpes zoster without complication  Comments:  valtrex tid x 10 days  2. Dermatitis  Comments:  clotrimazole as needed for the itch  f/u in 4 to 5 days if not getting better      Return if symptoms worsen or fail to improve. Subjective   SUBJECTIVE/OBJECTIVE:  Patient here with rash on chest betweem breasts  Started over a week ago  Was itchy at first but now painful, burns, feels like a really bad sun burn and it is spreading  Now going done on abdomen and under breasts a little but also up chest wall inbewtten breast    No blistery areas but when it first started she thinks there were blistery areas    Has tried multiple over the counter creams and not getting better it is getting worse  Spreading          Review of Systems   Constitutional: Negative for appetite change, fatigue and fever. HENT: Negative for congestion, ear pain, sinus pain, sore throat and trouble swallowing. Eyes: Negative for pain. Respiratory: Negative for cough, chest tightness, shortness of breath and wheezing. Cardiovascular: Negative for chest pain, palpitations and leg swelling. Gastrointestinal: Negative for abdominal pain, constipation, diarrhea, nausea and vomiting. Endocrine: Negative for cold intolerance and heat intolerance. Genitourinary: Negative for difficulty urinating, hematuria and pelvic pain. Musculoskeletal: Negative for back pain, gait problem and joint swelling. Skin: Positive for rash. Negative for wound. Neurological: Negative for dizziness, syncope and headaches. Hematological: Negative for adenopathy. Psychiatric/Behavioral: Negative for confusion, sleep disturbance and suicidal ideas. Objective   Physical Exam  Vitals and nursing note reviewed.    Constitutional:       General: She is not in acute distress. Appearance: Normal appearance. She is well-developed. She is not ill-appearing. HENT:      Head: Normocephalic and atraumatic. Eyes:      Pupils: Pupils are equal, round, and reactive to light. Cardiovascular:      Rate and Rhythm: Normal rate and regular rhythm. Pulmonary:      Effort: Pulmonary effort is normal.      Breath sounds: Normal breath sounds. Musculoskeletal:      Cervical back: Normal range of motion. Skin:     General: Skin is warm and dry. Findings: Rash present. Comments: Rash as described above   Neurological:      Mental Status: She is alert. An electronic signature was used to authenticate this note.     --Sony Nathan DO

## 2021-10-21 ENCOUNTER — OFFICE VISIT (OUTPATIENT)
Dept: PRIMARY CARE CLINIC | Age: 36
End: 2021-10-21
Payer: COMMERCIAL

## 2021-10-21 VITALS
SYSTOLIC BLOOD PRESSURE: 124 MMHG | OXYGEN SATURATION: 97 % | TEMPERATURE: 97.5 F | HEIGHT: 67 IN | DIASTOLIC BLOOD PRESSURE: 70 MMHG | WEIGHT: 239 LBS | BODY MASS INDEX: 37.51 KG/M2 | HEART RATE: 81 BPM

## 2021-10-21 DIAGNOSIS — Z00.01 ENCOUNTER FOR WELL ADULT EXAM WITH ABNORMAL FINDINGS: Primary | ICD-10-CM

## 2021-10-21 PROBLEM — G25.81 RESTLESS LEGS: Status: ACTIVE | Noted: 2020-12-14

## 2021-10-21 PROBLEM — Z98.84 S/P LAPAROSCOPIC SLEEVE GASTRECTOMY: Status: ACTIVE | Noted: 2017-06-12

## 2021-10-21 PROBLEM — Z87.891 EX-SMOKER: Status: ACTIVE | Noted: 2021-10-21

## 2021-10-21 PROBLEM — R53.83 FATIGUE: Status: ACTIVE | Noted: 2017-08-07

## 2021-10-21 PROBLEM — N63.0 BREAST LUMP: Status: ACTIVE | Noted: 2021-10-21

## 2021-10-21 PROBLEM — M19.90 ARTHRITIS: Status: ACTIVE | Noted: 2017-08-07

## 2021-10-21 PROBLEM — Z98.84 HISTORY OF GASTRIC BYPASS: Status: ACTIVE | Noted: 2021-10-21

## 2021-10-21 PROBLEM — F32.9 MAJOR DEPRESSIVE DISORDER, SINGLE EPISODE, UNSPECIFIED: Status: ACTIVE | Noted: 2018-07-10

## 2021-10-21 PROBLEM — Z98.84 HISTORY OF BARIATRIC SURGERY: Status: ACTIVE | Noted: 2021-03-05

## 2021-10-21 PROBLEM — F41.9 ANXIETY: Status: ACTIVE | Noted: 2021-05-25

## 2021-10-21 PROCEDURE — 99395 PREV VISIT EST AGE 18-39: CPT | Performed by: FAMILY MEDICINE

## 2021-10-21 RX ORDER — FLUTICASONE PROPIONATE 50 MCG
SPRAY, SUSPENSION (ML) NASAL
COMMUNITY

## 2021-10-21 RX ORDER — CITALOPRAM 40 MG/1
40 TABLET ORAL DAILY
Qty: 30 TABLET | Refills: 5 | Status: SHIPPED
Start: 2021-10-21 | End: 2022-09-21 | Stop reason: SDUPTHER

## 2021-10-21 RX ORDER — BUSPIRONE HYDROCHLORIDE 5 MG/1
5 TABLET ORAL 3 TIMES DAILY
Qty: 90 TABLET | Refills: 0 | Status: SHIPPED
Start: 2021-10-21 | End: 2021-11-29

## 2021-10-21 RX ORDER — CITALOPRAM 20 MG/1
TABLET ORAL
COMMUNITY
End: 2021-10-21 | Stop reason: SDUPTHER

## 2021-10-21 RX ORDER — LORATADINE 10 MG/1
TABLET ORAL
COMMUNITY

## 2021-10-21 ASSESSMENT — ENCOUNTER SYMPTOMS
DIARRHEA: 0
SORE THROAT: 0
ABDOMINAL PAIN: 0
COUGH: 0
BACK PAIN: 0
VOMITING: 0
BLOOD IN STOOL: 0
SHORTNESS OF BREATH: 0
PHOTOPHOBIA: 0
NAUSEA: 0
CONSTIPATION: 0

## 2021-10-21 NOTE — PROGRESS NOTES
Well Adult Note  Name: Jocelyn Paniagua Covert KMZDBN Date: 10/21/2021   MRN: [id number] Sex: Female   Age: 39 y.o. Ethnicity: Non- / Non    : 1985 Race: White (non-)      Jocelyn Paniagua Coverhanane is here for well adult exam.  History:  Is today to establish with new PCP. Past medical history significant for anxiety, PCOS, and endometriosis. Follows with OB/GYN fairly frequently. Probably issue at this time is that she would like to have her Celexa increased and potentially a as needed medication. Currently going to school issues with increased anxiety secondary to work and school stress in addition to family related issues. Also has concerns about possible adult ADHD. Slight worsening of migraines secondary to anxiety. Review of Systems   Constitutional: Negative for chills and fever. HENT: Negative for congestion, hearing loss, nosebleeds and sore throat. Eyes: Negative for photophobia. Respiratory: Negative for cough and shortness of breath. Cardiovascular: Negative for chest pain, palpitations and leg swelling. Gastrointestinal: Negative for abdominal pain, blood in stool, constipation, diarrhea, nausea and vomiting. Endocrine: Negative for polydipsia. Genitourinary: Negative for dysuria, frequency, hematuria and urgency. Musculoskeletal: Negative for back pain and myalgias. Skin: Negative. Neurological: Positive for headaches. Negative for dizziness, tremors and weakness. Hematological: Does not bruise/bleed easily. Psychiatric/Behavioral: Positive for decreased concentration. Negative for hallucinations and suicidal ideas. The patient is nervous/anxious. All other systems reviewed and are negative. Allergies   Allergen Reactions    Ciprofloxacin Anaphylaxis         Prior to Visit Medications    Medication Sig Taking?  Authorizing Provider   diclofenac sodium (VOLTAREN) 1 % GEL  Yes Historical Provider, MD   fluticasone (FLONASE) 50 MCG/ACT nasal spray  Yes Historical Provider, MD   loratadine (CLARITIN) 10 MG tablet  Yes Historical Provider, MD   citalopram (CELEXA) 40 MG tablet Take 1 tablet by mouth daily Yes Clovis Acosta,    busPIRone (BUSPAR) 5 MG tablet Take 1 tablet by mouth 3 times daily Yes Clovis Acosta, DO   clotrimazole-betamethasone (LOTRISONE) 1-0.05 % cream Apply topically 2 times daily. Yes Kate Villafana, DO   Elagolix Sodium (ORILISSA) 150 MG TABS Take 150 mg by mouth daily Yes Angelica Flores MD   sucralfate (CARAFATE) 1 GM tablet Take 1 tablet by mouth 4 times daily Yes Elaine Arevalo MD   ondansetron (ZOFRAN ODT) 4 MG disintegrating tablet Take 1 tablet by mouth every 8 hours as needed for Nausea or Vomiting Yes Criselda Asher, DO   topiramate (TOPAMAX) 25 MG tablet Start 1 twice daily x 2 wks, then 1 in a.m. and 2 in p.m. x 2 wks, then 2 twice daily if needed  Patient taking differently: Take 25 mg by mouth daily  Yes Brionna Hyde MD   eletriptan (RELPAX) 40 MG tablet No more than one in 24 hrs.  Yes Brionna Hyde MD   vitamin D (ERGOCALCIFEROL) 1.25 MG (30756 UT) CAPS capsule Take 1 capsule by mouth once a week Yes Elaine Arevalo MD   vitamin D (ERGOCALCIFEROL) 1.25 MG (70672 UT) CAPS capsule Take 1 capsule by mouth Twice a Week Yes Elaine Arevalo MD         Past Medical History:   Diagnosis Date    Allergic rhinitis     history    Anesthesia complication     pulse ox dropped    Arthritis     Chronic mixed headache syndrome 4/23/2021    Chronic sinusitis     Chronic tension-type headache, intractable 1/24/2020    Constipation     Depression with anxiety     Endometriosis     Folic acid deficiency 7/88/0687    Migraine headache 1/24/2020    Morbidly obese ([address]     PCOS (polycystic ovarian syndrome) 1/24/2020    Prolonged emergence from general anesthesia     RLS (restless legs syndrome)     Vitamin D deficiency 1/24/2020       Past Surgical History:   Procedure icterus. Conjunctiva/sclera: Conjunctivae normal.      Pupils: Pupils are equal, round, and reactive to light. Neck:      Thyroid: No thyromegaly. Cardiovascular:      Rate and Rhythm: Normal rate and regular rhythm. Heart sounds: Normal heart sounds. No murmur heard. Pulmonary:      Effort: Pulmonary effort is normal.      Breath sounds: Normal breath sounds. No rales. Abdominal:      General: Bowel sounds are normal. There is no distension. Palpations: Abdomen is soft. Tenderness: There is no abdominal tenderness. Musculoskeletal:         General: Normal range of motion. Cervical back: Neck supple. Lymphadenopathy:      Cervical: No cervical adenopathy. Skin:     General: Skin is warm and dry. Findings: No erythema or rash. Neurological:      Mental Status: She is alert and oriented to person, place, and time. Cranial Nerves: No cranial nerve deficit. Psychiatric:         Judgment: Judgment normal.           Assessment   Plan   1. Encounter for well adult exam with abnormal findings  -     citalopram (CELEXA) 40 MG tablet; Take 1 tablet by mouth daily, Disp-30 tablet, R-5Normal  -     busPIRone (BUSPAR) 5 MG tablet; Take 1 tablet by mouth 3 times daily, Disp-90 tablet, R-0Normal  -     CBC Auto Differential; Future  -     Hepatic Function Panel; Future  -     Basic Metabolic Panel; Future  -     Lipid Panel; Future  -     Hemoglobin A1C; Future  -     T4, Free; Future  -     TSH without Reflex; Future  -     Thrombophilia Panel     At this time we will order baseline labs. Increase her Celexa to 40 mg daily and BuSpar 3 times daily as needed. Labs ordered today in addition to thrombophilia testing there is no family history of factor V 13 issues.     Personalized Preventive Plan   Current Health Maintenance Status  Immunization History   Administered Date(s) Administered    COVID-19, Moderna, PF, 100mcg/0.5mL 03/26/2021, 05/07/2021    Influenza Virus Vaccine

## 2021-10-21 NOTE — PATIENT INSTRUCTIONS
Well Visit, Ages 25 to 48: Care Instructions  Overview     Well visits can help you stay healthy. Your doctor has checked your overall health and may have suggested ways to take good care of yourself. Your doctor also may have recommended tests. At home, you can help prevent illness with healthy eating, regular exercise, and other steps. Follow-up care is a key part of your treatment and safety. Be sure to make and go to all appointments, and call your doctor if you are having problems. It's also a good idea to know your test results and keep a list of the medicines you take. How can you care for yourself at home? · Get screening tests that you and your doctor decide on. Screening helps find diseases before any symptoms appear. · Eat healthy foods. Choose fruits, vegetables, whole grains, protein, and low-fat dairy foods. Limit fat, especially saturated fat. Reduce salt in your diet. · Limit alcohol. If you are a man, have no more than 2 drinks a day or 14 drinks a week. If you are a woman, have no more than 1 drink a day or 7 drinks a week. · Get at least 30 minutes of physical activity on most days of the week. Walking is a good choice. You also may want to do other activities, such as running, swimming, cycling, or playing tennis or team sports. Discuss any changes in your exercise program with your doctor. · Reach and stay at a healthy weight. This will lower your risk for many problems, such as obesity, diabetes, heart disease, and high blood pressure. · Do not smoke or allow others to smoke around you. If you need help quitting, talk to your doctor about stop-smoking programs and medicines. These can increase your chances of quitting for good. · Care for your mental health. It is easy to get weighed down by worry and stress. Learn strategies to manage stress, like deep breathing and mindfulness, and stay connected with your family and community.  If you find you often feel sad or hopeless, talk with your doctor. Treatment can help. · Talk to your doctor about whether you have any risk factors for sexually transmitted infections (STIs). You can help prevent STIs if you wait to have sex with a new partner (or partners) until you've each been tested for STIs. It also helps if you use condoms (male or female condoms) and if you limit your sex partners to one person who only has sex with you. Vaccines are available for some STIs, such as HPV. · Use birth control if it's important to you to prevent pregnancy. Talk with your doctor about the choices available and what might be best for you. · If you think you may have a problem with alcohol or drug use, talk to your doctor. This includes prescription medicines (such as amphetamines and opioids) and illegal drugs (such as cocaine and methamphetamine). Your doctor can help you figure out what type of treatment is best for you. · Protect your skin from too much sun. When you're outdoors from 10 a.m. to 4 p.m., stay in the shade or cover up with clothing and a hat with a wide brim. Wear sunglasses that block UV rays. Even when it's cloudy, put broad-spectrum sunscreen (SPF 30 or higher) on any exposed skin. · See a dentist one or two times a year for checkups and to have your teeth cleaned. · Wear a seat belt in the car. When should you call for help? Watch closely for changes in your health, and be sure to contact your doctor if you have any problems or symptoms that concern you. Where can you learn more? Go to [email or web address] and sign in to your [employer] account. Enter P072 in the [facility] box to learn more about \"Well Visit, Ages 25 to 48: Care Instructions. \"     If you do not have an account, please click on the \"Sign Up Now\" link. Current as of: February 11, 2021               Content Version: 13.0  © 7880-4881 Healthwise, Incorporated. Care instructions adapted under license by [facility] ([facility]).  If you have questions about a medical condition or this instruction, always ask your healthcare professional. [place] [address] any warranty or liability for your use of this information.

## 2021-11-03 DIAGNOSIS — Z00.01 ENCOUNTER FOR WELL ADULT EXAM WITH ABNORMAL FINDINGS: ICD-10-CM

## 2021-11-03 LAB
ALBUMIN SERPL-MCNC: 3.8 G/DL (ref 3.5–5.2)
ALP BLD-CCNC: 86 U/L (ref 35–104)
ALT SERPL-CCNC: 18 U/L (ref 0–32)
ANION GAP SERPL CALCULATED.3IONS-SCNC: 14 MMOL/L (ref 7–16)
AST SERPL-CCNC: 22 U/L (ref 0–31)
BASOPHILS ABSOLUTE: 0.02 E9/L (ref 0–0.2)
BASOPHILS RELATIVE PERCENT: 0.3 % (ref 0–2)
BILIRUB SERPL-MCNC: 0.3 MG/DL (ref 0–1.2)
BILIRUBIN DIRECT: <0.2 MG/DL (ref 0–0.3)
BILIRUBIN, INDIRECT: ABNORMAL MG/DL (ref 0–1)
BUN BLDV-MCNC: 16 MG/DL (ref 6–20)
CALCIUM SERPL-MCNC: 8.8 MG/DL (ref 8.6–10.2)
CHLORIDE BLD-SCNC: 101 MMOL/L (ref 98–107)
CHOLESTEROL, TOTAL: 161 MG/DL (ref 0–199)
CO2: 24 MMOL/L (ref 22–29)
CREAT SERPL-MCNC: 0.8 MG/DL (ref 0.5–1)
EOSINOPHILS ABSOLUTE: 0.1 E9/L (ref 0.05–0.5)
EOSINOPHILS RELATIVE PERCENT: 1.6 % (ref 0–6)
GFR AFRICAN AMERICAN: >60
GFR NON-AFRICAN AMERICAN: >60 ML/MIN/1.73
GLUCOSE BLD-MCNC: 79 MG/DL (ref 74–99)
HBA1C MFR BLD: 4.8 % (ref 4–5.6)
HCT VFR BLD CALC: 39 % (ref 34–48)
HDLC SERPL-MCNC: 56 MG/DL
HEMOGLOBIN: 12.5 G/DL (ref 11.5–15.5)
IMMATURE GRANULOCYTES #: 0.01 E9/L
IMMATURE GRANULOCYTES %: 0.2 % (ref 0–5)
LDL CHOLESTEROL CALCULATED: 90 MG/DL (ref 0–99)
LYMPHOCYTES ABSOLUTE: 1.75 E9/L (ref 1.5–4)
LYMPHOCYTES RELATIVE PERCENT: 27.1 % (ref 20–42)
MCH RBC QN AUTO: 28.6 PG (ref 26–35)
MCHC RBC AUTO-ENTMCNC: 32.1 % (ref 32–34.5)
MCV RBC AUTO: 89.2 FL (ref 80–99.9)
MONOCYTES ABSOLUTE: 0.46 E9/L (ref 0.1–0.95)
MONOCYTES RELATIVE PERCENT: 7.1 % (ref 2–12)
NEUTROPHILS ABSOLUTE: 4.11 E9/L (ref 1.8–7.3)
NEUTROPHILS RELATIVE PERCENT: 63.7 % (ref 43–80)
PDW BLD-RTO: 13.4 FL (ref 11.5–15)
PLATELET # BLD: 305 E9/L (ref 130–450)
PMV BLD AUTO: 10.2 FL (ref 7–12)
POTASSIUM SERPL-SCNC: 3.9 MMOL/L (ref 3.5–5)
RBC # BLD: 4.37 E12/L (ref 3.5–5.5)
SODIUM BLD-SCNC: 139 MMOL/L (ref 132–146)
T4 FREE: 1.11 NG/DL (ref 0.93–1.7)
TOTAL PROTEIN: 6.3 G/DL (ref 6.4–8.3)
TRIGL SERPL-MCNC: 77 MG/DL (ref 0–149)
TSH SERPL DL<=0.05 MIU/L-ACNC: 1.5 UIU/ML (ref 0.27–4.2)
VLDLC SERPL CALC-MCNC: 15 MG/DL
WBC # BLD: 6.5 E9/L (ref 4.5–11.5)

## 2021-11-09 ENCOUNTER — TELEPHONE (OUTPATIENT)
Dept: FAMILY MEDICINE CLINIC | Age: 36
End: 2021-11-09

## 2021-11-09 DIAGNOSIS — Z82.49 FAMILY HISTORY OF BLOOD CLOTS: Primary | ICD-10-CM

## 2021-11-10 DIAGNOSIS — K91.2 MALNUTRITION FOLLOWING GASTROINTESTINAL SURGERY: ICD-10-CM

## 2021-11-18 LAB — THROMBOPHILIA DNA ASSAY: NORMAL

## 2021-11-19 ENCOUNTER — TELEPHONE (OUTPATIENT)
Dept: BARIATRICS/WEIGHT MGMT | Age: 36
End: 2021-11-19

## 2021-11-19 ENCOUNTER — TELEPHONE (OUTPATIENT)
Dept: FAMILY MEDICINE CLINIC | Age: 36
End: 2021-11-19

## 2021-11-19 NOTE — TELEPHONE ENCOUNTER
I discussed case with Dr. Kailee Zhu. Patient is fine starting baby ASA daily. No need for any PPI. Call to patient and she voiced understanding.

## 2021-11-20 PROBLEM — Z00.01 ENCOUNTER FOR WELL ADULT EXAM WITH ABNORMAL FINDINGS: Status: RESOLVED | Noted: 2020-02-18 | Resolved: 2021-11-20

## 2021-11-24 ENCOUNTER — PATIENT MESSAGE (OUTPATIENT)
Dept: PRIMARY CARE CLINIC | Age: 36
End: 2021-11-24

## 2021-11-24 DIAGNOSIS — F90.9 ATTENTION DEFICIT HYPERACTIVITY DISORDER (ADHD), UNSPECIFIED ADHD TYPE: Primary | ICD-10-CM

## 2021-11-29 DIAGNOSIS — Z00.01 ENCOUNTER FOR WELL ADULT EXAM WITH ABNORMAL FINDINGS: ICD-10-CM

## 2021-11-29 RX ORDER — BUSPIRONE HYDROCHLORIDE 5 MG/1
TABLET ORAL
Qty: 90 TABLET | Refills: 0 | Status: SHIPPED | OUTPATIENT
Start: 2021-11-29

## 2021-11-30 RX ORDER — ATOMOXETINE 40 MG/1
40 CAPSULE ORAL DAILY
Qty: 30 CAPSULE | Refills: 3 | Status: SHIPPED
Start: 2021-11-30 | End: 2022-09-21 | Stop reason: SDUPTHER

## 2021-12-08 PROBLEM — F90.2 ATTENTION DEFICIT HYPERACTIVITY DISORDER (ADHD), COMBINED TYPE: Status: ACTIVE | Noted: 2021-12-08

## 2021-12-10 ENCOUNTER — OFFICE VISIT (OUTPATIENT)
Dept: FAMILY MEDICINE CLINIC | Age: 36
End: 2021-12-10
Payer: COMMERCIAL

## 2021-12-10 VITALS
SYSTOLIC BLOOD PRESSURE: 92 MMHG | HEART RATE: 67 BPM | BODY MASS INDEX: 35.71 KG/M2 | OXYGEN SATURATION: 97 % | DIASTOLIC BLOOD PRESSURE: 54 MMHG | WEIGHT: 228 LBS | TEMPERATURE: 97.5 F

## 2021-12-10 DIAGNOSIS — G43.109 MIGRAINE WITH AURA AND WITHOUT STATUS MIGRAINOSUS, NOT INTRACTABLE: Primary | ICD-10-CM

## 2021-12-10 PROCEDURE — 99213 OFFICE O/P EST LOW 20 MIN: CPT | Performed by: PHYSICIAN ASSISTANT

## 2021-12-10 PROCEDURE — 96372 THER/PROPH/DIAG INJ SC/IM: CPT | Performed by: PHYSICIAN ASSISTANT

## 2021-12-10 RX ORDER — KETOROLAC TROMETHAMINE 30 MG/ML
60 INJECTION, SOLUTION INTRAMUSCULAR; INTRAVENOUS ONCE
Status: COMPLETED | OUTPATIENT
Start: 2021-12-10 | End: 2021-12-10

## 2021-12-10 RX ADMIN — KETOROLAC TROMETHAMINE 60 MG: 30 INJECTION, SOLUTION INTRAMUSCULAR; INTRAVENOUS at 12:44

## 2021-12-10 NOTE — PROGRESS NOTES
Chief Complaint   Migraine (x 1 week, some photophobia, nausea)      History of Present Illness   Source of history provided by: patient. Jemma Modi Covert is a 39 y.o. old female presenting to the walk in clinic for evaluation of a migraine headache which has been present for the past week. Headache is located over the right temporal region. Report associated photophobia, phonophobia, and nausea. Denies vomiting episodes. Since recognized, the symptoms have been intermittent. Pt report having headaches like this in the past. Denies this being the worst HA of his/her life. Pt denies any recent falls, trauma, dizziness, syncope, CP, SOB, palpitations, nasal congestion, visual loss, unilateral weakness, fever, neck stiffness, or recent illness. She has tried taking Tylenol, ibuprofen, and Relpax without relief. Patient is also on Topamax daily for prophylaxis. ROS    Unless otherwise stated in this report or unable to obtain because of the patient's clinical or mental status as evidenced by the medical record, this patients's positive and negative responses for Review of Systems, constitutional, psych, eyes, ENT, cardiovascular, respiratory, gastrointestinal, neurological, genitourinary, musculoskeletal, integument systems and systems related to the presenting problem are either stated in the preceding or were not pertinent or were negative for the symptoms and/or complaints related to the medical problem. Past Medical History:  has a past medical history of Allergic rhinitis, Anesthesia complication, Arthritis, Chronic mixed headache syndrome, Chronic sinusitis, Chronic tension-type headache, intractable, Constipation, Depression with anxiety, Endometriosis, Folic acid deficiency, Migraine headache, Morbidly obese ([address] PCOS (polycystic ovarian syndrome), Prolonged emergence from general anesthesia, RLS (restless legs syndrome), and Vitamin D deficiency.   Past Surgical History:  has a past surgical history that includes Cholecystectomy (); hysteroscopy (2015); Sleeve Gastrectomy (); Appendectomy ();  section (); Dilation and curettage of uterus (); Tubal ligation (); Upper gastrointestinal endoscopy (N/A, 2020); Colonoscopy; Maty-en-Y Gastric Bypass (N/A, 2020); and Achilles tendon surgery (Right, 2020). Social History:  reports that she quit smoking about 6 years ago. She started smoking about 28 years ago. She has a 11.00 pack-year smoking history. She has never used smokeless tobacco. She reports previous alcohol use. She reports that she does not use drugs. Family History: family history includes Arthritis in her father; COPD in her maternal grandmother; Cancer in her maternal grandfather and maternal grandmother; Diabetes in her maternal grandfather and maternal grandmother; Elevated Lipids in her maternal grandmother; Heart Disease in her maternal grandfather; High Blood Pressure in her mother; High Cholesterol in her mother; Hypertension in her maternal grandmother; No Known Problems in her sister. Allergies: Ciprofloxacin    Physical Exam         VS:  BP (!) 92/54 (Site: Left Upper Arm, Position: Sitting)   Pulse 67   Temp 97.5 °F (36.4 °C) (Temporal)   Wt 228 lb (103.4 kg)   SpO2 97%   BMI 35.71 kg/m²    Oxygen Saturation Interpretation: Normal.    Constitutional:  Level of Consciousness: Alert, gives appropriate history, ambulated self to the room. Eyes:  PERRL, EOMI, no discharge or conjunctival injection. Mild photophobia noted. Ears:  External ears without lesions. TM's clear bilaterally. Throat:  Pharynx without injection, exudate, or tonsillar hypertrophy. Airway patient. Neck:  Normal ROM. Supple. Lungs:  Clear to auscultation and breath sounds equal.  Heart:  Regular rate and rhythm, normal heart sounds, without pathological murmurs, ectopy, gallops, or rubs. Skin:  Normal turgor.   Warm, dry, without visible rash, unless noted elsewhere. Neurological:  Oriented. Motor functions intact. CN II-XII intact. No nystagmus noted. Ambulates without ataxia. UE/LE/ strength 5/5 bilaterally. Lab / Imaging Results   (All laboratory and radiology results have been personally reviewed by myself)  Labs:  No results found for this visit on 12/10/21. Imaging: All Radiology results interpreted by Radiologist unless otherwise noted. Assessment / Plan     Impression(s):  Cassidy Taylor was seen today for migraine. Diagnoses and all orders for this visit:    Migraine with aura and without status migrainosus, not intractable  -     ketorolac (TORADOL) injection 60 mg        Disposition:  Disposition: Discharge to home. Toward injection administered in office, potential reactions discussed. Increase fluid intake and rest in a dark, quiet place. PCP in 3-5 days if symptoms persist. ED sooner if symptoms worsen or change. ED immediately with severe or worsening HA, weakness, paresthesias, visual loss/changes, vomiting, fever, neck stiffness, or worst HA of life. Pt states understanding and is in agreement with this care plan. All questions answered. Orlando Dee PA-C    **This report was transcribed using voice recognition software. Every effort was made to ensure accuracy; however, inadvertent computerized transcription errors may be present.

## 2021-12-16 ENCOUNTER — OFFICE VISIT (OUTPATIENT)
Dept: CHIROPRACTIC MEDICINE | Age: 36
End: 2021-12-16
Payer: COMMERCIAL

## 2021-12-16 VITALS
OXYGEN SATURATION: 98 % | SYSTOLIC BLOOD PRESSURE: 126 MMHG | TEMPERATURE: 98.2 F | HEART RATE: 72 BPM | BODY MASS INDEX: 35.79 KG/M2 | HEIGHT: 67 IN | DIASTOLIC BLOOD PRESSURE: 64 MMHG | WEIGHT: 228 LBS

## 2021-12-16 DIAGNOSIS — M62.830 MUSCLE SPASM OF BACK: ICD-10-CM

## 2021-12-16 DIAGNOSIS — M54.2 CERVICALGIA: Primary | ICD-10-CM

## 2021-12-16 DIAGNOSIS — M54.04 PANNICULITIS AFFECTING REGIONS OF NECK AND BACK, THORACIC REGION: ICD-10-CM

## 2021-12-16 DIAGNOSIS — M54.50 MIDLINE LOW BACK PAIN WITHOUT SCIATICA, UNSPECIFIED CHRONICITY: ICD-10-CM

## 2021-12-16 PROCEDURE — 98941 CHIROPRACT MANJ 3-4 REGIONS: CPT | Performed by: CHIROPRACTOR

## 2021-12-16 PROCEDURE — 99999 PR OFFICE/OUTPT VISIT,PROCEDURE ONLY: CPT | Performed by: CHIROPRACTOR

## 2021-12-16 NOTE — PROGRESS NOTES
21  [facility] Randall Covert : 1985 Sex: female  Age: 39 y.o. Chief Complaint   Patient presents with    Back Pain     3 weeks     Neck Pain     3 weeks        HPI:   Keshawn Gregory returns today for continued care of neck and back complaints. She has no new issues or injuries. States over the past few weeks with her finals, she has noticed increased in tightness and stiffness. She states that the neck and upper back are definitely worse than the lower back. She denies radiating pain to the extremities today.       Current Outpatient Medications:     atomoxetine (STRATTERA) 40 MG capsule, Take 1 capsule by mouth daily, Disp: 30 capsule, Rfl: 3    busPIRone (BUSPAR) 5 MG tablet, TAKE ONE TABLET BY MOUTH 3 TIMES A DAY, Disp: 90 tablet, Rfl: 0    fluticasone (FLONASE) 50 MCG/ACT nasal spray, , Disp: , Rfl:     loratadine (CLARITIN) 10 MG tablet, , Disp: , Rfl:     citalopram (CELEXA) 40 MG tablet, Take 1 tablet by mouth daily, Disp: 30 tablet, Rfl: 5    Elagolix Sodium (ORILISSA) 150 MG TABS, Take 150 mg by mouth daily, Disp: 60 tablet, Rfl: 2    sucralfate (CARAFATE) 1 GM tablet, Take 1 tablet by mouth 4 times daily, Disp: 120 tablet, Rfl: 2    ondansetron (ZOFRAN ODT) 4 MG disintegrating tablet, Take 1 tablet by mouth every 8 hours as needed for Nausea or Vomiting, Disp: 24 tablet, Rfl: 0    topiramate (TOPAMAX) 25 MG tablet, Start 1 twice daily x 2 wks, then 1 in a.m. and 2 in p.m. x 2 wks, then 2 twice daily if needed (Patient taking differently: Take 25 mg by mouth daily ), Disp: 124 tablet, Rfl: 5    eletriptan (RELPAX) 40 MG tablet, No more than one in 24 hrs., Disp: 6 tablet, Rfl: 3    vitamin D (ERGOCALCIFEROL) 1.25 MG (66270 UT) CAPS capsule, Take 1 capsule by mouth once a week, Disp: 12 capsule, Rfl: 0    vitamin D (ERGOCALCIFEROL) 1.25 MG (82499 UT) CAPS capsule, Take 1 capsule by mouth Twice a Week, Disp: 24 capsule, Rfl: 1    Exam:   Vitals:    21 0906   BP: 126/64   Pulse: 72 Temp: 98.2 °F (36.8 °C)   SpO2: 98%     she appears well per no apparent distress. Alert and oriented x3. No antalgia or list.  Ambulating without assistance. There is no midline pain in spinal regions. There is mild loss of cervical active range of motion in all planes. Pain-free. Cervical compression and distraction are both negative. Kemps test is negative bilateral  There are hypertonic and tender fibers noted today in the cervical, thoracic and lumbar paraspinal muscles. Joint fixation is noted with motion screening at C4-6, T3-6, L4-5 and bilateral SI joints. Olga Malhotra was seen today for back pain and neck pain. Diagnoses and all orders for this visit:    Cervicalgia    Panniculitis affecting regions of neck and back, thoracic region    Midline low back pain without sciatica, unspecified chronicity    Muscle spasm of back        Treatment Plan: Continue treatment today-diversified manipulation to listed cervical and thoracic segments. Love flexion distraction manipulation at L4, protocol 2. Mechanically assisted manipulation of the SI joints. Tolerated well, noting relief following care. With her schedule, she can follow-up with me as needed.       Seen By:  Oanh Santana DC

## 2022-01-06 ENCOUNTER — OFFICE VISIT (OUTPATIENT)
Dept: FAMILY MEDICINE CLINIC | Age: 37
End: 2022-01-06
Payer: COMMERCIAL

## 2022-01-06 VITALS
HEART RATE: 78 BPM | SYSTOLIC BLOOD PRESSURE: 124 MMHG | RESPIRATION RATE: 18 BRPM | HEIGHT: 67 IN | DIASTOLIC BLOOD PRESSURE: 70 MMHG | OXYGEN SATURATION: 98 % | WEIGHT: 228 LBS | TEMPERATURE: 98.2 F | BODY MASS INDEX: 35.79 KG/M2

## 2022-01-06 DIAGNOSIS — H69.83 DYSFUNCTION OF BOTH EUSTACHIAN TUBES: Primary | ICD-10-CM

## 2022-01-06 PROCEDURE — 99213 OFFICE O/P EST LOW 20 MIN: CPT | Performed by: NURSE PRACTITIONER

## 2022-01-06 NOTE — PROGRESS NOTES
Chief Complaint:   Otalgia (bilateral ear pain. .patient states that she feels like needles are in her ears )    History of Present Illness   Source of history provided by:  patient. Robby Agarwalt is a 39 y.o. old female presenting to walk-in for evaluation of bilateral ear pain x 2 days. Reports associated nasal congestion, rhinorrhea, and sore throat. Denies any discharge from the ear canal. Has tried taking Tylenol OTC without relief. Denies any fever, chills, CP, SOB, abdominal pain, neck stiffness, rash, or lethargy. Review of Systems   Unless otherwise stated in this report or unable to obtain because of the patient's clinical or mental status as evidenced by the medical record, this patients's positive and negative responses for Review of Systems, constitutional, psych, eyes, ENT, cardiovascular, respiratory, gastrointestinal, neurological, genitourinary, musculoskeletal, integument systems and systems related to the presenting problem are either stated in the preceding or were not pertinent or were negative for the symptoms and/or complaints related to the medical problem. Past Medical History:  has a past medical history of Allergic rhinitis, Anesthesia complication, Arthritis, Chronic mixed headache syndrome, Chronic sinusitis, Chronic tension-type headache, intractable, Constipation, Depression with anxiety, Endometriosis, Folic acid deficiency, Migraine headache, Morbidly obese ([address] PCOS (polycystic ovarian syndrome), Prolonged emergence from general anesthesia, RLS (restless legs syndrome), and Vitamin D deficiency. Past Surgical History:  has a past surgical history that includes Cholecystectomy (); hysteroscopy (2015); Sleeve Gastrectomy (); Appendectomy ();  section (); Dilation and curettage of uterus (); Tubal ligation (); Upper gastrointestinal endoscopy (N/A, 2020);  Colonoscopy; Maty-en-Y Gastric Bypass (N/A, 2020); and Achilles tendon surgery (Right, 12/21/2020). Social History:  reports that she quit smoking about 6 years ago. She started smoking about 29 years ago. She has a 11.00 pack-year smoking history. She has never used smokeless tobacco. She reports previous alcohol use. She reports that she does not use drugs. Family History: family history includes Arthritis in her father; COPD in her maternal grandmother; Cancer in her maternal grandfather and maternal grandmother; Diabetes in her maternal grandfather and maternal grandmother; Elevated Lipids in her maternal grandmother; Heart Disease in her maternal grandfather; High Blood Pressure in her mother; High Cholesterol in her mother; Hypertension in her maternal grandmother; No Known Problems in her sister. Allergies: Ciprofloxacin    Physical Exam   Vital Signs:  /70   Pulse 78   Temp 98.2 °F (36.8 °C)   Resp 18   Ht 5' 7\" (1.702 m)   Wt 228 lb (103.4 kg)   SpO2 98%   BMI 35.71 kg/m²    Oxygen Saturation Interpretation: Normal.    Constitutional:  Alert, development consistent with age. Ears:  External Ears: Normal pinna bilaterally. TM's & External Canals: Bilateral TMs with serous effusion without erythema or perforation. Bilateral external canals clear without erythema, exudate or edema. Nose: There is no congestion of the nasal mucosa. Throat:  Posterior pharynx without injection, exudate, or tonsillar hypertrophy. Airway patient. Neck:  Normal ROM. Supple. No adenopathy. Respiratory:  CTAB without wheezing, rales, or rhonchi  CV: Regular rate and rhythm, normal heart sounds, without pathological murmurs, ectopy, gallops, or rubs. Skin:  Moist and warm without rashes or lesions. Lymphatic: No lymphangitis or adenopathy noted. Neurological:  Oriented. Motor functions intact.     Test Results Section   (All laboratory and radiology results have been personally reviewed by myself)  Labs:  No results found for this visit on 01/06/22. Assessment / Plan   Impression(s):  Nina Berger was seen today for otalgia. Diagnoses and all orders for this visit:    Dysfunction of both eustachian tubes    Patient advised OTC antihistamines daily for 2 weeks for symptomatic relief. Increase fluids and rest. Additional symptomatic relief discussed. F/u PCP in 5-7 days if symptoms persist. ED sooner if symptoms worsen or change. ED immediately with fever, worsening ear pain, CP, dyspnea, or dysphagia. Pt is in agreement with this care plan. All questions answered. Return if symptoms worsen or fail to improve. Electronically signed by COLLINS Cesar CNP   DD: 1/6/22    **This report was transcribed using voice recognition software. Every effort was made to ensure accuracy; however, inadvertent computerized transcription errors may be present.

## 2022-01-12 ENCOUNTER — OFFICE VISIT (OUTPATIENT)
Dept: FAMILY MEDICINE CLINIC | Age: 37
End: 2022-01-12
Payer: COMMERCIAL

## 2022-01-12 VITALS
OXYGEN SATURATION: 97 % | TEMPERATURE: 98.2 F | SYSTOLIC BLOOD PRESSURE: 118 MMHG | WEIGHT: 228 LBS | HEART RATE: 89 BPM | BODY MASS INDEX: 35.79 KG/M2 | DIASTOLIC BLOOD PRESSURE: 76 MMHG | RESPIRATION RATE: 18 BRPM | HEIGHT: 67 IN

## 2022-01-12 DIAGNOSIS — R30.0 DYSURIA: Primary | ICD-10-CM

## 2022-01-12 DIAGNOSIS — R30.0 DYSURIA: ICD-10-CM

## 2022-01-12 LAB
BILIRUBIN, POC: NEGATIVE
BLOOD URINE, POC: NEGATIVE
CLARITY, POC: NORMAL
COLOR, POC: YELLOW
GLUCOSE URINE, POC: NEGATIVE
KETONES, POC: NEGATIVE
LEUKOCYTE EST, POC: NEGATIVE
NITRITE, POC: NORMAL
PH, POC: 6.5
PROTEIN, POC: NEGATIVE
SPECIFIC GRAVITY, POC: >=1.03
UROBILINOGEN, POC: 1

## 2022-01-12 PROCEDURE — 99213 OFFICE O/P EST LOW 20 MIN: CPT | Performed by: FAMILY MEDICINE

## 2022-01-12 PROCEDURE — 81002 URINALYSIS NONAUTO W/O SCOPE: CPT | Performed by: FAMILY MEDICINE

## 2022-01-12 RX ORDER — CEFDINIR 300 MG/1
300 CAPSULE ORAL 2 TIMES DAILY
Qty: 14 CAPSULE | Refills: 0 | Status: SHIPPED | OUTPATIENT
Start: 2022-01-12 | End: 2022-01-19

## 2022-01-12 ASSESSMENT — ENCOUNTER SYMPTOMS
RESPIRATORY NEGATIVE: 1
ABDOMINAL PAIN: 1
BACK PAIN: 1

## 2022-01-12 NOTE — PROGRESS NOTES
Leta Luciano Covert (:  1985) is a 39 y.o. female,Established patient, here for evaluation of the following chief complaint(s):  Urinary Tract Infection (burning with urintation, frequent, painful )         ASSESSMENT/PLAN:  1. Dysuria  -     POCT Urinalysis no Micro  -     Culture, Urine; Future  -     cefdinir (OMNICEF) 300 MG capsule; Take 1 capsule by mouth 2 times daily for 7 days, Disp-14 capsule, R-0Normal    We will treat symptomatically. Culture obtained as well. Red flags occur she is to go directly to the emergency department for further evaluation and treatment. No follow-ups on file. Subjective   SUBJECTIVE/OBJECTIVE:  HPI  Patient presents today for ongoing problems urination, frequent urination and dysuria. Patient has also had right-sided flank pain/abdominal pain. Denies any fever or chills. Denies any vomiting or diarrhea. Denies any discharge. Review of Systems   HENT: Negative. Respiratory: Negative. Cardiovascular: Negative. Gastrointestinal: Positive for abdominal pain. Genitourinary: Positive for dysuria, flank pain, frequency and urgency. Musculoskeletal: Positive for back pain. Skin: Negative. Neurological: Negative. All other systems reviewed and are negative.          Current Outpatient Medications:     cefdinir (OMNICEF) 300 MG capsule, Take 1 capsule by mouth 2 times daily for 7 days, Disp: 14 capsule, Rfl: 0    atomoxetine (STRATTERA) 40 MG capsule, Take 1 capsule by mouth daily, Disp: 30 capsule, Rfl: 3    busPIRone (BUSPAR) 5 MG tablet, TAKE ONE TABLET BY MOUTH 3 TIMES A DAY, Disp: 90 tablet, Rfl: 0    fluticasone (FLONASE) 50 MCG/ACT nasal spray, , Disp: , Rfl:     loratadine (CLARITIN) 10 MG tablet, , Disp: , Rfl:     citalopram (CELEXA) 40 MG tablet, Take 1 tablet by mouth daily, Disp: 30 tablet, Rfl: 5    Elagolix Sodium (ORILISSA) 150 MG TABS, Take 150 mg by mouth daily, Disp: 60 tablet, Rfl: 2    sucralfate (CARAFATE) 1 GM tablet, Take 1 tablet by mouth 4 times daily, Disp: 120 tablet, Rfl: 2    ondansetron (ZOFRAN ODT) 4 MG disintegrating tablet, Take 1 tablet by mouth every 8 hours as needed for Nausea or Vomiting, Disp: 24 tablet, Rfl: 0    topiramate (TOPAMAX) 25 MG tablet, Start 1 twice daily x 2 wks, then 1 in a.m. and 2 in p.m. x 2 wks, then 2 twice daily if needed (Patient taking differently: Take 25 mg by mouth daily ), Disp: 124 tablet, Rfl: 5    eletriptan (RELPAX) 40 MG tablet, No more than one in 24 hrs., Disp: 6 tablet, Rfl: 3    vitamin D (ERGOCALCIFEROL) 1.25 MG (35589 UT) CAPS capsule, Take 1 capsule by mouth once a week, Disp: 12 capsule, Rfl: 0    vitamin D (ERGOCALCIFEROL) 1.25 MG (47870 UT) CAPS capsule, Take 1 capsule by mouth Twice a Week (Patient not taking: Reported on 1/6/2022), Disp: 24 capsule, Rfl: 1   Patient Active Problem List   Diagnosis    Recurrent acute suppurative otitis media of right ear without spontaneous rupture of tympanic membrane    Acute otitis externa of right ear    Migraine headache    PCOS (polycystic ovarian syndrome)    Vitamin D deficiency    Folic acid deficiency    Chronic tension-type headache, intractable    Diarrhea    Non-intractable vomiting    Dysmenorrhea    Right ovarian cyst    GERD with esophagitis    Morbid obesity due to excess calories (HCC)    Disruption or dehiscence of closure of muscle or muscle flap    Chronic mixed headache syndrome    Acute bacterial sinusitis    Major depressive disorder, single episode, unspecified    Anxiety    Arthritis    Breast lump    Ex-smoker    Fatigue    History of bariatric surgery    History of gastric bypass    Restless legs    S/P laparoscopic sleeve gastrectomy    Attention deficit hyperactivity disorder (ADHD), combined type     Past Medical History:   Diagnosis Date    Allergic rhinitis     history    Anesthesia complication     pulse ox dropped    Arthritis     Chronic mixed headache syndrome 2021    Chronic sinusitis     Chronic tension-type headache, intractable 2020    Constipation     Depression with anxiety     Endometriosis     Folic acid deficiency     Migraine headache 2020    Morbidly obese (HCC)     PCOS (polycystic ovarian syndrome) 2020    Prolonged emergence from general anesthesia     RLS (restless legs syndrome)     Vitamin D deficiency 2020     Past Surgical History:   Procedure Laterality Date    ACHILLES TENDON SURGERY Right 2020    FUSION FIRST TARSOMETATARSAL JOINT RIGHT TENDO ACHILLES TENDON LENGTHENING RIGHT performed by Chelsy Pang DPM at [address]  2006    CHOLECYSTECTOMY  2009    COLONOSCOPY      DILATION AND CURETTAGE OF UTERUS  2013    HYSTEROSCOPY  2015    [facility]   LAPAROSCOPY    SAJI-EN-Y GASTRIC BYPASS N/A 2020    GASTRIC SLEEVE CONVERT TO  SAJI-EN-Y LAPAROSCOPIC performed by Lieutenant Radha MD at [facility]  2017    [facility]    TUBAL LIGATION  2014    Abalation    UPPER GASTROINTESTINAL ENDOSCOPY N/A 2020    EGD BIOPSY performed by Lieutenant Radha MD at [facility] History     Socioeconomic History    Marital status:      Spouse name: Not on file    Number of children: Not on file    Years of education: Not on file    Highest education level: Not on file   Occupational History    Not on file   Tobacco Use    Smoking status: Former Smoker     Packs/day: 0.50     Years: 22.00     Pack years: 11.00     Start date: 46     Quit date: 2015     Years since quittin.0    Smokeless tobacco: Never Used    Tobacco comment: quit smoking 2015   Vaping Use    Vaping Use: Never used   Substance and Sexual Activity    Alcohol use: Not Currently    Drug use: No    Sexual activity: Not on file   Other Topics Concern    Not on file   Social History Narrative    Not on file     Social Determinants reminders to display for this patient. Health Maintenance   Topic Date Due    Varicella vaccine (1 of 2 - 2-dose childhood series) Never done    HIV screen  Never done    COVID-19 Vaccine (3 - Booster for Moderna series) 11/07/2021    Depression Monitoring  08/30/2022    Cervical cancer screen  02/21/2025    DTaP/Tdap/Td vaccine (2 - Td or Tdap) 03/16/2031    Flu vaccine  Completed    Hepatitis C screen  Completed    Hepatitis A vaccine  Aged Out    Hepatitis B vaccine  Aged Out    Hib vaccine  Aged Out    Meningococcal (ACWY) vaccine  Aged Out    Pneumococcal 0-64 years Vaccine  Aged Out      There are no preventive care reminders to display for this patient. There are no preventive care reminders to display for this patient. /76   Pulse 89   Temp 98.2 °F (36.8 °C) (Temporal)   Resp 18   Ht 5' 7\" (1.702 m)   Wt 228 lb (103.4 kg)   SpO2 97%   BMI 35.71 kg/m²     Objective   Physical Exam  Vitals reviewed. Constitutional:       Appearance: She is well-developed. HENT:      Head: Normocephalic and atraumatic. Eyes:      Conjunctiva/sclera: Conjunctivae normal.      Pupils: Pupils are equal, round, and reactive to light. Cardiovascular:      Rate and Rhythm: Normal rate and regular rhythm. Heart sounds: Normal heart sounds. Pulmonary:      Effort: Pulmonary effort is normal.      Breath sounds: Normal breath sounds. Abdominal:      General: Bowel sounds are normal.      Palpations: Abdomen is soft. Tenderness: There is right CVA tenderness. Skin:     General: Skin is warm and dry. Neurological:      Mental Status: She is alert and oriented to person, place, and time. Psychiatric:         Behavior: Behavior normal.         Judgment: Judgment normal.                  An electronic signature was used to authenticate this note.     --Jeanette Acosta DO

## 2022-01-15 LAB — URINE CULTURE, ROUTINE: NORMAL

## 2022-01-18 ENCOUNTER — NURSE ONLY (OUTPATIENT)
Dept: FAMILY MEDICINE CLINIC | Age: 37
End: 2022-01-18

## 2022-01-18 ENCOUNTER — TELEPHONE (OUTPATIENT)
Dept: FAMILY MEDICINE CLINIC | Age: 37
End: 2022-01-18

## 2022-01-18 DIAGNOSIS — R11.11 VOMITING WITHOUT NAUSEA, INTRACTABILITY OF VOMITING NOT SPECIFIED, UNSPECIFIED VOMITING TYPE: Primary | ICD-10-CM

## 2022-01-18 DIAGNOSIS — Z11.52 ENCOUNTER FOR SCREENING FOR COVID-19: Primary | ICD-10-CM

## 2022-01-18 DIAGNOSIS — R11.10 ACUTE VOMITING: Primary | ICD-10-CM

## 2022-01-18 LAB
Lab: NORMAL
QC PASS/FAIL: NORMAL
SARS-COV-2 RDRP RESP QL NAA+PROBE: NEGATIVE

## 2022-01-18 PROCEDURE — 99999 PR OFFICE/OUTPT VISIT,PROCEDURE ONLY: CPT | Performed by: FAMILY MEDICINE

## 2022-01-21 ENCOUNTER — PATIENT MESSAGE (OUTPATIENT)
Dept: PRIMARY CARE CLINIC | Age: 37
End: 2022-01-21

## 2022-01-21 DIAGNOSIS — B96.89 ACUTE BACTERIAL SINUSITIS: Primary | ICD-10-CM

## 2022-01-21 DIAGNOSIS — J01.90 ACUTE BACTERIAL SINUSITIS: Primary | ICD-10-CM

## 2022-01-21 RX ORDER — PSEUDOEPHEDRINE HCL 120 MG/1
120 TABLET, FILM COATED, EXTENDED RELEASE ORAL EVERY 12 HOURS
Qty: 30 TABLET | Refills: 0 | Status: SHIPPED | OUTPATIENT
Start: 2022-01-21 | End: 2022-02-05

## 2022-01-21 RX ORDER — METHYLPREDNISOLONE 4 MG/1
TABLET ORAL
Qty: 1 KIT | Refills: 0 | Status: SHIPPED
Start: 2022-01-21 | End: 2022-02-23

## 2022-01-21 RX ORDER — AZELASTINE 1 MG/ML
1 SPRAY, METERED NASAL 2 TIMES DAILY
Qty: 60 ML | Refills: 1 | Status: SHIPPED | OUTPATIENT
Start: 2022-01-21

## 2022-01-21 NOTE — TELEPHONE ENCOUNTER
From: James Gutierrez Randall Covert  To: Dr. Pratima Issa: 1/21/2022 7:04 AM EST  Subject: Sinus pressure    Sorry for pestering you seems like I have had a lot going on lately. I just finished the antibiotic for my UTI and that is fine but I am having some sinus issues I had already done the PCR test for COVID and that was negative so at least we know it's not that. I'm having bilateral ear pain even to touch my ears it is very painful a lot of pressure around my eyes constant running nose no color to it. The ear pain and the pressure is what's bothering me the most I didn't know if there was anything maybe a steroid or something I didn't know if you would want me on another antibiotics since I just finished one or if there was something over the counter I did try to do an over-the-counter antihistamine and it has not helped at all. I've also been using Flonase and it hasn't relieved any thing. Just let me know or if you need to see me let me know and I can work around my schedule around it at work. Again sorry I've had to ask you for so much lately.  Have a great day    Lia Loo

## 2022-01-25 ENCOUNTER — NURSE ONLY (OUTPATIENT)
Dept: FAMILY MEDICINE CLINIC | Age: 37
End: 2022-01-25
Payer: COMMERCIAL

## 2022-01-25 DIAGNOSIS — Z23 NEED FOR TUBERCULOSIS VACCINATION: Primary | ICD-10-CM

## 2022-01-25 PROCEDURE — 86580 TB INTRADERMAL TEST: CPT | Performed by: FAMILY MEDICINE

## 2022-01-28 ENCOUNTER — NURSE ONLY (OUTPATIENT)
Dept: FAMILY MEDICINE CLINIC | Age: 37
End: 2022-01-28

## 2022-01-28 DIAGNOSIS — Z23 NEED FOR TUBERCULOSIS VACCINATION: Primary | ICD-10-CM

## 2022-01-28 LAB
INDURATION: NORMAL
TB SKIN TEST: NORMAL

## 2022-02-01 ENCOUNTER — NURSE ONLY (OUTPATIENT)
Dept: FAMILY MEDICINE CLINIC | Age: 37
End: 2022-02-01
Payer: COMMERCIAL

## 2022-02-01 DIAGNOSIS — Z11.1 SCREENING FOR TUBERCULOSIS: Primary | ICD-10-CM

## 2022-02-01 PROCEDURE — 99999 PR OFFICE/OUTPT VISIT,PROCEDURE ONLY: CPT | Performed by: FAMILY MEDICINE

## 2022-02-01 PROCEDURE — 86580 TB INTRADERMAL TEST: CPT | Performed by: FAMILY MEDICINE

## 2022-02-01 SDOH — ECONOMIC STABILITY: FOOD INSECURITY: WITHIN THE PAST 12 MONTHS, YOU WORRIED THAT YOUR FOOD WOULD RUN OUT BEFORE YOU GOT MONEY TO BUY MORE.: NEVER TRUE

## 2022-02-01 SDOH — ECONOMIC STABILITY: FOOD INSECURITY: WITHIN THE PAST 12 MONTHS, THE FOOD YOU BOUGHT JUST DIDN'T LAST AND YOU DIDN'T HAVE MONEY TO GET MORE.: NEVER TRUE

## 2022-02-01 ASSESSMENT — SOCIAL DETERMINANTS OF HEALTH (SDOH): HOW HARD IS IT FOR YOU TO PAY FOR THE VERY BASICS LIKE FOOD, HOUSING, MEDICAL CARE, AND HEATING?: NOT HARD AT ALL

## 2022-02-04 ENCOUNTER — NURSE ONLY (OUTPATIENT)
Dept: FAMILY MEDICINE CLINIC | Age: 37
End: 2022-02-04

## 2022-02-04 LAB
INDURATION: NORMAL
TB SKIN TEST: NORMAL

## 2022-02-04 PROCEDURE — 99999 PR OFFICE/OUTPT VISIT,PROCEDURE ONLY: CPT | Performed by: FAMILY MEDICINE

## 2022-02-14 ENCOUNTER — TELEPHONE (OUTPATIENT)
Dept: BARIATRICS/WEIGHT MGMT | Age: 37
End: 2022-02-14

## 2022-02-14 DIAGNOSIS — K91.2 MALNUTRITION FOLLOWING GASTROINTESTINAL SURGERY: Primary | ICD-10-CM

## 2022-02-17 ENCOUNTER — OFFICE VISIT (OUTPATIENT)
Dept: CHIROPRACTIC MEDICINE | Age: 37
End: 2022-02-17
Payer: COMMERCIAL

## 2022-02-17 VITALS
TEMPERATURE: 97.3 F | OXYGEN SATURATION: 97 % | HEART RATE: 76 BPM | HEIGHT: 67 IN | WEIGHT: 228 LBS | BODY MASS INDEX: 35.79 KG/M2

## 2022-02-17 DIAGNOSIS — M54.04 PANNICULITIS AFFECTING REGIONS OF NECK AND BACK, THORACIC REGION: ICD-10-CM

## 2022-02-17 DIAGNOSIS — M54.42 LEFT-SIDED LOW BACK PAIN WITH LEFT-SIDED SCIATICA, UNSPECIFIED CHRONICITY: ICD-10-CM

## 2022-02-17 DIAGNOSIS — M54.2 CERVICALGIA: Primary | ICD-10-CM

## 2022-02-17 PROCEDURE — 98941 CHIROPRACT MANJ 3-4 REGIONS: CPT | Performed by: CHIROPRACTOR

## 2022-02-17 PROCEDURE — 99999 PR OFFICE/OUTPT VISIT,PROCEDURE ONLY: CPT | Performed by: CHIROPRACTOR

## 2022-02-17 NOTE — PROGRESS NOTES
vitamin D (ERGOCALCIFEROL) 1.25 MG (99449 UT) CAPS capsule, Take 1 capsule by mouth Twice a Week, Disp: 24 capsule, Rfl: 1    sucralfate (CARAFATE) 1 GM tablet, Take 1 tablet by mouth 4 times daily, Disp: 120 tablet, Rfl: 2    Exam:   Vitals:    02/17/22 1111   Pulse: 76   Temp: 97.3 °F (36.3 °C)   SpO2: 97%     Point tenderness left SI joint. Mild degree of midline pain at L5-S1. Multiple trigger points bilateral suboccipital group, bilateral trapezius and left levator scapulae. There are hypertonic and tender fibers noted today in the cervical, thoracic and lumbar paraspinal muscles. Joint fixation is noted with motion screening at L4-5, bilateral SI joints, T3-6, C4-6. Yonatan Gunter was seen today for neck pain and lower back pain. Diagnoses and all orders for this visit:    Cervicalgia    Panniculitis affecting regions of neck and back, thoracic region    Left-sided low back pain with left-sided sciatica, unspecified chronicity        Treatment Plan: Continued with Love flexion distraction manipulation at L4, protocol 1. Mechanically assisted manipulation of the SI joints. Diversified manipulation of the listed thoracic, cervical segments. Tolerated well. She noted relief following care. I will see her back as needed for further treatment.       Seen By:  Mary Coello DC

## 2022-02-22 ENCOUNTER — NURSE ONLY (OUTPATIENT)
Dept: FAMILY MEDICINE CLINIC | Age: 37
End: 2022-02-22

## 2022-02-22 DIAGNOSIS — U07.1 COVID: Primary | ICD-10-CM

## 2022-02-22 LAB
Lab: NORMAL
QC PASS/FAIL: NORMAL
SARS-COV-2 RDRP RESP QL NAA+PROBE: NEGATIVE

## 2022-02-22 PROCEDURE — 99999 PR OFFICE/OUTPT VISIT,PROCEDURE ONLY: CPT | Performed by: FAMILY MEDICINE

## 2022-02-22 PROCEDURE — 87635 SARS-COV-2 COVID-19 AMP PRB: CPT | Performed by: FAMILY MEDICINE

## 2022-02-25 ENCOUNTER — TELEPHONE (OUTPATIENT)
Dept: BARIATRICS/WEIGHT MGMT | Age: 37
End: 2022-02-25

## 2022-02-25 NOTE — TELEPHONE ENCOUNTER
Pt missed her appt with Dr Marei Awan today. I called and LM for her to call back and we would be happy to reschedule.

## 2022-03-02 ENCOUNTER — PATIENT MESSAGE (OUTPATIENT)
Dept: PRIMARY CARE CLINIC | Age: 37
End: 2022-03-02

## 2022-03-02 DIAGNOSIS — F43.22 ADJUSTMENT DISORDER WITH ANXIETY: Primary | ICD-10-CM

## 2022-03-03 RX ORDER — DIAZEPAM 5 MG/1
5 TABLET ORAL EVERY 8 HOURS PRN
Qty: 90 TABLET | Refills: 3 | Status: SHIPPED | OUTPATIENT
Start: 2022-03-03 | End: 2022-07-01

## 2022-03-03 NOTE — TELEPHONE ENCOUNTER
From: Yael Webster Randall Covert  To: Dr. Christiane Villa: 3/2/2022 9:54 PM EST  Subject: Anxiety    Hello  I know I filled you in already on everything going on with me at home and I have been taking my PRN buspar but it's been getting worse and it's not even touching my anxiety. I was wondering if you could recommend anything or call something into [facility]? If you need me to I can come in for a visit. I don't know if I need to increase something or maybe a short term prn added. Just let me know what I need to do please. Thank you.    Pedro Winslow

## 2022-03-21 ENCOUNTER — HOSPITAL ENCOUNTER (OUTPATIENT)
Dept: CT IMAGING | Age: 37
Discharge: HOME | End: 2022-03-23
Payer: COMMERCIAL

## 2022-03-21 ENCOUNTER — OFFICE VISIT (OUTPATIENT)
Dept: FAMILY MEDICINE CLINIC | Age: 37
End: 2022-03-21
Payer: COMMERCIAL

## 2022-03-21 VITALS
HEART RATE: 74 BPM | HEIGHT: 67 IN | DIASTOLIC BLOOD PRESSURE: 74 MMHG | SYSTOLIC BLOOD PRESSURE: 114 MMHG | BODY MASS INDEX: 35.16 KG/M2 | TEMPERATURE: 97.6 F | OXYGEN SATURATION: 100 % | WEIGHT: 224 LBS | RESPIRATION RATE: 18 BRPM

## 2022-03-21 DIAGNOSIS — R10.31 RLQ ABDOMINAL PAIN: ICD-10-CM

## 2022-03-21 DIAGNOSIS — R10.9 RIGHT FLANK DISCOMFORT: ICD-10-CM

## 2022-03-21 DIAGNOSIS — K91.2 MALNUTRITION FOLLOWING GASTROINTESTINAL SURGERY: ICD-10-CM

## 2022-03-21 DIAGNOSIS — M54.50 RIGHT-SIDED LOW BACK PAIN WITHOUT SCIATICA, UNSPECIFIED CHRONICITY: ICD-10-CM

## 2022-03-21 DIAGNOSIS — R82.2 BILIRUBINURIA: ICD-10-CM

## 2022-03-21 DIAGNOSIS — M54.50 RIGHT-SIDED LOW BACK PAIN WITHOUT SCIATICA, UNSPECIFIED CHRONICITY: Primary | ICD-10-CM

## 2022-03-21 LAB
ALBUMIN SERPL-MCNC: 3.8 G/DL (ref 3.5–5.2)
ALBUMIN SERPL-MCNC: 3.8 G/DL (ref 3.5–5.2)
ALP BLD-CCNC: 85 U/L (ref 35–104)
ALP BLD-CCNC: 85 U/L (ref 35–104)
ALT SERPL-CCNC: 18 U/L (ref 0–32)
ALT SERPL-CCNC: 19 U/L (ref 0–32)
ANION GAP SERPL CALCULATED.3IONS-SCNC: 13 MMOL/L (ref 7–16)
ANION GAP SERPL CALCULATED.3IONS-SCNC: 14 MMOL/L (ref 7–16)
AST SERPL-CCNC: 23 U/L (ref 0–31)
AST SERPL-CCNC: 25 U/L (ref 0–31)
BASOPHILS ABSOLUTE: 0.02 E9/L (ref 0–0.2)
BASOPHILS RELATIVE PERCENT: 0.3 % (ref 0–2)
BILIRUB SERPL-MCNC: 0.5 MG/DL (ref 0–1.2)
BILIRUB SERPL-MCNC: 0.5 MG/DL (ref 0–1.2)
BILIRUBIN, POC: NORMAL
BLOOD URINE, POC: NEGATIVE
BUN BLDV-MCNC: 11 MG/DL (ref 6–20)
BUN BLDV-MCNC: 11 MG/DL (ref 6–20)
CALCIUM SERPL-MCNC: 8.6 MG/DL (ref 8.6–10.2)
CALCIUM SERPL-MCNC: 8.8 MG/DL (ref 8.6–10.2)
CHLORIDE BLD-SCNC: 104 MMOL/L (ref 98–107)
CHLORIDE BLD-SCNC: 105 MMOL/L (ref 98–107)
CHOLESTEROL, TOTAL: 125 MG/DL (ref 0–199)
CLARITY, POC: NORMAL
CO2: 24 MMOL/L (ref 22–29)
CO2: 24 MMOL/L (ref 22–29)
COLOR, POC: NORMAL
CREAT SERPL-MCNC: 0.8 MG/DL (ref 0.5–1)
CREAT SERPL-MCNC: 0.8 MG/DL (ref 0.5–1)
EOSINOPHILS ABSOLUTE: 0.06 E9/L (ref 0.05–0.5)
EOSINOPHILS RELATIVE PERCENT: 1 % (ref 0–6)
FERRITIN: 19 NG/ML
FOLATE: 6.3 NG/ML (ref 4.8–24.2)
GFR AFRICAN AMERICAN: >60
GFR AFRICAN AMERICAN: >60
GFR NON-AFRICAN AMERICAN: >60 ML/MIN/1.73
GFR NON-AFRICAN AMERICAN: >60 ML/MIN/1.73
GLUCOSE BLD-MCNC: 91 MG/DL (ref 74–99)
GLUCOSE BLD-MCNC: 92 MG/DL (ref 74–99)
GLUCOSE URINE, POC: NEGATIVE
HCT VFR BLD CALC: 40 % (ref 34–48)
HEMOGLOBIN: 12.6 G/DL (ref 11.5–15.5)
IMMATURE GRANULOCYTES #: 0.01 E9/L
IMMATURE GRANULOCYTES %: 0.2 % (ref 0–5)
KETONES, POC: NEGATIVE
LEUKOCYTE EST, POC: NEGATIVE
LYMPHOCYTES ABSOLUTE: 1.53 E9/L (ref 1.5–4)
LYMPHOCYTES RELATIVE PERCENT: 24.4 % (ref 20–42)
MCH RBC QN AUTO: 28.1 PG (ref 26–35)
MCHC RBC AUTO-ENTMCNC: 31.5 % (ref 32–34.5)
MCV RBC AUTO: 89.1 FL (ref 80–99.9)
MONOCYTES ABSOLUTE: 0.36 E9/L (ref 0.1–0.95)
MONOCYTES RELATIVE PERCENT: 5.7 % (ref 2–12)
NEUTROPHILS ABSOLUTE: 4.29 E9/L (ref 1.8–7.3)
NEUTROPHILS RELATIVE PERCENT: 68.4 % (ref 43–80)
NITRITE, POC: NEGATIVE
PDW BLD-RTO: 14.3 FL (ref 11.5–15)
PH, POC: 6.5
PLATELET # BLD: 277 E9/L (ref 130–450)
PMV BLD AUTO: 10.2 FL (ref 7–12)
POTASSIUM SERPL-SCNC: 3.2 MMOL/L (ref 3.5–5)
POTASSIUM SERPL-SCNC: 3.2 MMOL/L (ref 3.5–5)
PREALBUMIN: 16 MG/DL (ref 20–40)
PROTEIN, POC: 30
RBC # BLD: 4.49 E12/L (ref 3.5–5.5)
SODIUM BLD-SCNC: 142 MMOL/L (ref 132–146)
SODIUM BLD-SCNC: 142 MMOL/L (ref 132–146)
SPECIFIC GRAVITY, POC: 1.03
TOTAL PROTEIN: 6.7 G/DL (ref 6.4–8.3)
TOTAL PROTEIN: 6.7 G/DL (ref 6.4–8.3)
TRIGL SERPL-MCNC: 60 MG/DL (ref 0–149)
UROBILINOGEN, POC: 1
VITAMIN B-12: 398 PG/ML (ref 211–946)
WBC # BLD: 6.3 E9/L (ref 4.5–11.5)

## 2022-03-21 PROCEDURE — 96372 THER/PROPH/DIAG INJ SC/IM: CPT | Performed by: STUDENT IN AN ORGANIZED HEALTH CARE EDUCATION/TRAINING PROGRAM

## 2022-03-21 PROCEDURE — 99214 OFFICE O/P EST MOD 30 MIN: CPT | Performed by: STUDENT IN AN ORGANIZED HEALTH CARE EDUCATION/TRAINING PROGRAM

## 2022-03-21 PROCEDURE — 81002 URINALYSIS NONAUTO W/O SCOPE: CPT | Performed by: STUDENT IN AN ORGANIZED HEALTH CARE EDUCATION/TRAINING PROGRAM

## 2022-03-21 PROCEDURE — 74176 CT ABD & PELVIS W/O CONTRAST: CPT

## 2022-03-21 RX ORDER — KETOROLAC TROMETHAMINE 30 MG/ML
30 INJECTION, SOLUTION INTRAMUSCULAR; INTRAVENOUS ONCE
Status: COMPLETED | OUTPATIENT
Start: 2022-03-21 | End: 2022-03-21

## 2022-03-21 RX ORDER — CYCLOBENZAPRINE HCL 5 MG
5 TABLET ORAL 2 TIMES DAILY PRN
Qty: 10 TABLET | Refills: 0 | Status: SHIPPED | OUTPATIENT
Start: 2022-03-21 | End: 2022-03-31

## 2022-03-21 RX ORDER — PREDNISONE 20 MG/1
20 TABLET ORAL 2 TIMES DAILY
Qty: 10 TABLET | Refills: 0 | Status: SHIPPED | OUTPATIENT
Start: 2022-03-21 | End: 2022-03-26

## 2022-03-21 RX ADMIN — KETOROLAC TROMETHAMINE 30 MG: 30 INJECTION, SOLUTION INTRAMUSCULAR; INTRAVENOUS at 12:20

## 2022-03-21 ASSESSMENT — ENCOUNTER SYMPTOMS
NAUSEA: 0
BACK PAIN: 1
ABDOMINAL PAIN: 0
VOMITING: 0
COUGH: 0
SHORTNESS OF BREATH: 0
RHINORRHEA: 0

## 2022-03-21 NOTE — RESULT ENCOUNTER NOTE
I called and spoke to the patient regarding her results. Overall unremarkable CT scan. Some kidney stones on the L that are not causing her issues.  Will treat as muscular and await the results of her blood work    Please call and schedule her with follow up with her PCP in the next few weeks

## 2022-03-21 NOTE — PROGRESS NOTES
Brooklynn Pereyra Covert (:  1985) is a 39 y.o. female,Established patient, here for evaluation of the following chief complaint(s):  Lower Back Pain (patient states that she has been having extreme discomfort that started this AM ) and Abdominal Pain       ASSESSMENT/PLAN:  1. Right-sided low back pain without sciatica, unspecified chronicity  -     POCT Urinalysis no Micro  -     Culture, Urine; Future  -     XR ABDOMEN (KUB) (SINGLE AP VIEW); Future  -     ketorolac (TORADOL) injection 30 mg; 30 mg, IntraMUSCular, ONCE, On Mon 3/21/22 at 1230, For 1 doseDo not administer for more than 5 days. -     CT ABDOMEN PELVIS WO CONTRAST Additional Contrast? None; Future  2. Bilirubinuria  -     CT ABDOMEN PELVIS WO CONTRAST Additional Contrast? None; Future  3. Right flank discomfort  -     Comprehensive Metabolic Panel; Future  -     CBC with Auto Differential; Future  -     CT ABDOMEN PELVIS WO CONTRAST Additional Contrast? None; Future  4. RLQ abdominal pain  -     CT ABDOMEN PELVIS WO CONTRAST Additional Contrast? None; Future  I am concerned for multiple conditions including hydronephrosis secondary to nephrolithiasis, appendicitis, ovarian cyst, diverticulitis, and pyelonephritis. This is because of the RLQ tenderness and guarding, severe R sided flank pain with radiation. She also has bilirubinuria and I feel and CT may help evaluate her liver. I think Nephrolithiasis is most consistent with her symptoms however at this time so I will order the CT without contrast. Will order stat-keep and call. Advised to increase fluids in the mean time. Will treat accordingly based on CT results including possible advise to go to ER, antibiotics, flomax or other    Return if symptoms worsen or fail to improve.        Subjective   SUBJECTIVE/OBJECTIVE:  Back pain  -started this morning  -No known injury  -no history of kidney stones  -all right sided  -she has not noticed any blood in the urine, but not any more dark than usual  -Pain is radiating around to the RLQ  -Also radiating to R groin and thigh  -There is no pain with urination but there is more pressure/discomort than usual  -she has taken some tylenol  -no fever that she is aware of-did have her temperature taken at work  -no RUQ pain, has some other vaugue abdominal pain      Review of Systems   Constitutional: Negative for chills and fever. HENT: Negative for congestion and rhinorrhea. Respiratory: Negative for cough and shortness of breath. Cardiovascular: Negative for chest pain and leg swelling. Gastrointestinal: Negative for abdominal pain, nausea and vomiting. Genitourinary: Positive for dysuria and flank pain. Negative for hematuria. Musculoskeletal: Positive for back pain. Negative for arthralgias and myalgias. Skin: Negative for rash and wound. Neurological: Negative for dizziness and light-headedness. Objective   Physical Exam  Vitals reviewed. Constitutional:       General: She is in acute distress. Appearance: She is ill-appearing. HENT:      Head: Normocephalic and atraumatic. Eyes:      Extraocular Movements: Extraocular movements intact. Conjunctiva/sclera: Conjunctivae normal.   Cardiovascular:      Rate and Rhythm: Normal rate and regular rhythm. Pulmonary:      Effort: Pulmonary effort is normal.      Breath sounds: Normal breath sounds. No wheezing. Abdominal:      General: Abdomen is flat. There is no distension. Tenderness: There is right CVA tenderness and guarding. There is no left CVA tenderness. Comments: There is RLQ tenderness and guarding. There is no RUQ pain   Musculoskeletal:         General: No tenderness or deformity. Neurological:      General: No focal deficit present. Mental Status: She is alert and oriented to person, place, and time. An electronic signature was used to authenticate this note.     --Courtney Collins MD

## 2022-03-22 NOTE — RESULT ENCOUNTER NOTE
Please let Brooklynn Marti know her labs are normal except a slightly low potassium. It looks like Dr. Concepcion Ventura ordered some labs too. I would like to see if and how Dr. Concepcion Ventura would like to replace. Let us know if she doesn't hear from him.   If still having pain I would recommend following up with her PCP

## 2022-03-23 LAB — URINE CULTURE, ROUTINE: NORMAL

## 2022-03-23 NOTE — RESULT ENCOUNTER NOTE
Urine culture not showing obvious infection. Is somewhat inconclusive.  If worsening before her appt with PCP should be seen via [facility] again

## 2022-03-25 LAB
COPPER: 104.6 UG/DL (ref 80–155)
SELENIUM: 132.3 UG/L (ref 23–190)
ZINC: 56.2 UG/DL (ref 60–120)

## 2022-03-27 LAB — VITAMIN B1 WHOLE BLOOD: 105 NMOL/L (ref 70–180)

## 2022-03-29 ENCOUNTER — OFFICE VISIT (OUTPATIENT)
Dept: FAMILY MEDICINE CLINIC | Age: 37
End: 2022-03-29
Payer: COMMERCIAL

## 2022-03-29 VITALS
BODY MASS INDEX: 36.73 KG/M2 | TEMPERATURE: 98.1 F | WEIGHT: 234 LBS | OXYGEN SATURATION: 98 % | HEART RATE: 76 BPM | SYSTOLIC BLOOD PRESSURE: 124 MMHG | DIASTOLIC BLOOD PRESSURE: 82 MMHG | HEIGHT: 67 IN

## 2022-03-29 DIAGNOSIS — L30.9 DERMATITIS, UNSPECIFIED: ICD-10-CM

## 2022-03-29 DIAGNOSIS — M54.50 RIGHT-SIDED LOW BACK PAIN WITHOUT SCIATICA, UNSPECIFIED CHRONICITY: Primary | ICD-10-CM

## 2022-03-29 PROCEDURE — 99212 OFFICE O/P EST SF 10 MIN: CPT | Performed by: FAMILY MEDICINE

## 2022-03-29 PROCEDURE — 96372 THER/PROPH/DIAG INJ SC/IM: CPT | Performed by: FAMILY MEDICINE

## 2022-03-29 RX ORDER — KETOROLAC TROMETHAMINE 10 MG/1
10 TABLET, FILM COATED ORAL
Qty: 20 TABLET | Refills: 0 | Status: SHIPPED | OUTPATIENT
Start: 2022-03-29 | End: 2023-03-29

## 2022-03-29 RX ORDER — CLOBETASOL PROPIONATE 0.5 MG/G
OINTMENT TOPICAL
Qty: 45 G | Refills: 1 | Status: SHIPPED | OUTPATIENT
Start: 2022-03-29

## 2022-03-29 RX ORDER — KETOROLAC TROMETHAMINE 30 MG/ML
30 INJECTION, SOLUTION INTRAMUSCULAR; INTRAVENOUS ONCE
Status: COMPLETED | OUTPATIENT
Start: 2022-03-29 | End: 2022-03-29

## 2022-03-29 RX ADMIN — KETOROLAC TROMETHAMINE 30 MG: 30 INJECTION, SOLUTION INTRAMUSCULAR; INTRAVENOUS at 16:50

## 2022-03-29 ASSESSMENT — ENCOUNTER SYMPTOMS
COUGH: 0
VOMITING: 0
RHINORRHEA: 0
BACK PAIN: 1
SHORTNESS OF BREATH: 0
ABDOMINAL PAIN: 0
NAUSEA: 0

## 2022-03-29 NOTE — PROGRESS NOTES
Melanie Wall (:  1985) is a 39 y.o. female,Established patient, here for evaluation of the following chief complaint(s):  Follow-up (express care f/u)         ASSESSMENT/PLAN:  1. Right-sided low back pain without sciatica, unspecified chronicity  -     ketorolac (TORADOL) 10 MG tablet; Take 1 tablet by mouth every 8-12 hours as needed for Pain With food, Disp-20 tablet, R-0Normal  -     ketorolac (TORADOL) injection 30 mg; 30 mg, IntraMUSCular, ONCE, 1 dose, On Tue 3/29/22 at 1715Do not administer for more than 5 days  2. Dermatitis, unspecified  -     clobetasol (TEMOVATE) 0.05 % ointment; Apply topically 2 times daily. , Disp-45 g, R-1, Normal  CT scan showed no acute abnormalities in the area of concern. We will treat symptomatically at this time. Patient also has several areas of questionable psoriasis. We will treat symptomatically at this time as well and refer to dermatology if needed. No follow-ups on file. Subjective   SUBJECTIVE/OBJECTIVE:  HPI  Patient presents today for urgent care follow-up. Patient states that she is having increase in right-sided back pain after recently moving. Urinalysis was slightly abnormal.  She was sent for CAT scan to rule out kidney stone which showed slight nonobstructing kidney stone in the left but none in the area of concern. No other acute abnormalities noted on imaging. No real with previous medications. Review of Systems   Constitutional: Negative for chills and fever. HENT: Negative for congestion and rhinorrhea. Respiratory: Negative for cough and shortness of breath. Cardiovascular: Negative for chest pain and leg swelling. Gastrointestinal: Negative for abdominal pain, nausea and vomiting. Genitourinary: Positive for dysuria and flank pain. Negative for hematuria. Musculoskeletal: Positive for back pain. Negative for arthralgias and myalgias. Skin: Negative for rash and wound.    Neurological: Negative for dizziness and light-headedness. All other systems reviewed and are negative. Current Outpatient Medications:     traMADol (ULTRAM) 50 MG tablet, Take 1 tablet by mouth every 6 hours as needed for Pain for up to 7 days. Intended supply: 7 days. Take lowest dose possible to manage pain, Disp: 28 tablet, Rfl: 0    cyclobenzaprine (FLEXERIL) 5 MG tablet, Take 1 tablet by mouth 2 times daily as needed for Muscle spasms, Disp: 10 tablet, Rfl: 0    Elagolix Sodium (ORILISSA) 150 MG TABS, Take 150 mg by mouth daily, Disp: 60 tablet, Rfl: 3    tiZANidine (ZANAFLEX) 4 MG tablet, , Disp: , Rfl:     azelastine (ASTELIN) 0.1 % nasal spray, 1 spray by Nasal route 2 times daily Use in each nostril as directed, Disp: 60 mL, Rfl: 1    atomoxetine (STRATTERA) 40 MG capsule, Take 1 capsule by mouth daily, Disp: 30 capsule, Rfl: 3    busPIRone (BUSPAR) 5 MG tablet, TAKE ONE TABLET BY MOUTH 3 TIMES A DAY, Disp: 90 tablet, Rfl: 0    fluticasone (FLONASE) 50 MCG/ACT nasal spray, , Disp: , Rfl:     loratadine (CLARITIN) 10 MG tablet, , Disp: , Rfl:     citalopram (CELEXA) 40 MG tablet, Take 1 tablet by mouth daily, Disp: 30 tablet, Rfl: 5    topiramate (TOPAMAX) 25 MG tablet, Start 1 twice daily x 2 wks, then 1 in a.m. and 2 in p.m. x 2 wks, then 2 twice daily if needed (Patient taking differently: Take 25 mg by mouth daily ), Disp: 124 tablet, Rfl: 5    vitamin D (ERGOCALCIFEROL) 1.25 MG (06194 UT) CAPS capsule, Take 1 capsule by mouth once a week, Disp: 12 capsule, Rfl: 0    ketorolac (TORADOL) 10 MG tablet, Take 1 tablet by mouth every 8-12 hours as needed for Pain With food, Disp: 20 tablet, Rfl: 0    clobetasol (TEMOVATE) 0.05 % ointment, Apply topically 2 times daily. , Disp: 45 g, Rfl: 1    diazePAM (VALIUM) 5 MG tablet, Take 1 tablet by mouth every 8 hours as needed for Anxiety or Sleep for up to 120 days.  (Patient not taking: Reported on 3/21/2022), Disp: 90 tablet, Rfl: 3   Patient Active Problem List Diagnosis    Recurrent acute suppurative otitis media of right ear without spontaneous rupture of tympanic membrane    Acute otitis externa of right ear    Migraine headache    PCOS (polycystic ovarian syndrome)    Vitamin D deficiency    Folic acid deficiency    Chronic tension-type headache, intractable    Diarrhea    Non-intractable vomiting    Dysmenorrhea    Right ovarian cyst    GERD with esophagitis    Morbid obesity due to excess calories (HCC)    Disruption or dehiscence of closure of muscle or muscle flap    Chronic mixed headache syndrome    Acute bacterial sinusitis    Major depressive disorder, single episode, unspecified    Anxiety    Arthritis    Breast lump    Ex-smoker    Fatigue    History of bariatric surgery    History of gastric bypass    Restless legs    S/P laparoscopic sleeve gastrectomy    Attention deficit hyperactivity disorder (ADHD), combined type    Right-sided low back pain without sciatica    Dermatitis, unspecified     Past Medical History:   Diagnosis Date    Allergic rhinitis     history    Anesthesia complication     pulse ox dropped    Arthritis     Chronic mixed headache syndrome 2021    Chronic sinusitis     Chronic tension-type headache, intractable 2020    Constipation     Depression with anxiety     Endometriosis     Folic acid deficiency     Migraine headache 2020    Morbidly obese ([address]     PCOS (polycystic ovarian syndrome) 2020    Prolonged emergence from general anesthesia     RLS (restless legs syndrome)     Vitamin D deficiency 2020     Past Surgical History:   Procedure Laterality Date    ACHILLES TENDON SURGERY Right 2020    FUSION FIRST TARSOMETATARSAL JOINT RIGHT TENDO ACHILLES TENDON LENGTHENING RIGHT performed by Stefani Pitts DPM at [address]       SECTION  2006    CHOLECYSTECTOMY  2009    COLONOSCOPY      DILATION AND CURETTAGE OF UTERUS 2013    HYSTEROSCOPY  2015    [facility]   LAPAROSCOPY    SAJI-EN-Y GASTRIC BYPASS N/A 2020    GASTRIC SLEEVE CONVERT TO  SAJI-EN-Y LAPAROSCOPIC performed by Altaf Johnston MD at [address] [facility]  [facility]    [facility]    TUBAL LIGATION  2014    Abalation    UPPER GASTROINTESTINAL ENDOSCOPY N/A 2020    EGD BIOPSY performed by Altaf Johnston MD at [address] History     Socioeconomic History    Marital status: Legally      Spouse name: Not on file    Number of children: Not on file    Years of education: Not on file    Highest education level: Not on file   Occupational History    Not on file   Tobacco Use    Smoking status: Former Smoker     Packs/day: 0.50     Years: 22.00     Pack years: 11.00     Start date: 46     Quit date: 2015     Years since quittin.2    Smokeless tobacco: Never Used    Tobacco comment: quit smoking 2015   Vaping Use    Vaping Use: Never used   Substance and Sexual Activity    Alcohol use: Not Currently    Drug use: No    Sexual activity: Not on file   Other Topics Concern    Not on file   Social History Narrative    Not on file     Social Determinants of Health     Financial Resource Strain: Low Risk     Difficulty of Paying Living Expenses: Not hard at all   Food Insecurity: No Food Insecurity    Worried About [address] in the Last Year: Never true    [address] in the Last Year: Never true   Transportation Needs:     Lack of Transportation (Medical): Not on file    Lack of Transportation (Non-Medical):  Not on file   Physical Activity:     Days of Exercise per Week: Not on file    Minutes of Exercise per Session: Not on file   Stress:     Feeling of Stress : Not on file   Social Connections:     Frequency of Communication with Friends and Family: Not on file    Frequency of Social Gatherings with Friends and Family: Not on file    Attends Taoism Services: Not on file    Active Member of Clubs or Organizations: Not on file    Attends Club or Organization Meetings: Not on file    Marital Status: Not on file   Intimate Partner Violence:     Fear of Current or Ex-Partner: Not on file    Emotionally Abused: Not on file    Physically Abused: Not on file    Sexually Abused: Not on file   Housing Stability:     Unable to Pay for Housing in the Last Year: Not on file    Number of [place] in the Last Year: Not on file    Unstable Housing in the Last Year: Not on file     Family History   Problem Relation Age of Onset    High Blood Pressure Mother     High Cholesterol Mother     Arthritis Father     No Known Problems Sister     Cancer Maternal Grandmother     COPD Maternal Grandmother     Diabetes Maternal Grandmother     Elevated Lipids Maternal Grandmother     Hypertension Maternal Grandmother     Heart Disease Maternal Grandfather     Cancer Maternal Grandfather     Diabetes Maternal Grandfather       There are no preventive care reminders to display for this patient. There are no preventive care reminders to display for this patient. There are no preventive care reminders to display for this patient. There are no preventive care reminders to display for this patient. Health Maintenance   Topic Date Due    Varicella vaccine (1 of 2 - 2-dose childhood series) Never done    HIV screen  Never done    COVID-19 Vaccine (3 - Booster for Moderna series) 10/07/2021    Depression Monitoring  08/30/2022    Cervical cancer screen  02/21/2025    DTaP/Tdap/Td vaccine (2 - Td or Tdap) 03/16/2031    Flu vaccine  Completed    Hepatitis C screen  Completed    Hepatitis A vaccine  Aged Out    Hepatitis B vaccine  Aged Out    Hib vaccine  Aged Out    Meningococcal (ACWY) vaccine  Aged Out    Pneumococcal 0-64 years Vaccine  Aged Out      There are no preventive care reminders to display for this patient. There are no preventive care reminders to display for this patient. /82   Pulse 76   Temp 98.1 °F (36.7 °C)   Ht 5' 7\" (1.702 m)   Wt 234 lb (106.1 kg)   SpO2 98%   BMI 36.65 kg/m²     Objective   Physical Exam  Vitals reviewed. Constitutional:       Appearance: She is not ill-appearing. HENT:      Head: Normocephalic and atraumatic. Eyes:      Extraocular Movements: Extraocular movements intact. Conjunctiva/sclera: Conjunctivae normal.   Cardiovascular:      Rate and Rhythm: Normal rate and regular rhythm. Pulmonary:      Effort: Pulmonary effort is normal.      Breath sounds: Normal breath sounds. No wheezing. Abdominal:      General: Abdomen is flat. There is no distension. Tenderness: There is right CVA tenderness. There is no left CVA tenderness or guarding. Comments: There is RLQ tenderness and guarding. There is no RUQ pain   Musculoskeletal:         General: Tenderness present. No deformity. Neurological:      General: No focal deficit present. Mental Status: She is alert and oriented to person, place, and time. An electronic signature was used to authenticate this note.     --Red Jeff Acosta, DO

## 2022-04-15 ENCOUNTER — OFFICE VISIT (OUTPATIENT)
Dept: BARIATRICS/WEIGHT MGMT | Age: 37
End: 2022-04-15
Payer: COMMERCIAL

## 2022-04-15 VITALS
HEIGHT: 67 IN | RESPIRATION RATE: 20 BRPM | WEIGHT: 231 LBS | BODY MASS INDEX: 36.26 KG/M2 | SYSTOLIC BLOOD PRESSURE: 116 MMHG | TEMPERATURE: 98.2 F | HEART RATE: 77 BPM | DIASTOLIC BLOOD PRESSURE: 70 MMHG

## 2022-04-15 DIAGNOSIS — K91.2 MALNUTRITION FOLLOWING GASTROINTESTINAL SURGERY: Primary | ICD-10-CM

## 2022-04-15 PROCEDURE — 99211 OFF/OP EST MAY X REQ PHY/QHP: CPT

## 2022-04-15 PROCEDURE — 99213 OFFICE O/P EST LOW 20 MIN: CPT | Performed by: SURGERY

## 2022-04-15 NOTE — PATIENT INSTRUCTIONS

## 2022-04-15 NOTE — PROGRESS NOTES
[facility] Covert  4/15/2022  [address]     Maty-en- Y Gastric Bypass  1.5 Year Post-Operative Follow-up     Nica Willis Covert is a 39 y.o. female who is 1.5 years post Laparoscopic Maty-en-Y Gastric Bypass surgery. Reports some nausea. She is not having swallowing difficulty, is compliant most of the time with the multivitamins and calcium + Vit D. She is meeting fluid recommendations of at least 64 ounces per day and is not meeting protein recommendations. She  is not exercising: no regular exercise. Weight=231 lb (104.8 kg)  Today's weight represents a total weight loss of 55 pounds since surgery with 3 pounds regained since the lowest weight. Prior to Admission medications    Medication Sig Start Date End Date Taking? Authorizing Provider   ketorolac (TORADOL) 10 MG tablet Take 1 tablet by mouth every 8-12 hours as needed for Pain With food 3/29/22 3/29/23 Yes Clovis Acosta, DO   clobetasol (TEMOVATE) 0.05 % ointment Apply topically 2 times daily. 3/29/22  Yes Clovis Acosta, DO   Elagolix Sodium (ORILISSA) 150 MG TABS Take 150 mg by mouth daily 3/7/22  Yes Priya Higuera MD   diazePAM (VALIUM) 5 MG tablet Take 1 tablet by mouth every 8 hours as needed for Anxiety or Sleep for up to 120 days.  3/3/22 7/1/22 Yes Clovis Acosta, DO   tiZANidine (ZANAFLEX) 4 MG tablet  2/15/22  Yes Historical Provider, MD   azelastine (ASTELIN) 0.1 % nasal spray 1 spray by Nasal route 2 times daily Use in each nostril as directed 1/21/22  Yes Clovis Acosta,    atomoxetine (STRATTERA) 40 MG capsule Take 1 capsule by mouth daily 11/30/21  Yes Clovis Acosta DO   busPIRone (BUSPAR) 5 MG tablet TAKE ONE TABLET BY MOUTH 3 TIMES A DAY 11/29/21  Yes Clovis Acosta, DO   fluticasone (FLONASE) 50 MCG/ACT nasal spray    Yes Historical Provider, MD   loratadine (CLARITIN) 10 MG tablet    Yes Historical Provider, MD   citalopram (CELEXA) 40 MG tablet Take 1 tablet by mouth daily 10/21/21  Yes Clovis Acosta,    topiramate (TOPAMAX) 25 MG tablet Start 1 twice daily x 2 wks, then 1 in a.m. and 2 in p.m. x 2 wks, then 2 twice daily if needed  Patient taking differently: Take 25 mg by mouth daily  21  Yes Manuel Hawk MD   vitamin D (ERGOCALCIFEROL) 1.25 MG (87471 UT) CAPS capsule Take 1 capsule by mouth once a week 10/2/20  Yes Jamil Smallwood MD        Allergies   Allergen Reactions    Ciprofloxacin Anaphylaxis           Past Medical History:   Diagnosis Date    Allergic rhinitis     history    Anesthesia complication     pulse ox dropped    Arthritis     Chronic mixed headache syndrome 2021    Chronic sinusitis     Chronic tension-type headache, intractable 2020    Constipation     Depression with anxiety     Endometriosis     Folic acid deficiency     Migraine headache 2020    Morbidly obese ([address]     PCOS (polycystic ovarian syndrome) 2020    Prolonged emergence from general anesthesia     RLS (restless legs syndrome)     Vitamin D deficiency 2020       Past Surgical History:   Procedure Laterality Date    ACHILLES TENDON SURGERY Right 2020    FUSION FIRST TARSOMETATARSAL JOINT RIGHT TENDO ACHILLES TENDON LENGTHENING RIGHT performed by Filipe Mari DPM at [address]       SECTION  2006    CHOLECYSTECTOMY      COLONOSCOPY      DILATION AND CURETTAGE OF UTERUS      HYSTEROSCOPY  2015    [facility]   LAPAROSCOPY    SAJI-EN-Y GASTRIC BYPASS N/A 2020    GASTRIC SLEEVE CONVERT TO  SAJI-EN-Y LAPAROSCOPIC performed by Jamil Smallwood MD at [address] [place] [address]  2017    [facility]    TUBAL LIGATION  2014    Abalation    UPPER GASTROINTESTINAL ENDOSCOPY N/A 2020    EGD BIOPSY performed by Jamil Smallwood MD at [place] [address]       Current Outpatient Medications   Medication Sig Dispense Refill    ketorolac (TORADOL) 10 MG tablet Take 1 tablet by mouth every 8-12 hours as needed for Pain With food 20 tablet 0    clobetasol (TEMOVATE) 0.05 % ointment Apply topically 2 times daily. 45 g 1    Elagolix Sodium (ORILISSA) 150 MG TABS Take 150 mg by mouth daily 60 tablet 3    diazePAM (VALIUM) 5 MG tablet Take 1 tablet by mouth every 8 hours as needed for Anxiety or Sleep for up to 120 days. 90 tablet 3    tiZANidine (ZANAFLEX) 4 MG tablet       azelastine (ASTELIN) 0.1 % nasal spray 1 spray by Nasal route 2 times daily Use in each nostril as directed 60 mL 1    atomoxetine (STRATTERA) 40 MG capsule Take 1 capsule by mouth daily 30 capsule 3    busPIRone (BUSPAR) 5 MG tablet TAKE ONE TABLET BY MOUTH 3 TIMES A DAY 90 tablet 0    fluticasone (FLONASE) 50 MCG/ACT nasal spray       loratadine (CLARITIN) 10 MG tablet       citalopram (CELEXA) 40 MG tablet Take 1 tablet by mouth daily 30 tablet 5    topiramate (TOPAMAX) 25 MG tablet Start 1 twice daily x 2 wks, then 1 in a.m. and 2 in p.m. x 2 wks, then 2 twice daily if needed (Patient taking differently: Take 25 mg by mouth daily ) 124 tablet 5    vitamin D (ERGOCALCIFEROL) 1.25 MG (87393 UT) CAPS capsule Take 1 capsule by mouth once a week 12 capsule 0     No current facility-administered medications for this visit.        Allergies   Allergen Reactions    Ciprofloxacin Anaphylaxis       Family History   Problem Relation Age of Onset    High Blood Pressure Mother     High Cholesterol Mother     Arthritis Father     No Known Problems Sister     Cancer Maternal Grandmother     COPD Maternal Grandmother     Diabetes Maternal Grandmother     Elevated Lipids Maternal Grandmother     Hypertension Maternal Grandmother     Heart Disease Maternal Grandfather     Cancer Maternal Grandfather     Diabetes Maternal Grandfather        Social History     Socioeconomic History    Marital status: Legally      Spouse name: Not on file    Number of children: Not on file    Years of education: Not on file    Highest education level: Not on file   Occupational History    Not on file   Tobacco Use    Smoking status: Former Smoker     Packs/day: 0.50     Years: 22.00     Pack years: 11.00     Start date: 46     Quit date: 2015     Years since quittin.2    Smokeless tobacco: Never Used    Tobacco comment: quit smoking 2015   Vaping Use    Vaping Use: Never used   Substance and Sexual Activity    Alcohol use: Not Currently    Drug use: No    Sexual activity: Not on file   Other Topics Concern    Not on file   Social History Narrative    Not on file     Social Determinants of Health     Financial Resource Strain: Low Risk     Difficulty of Paying Living Expenses: Not hard at all   Food Insecurity: No Food Insecurity    Worried About [address] in the Last Year: Never true    [address] in the Last Year: Never true   Transportation Needs:     Lack of Transportation (Medical): Not on file    Lack of Transportation (Non-Medical):  Not on file   Physical Activity:     Days of Exercise per Week: Not on file    Minutes of Exercise per Session: Not on file   Stress:     Feeling of Stress : Not on file   Social Connections:     Frequency of Communication with Friends and Family: Not on file    Frequency of Social Gatherings with Friends and Family: Not on file    Attends Buddhist Services: Not on file    Active Member of [address] [place] [address] or Organizations: Not on file    Attends Club or Organization Meetings: Not on file    Marital Status: Not on file   Intimate Partner Violence:     Fear of Current or Ex-Partner: Not on file    Emotionally Abused: Not on file    Physically Abused: Not on file    Sexually Abused: Not on file   Housing Stability:     Unable to Pay for Housing in the Last Year: Not on file    Number of [place] in the Last Year: Not on file    Unstable Housing in the Last Year: Not on file       A complete 10 system review was performed and are otherwise negative unless mentioned in the above HPI. Specific negatives are listed below but may not include all those reviewed. General ROS: negative obtundation, AMS  ENT ROS: negative rhinorrhea, epistaxis  Allergy and Immunology ROS: negative itchy/watery eyes or nasal congestion  Hematological and Lymphatic ROS: negative spontaneous bleeding or bruising  Endocrine ROS: negative  lethargy, mood swings, palpitations or polydipsia/polyuria  Respiratory ROS: negative sputum changes, stridor, tachypnea or wheezing  Cardiovascular ROS: negative for - loss of consciousness, murmur or orthopnea  Gastrointestinal ROS: negative for - hematochezia or hematemesis  Genito-Urinary ROS: negative for -  genital discharge or hematuria  Musculoskeletal ROS: negative for - focal weakness, gangrene  Psych/Neuro ROS: negative for - visual or auditory hallucinations, suicidal ideation        Physical exam:   /70 (Site: Right Lower Arm, Position: Sitting, Cuff Size: Large Adult)   Pulse 77   Temp 98.2 °F (36.8 °C) (Temporal)   Resp 20   Ht 5' 7\" (1.702 m)   Wt 231 lb (104.8 kg)   BMI 36.18 kg/m²    General appearance: alert, appears stated age and cooperative  Head: Normocephalic, without obvious abnormality, atraumatic  Neck: no adenopathy, supple, symmetrical, trachea midline and thyroid not enlarged, symmetric, no tenderness/mass/nodules  Lungs: clear to auscultation bilaterally  Heart: regular rate and rhythm  Abdomen: soft, non-tender; bowel sounds normal; no masses,  no organomegaly  Extremities: extremities normal, atraumatic, no cyanosis or edema    Assessment: Post Maty-en- Y Gastric Bypass. She does not complain of GERD,  does not have sleep apnea,  does not have diabetes,  does not have hypertension off medical treatment. Cholesterol and triglycerides are normal. Has not been counting protein    Plan:  Doing well, weight has plateaud. Continue to eat a high protein, low calorie diet, eat small portions very slowly and chew well before swallowing. Drink plenty of water and fluids. Make sure to use fiber to keep the bowels regular. Try to exercise 7 days per week, maintain adequate variety and balance, pre-plan meals and increase intake of: vegetables, proteins and fiber. Always notify the clinic if you have any medical problems. Follow up in 6 months.     Eating and drinking carbs    2 week food diary and follow up with dietician  Needs to restart liquid protein diet after dietician appt       Physician Signature: Electronically signed by Dr. Brian Mary MD

## 2022-04-15 NOTE — PROGRESS NOTES
Patient is 18 mo LRYGB. Water intake is around 80 oz daily. Protein through shakes and foods. Vitamin intake is good. Bowel movements are good. Some stomach pain that comes sporadically. Labs are in epic.

## 2022-04-27 ENCOUNTER — OFFICE VISIT (OUTPATIENT)
Dept: FAMILY MEDICINE CLINIC | Age: 37
End: 2022-04-27
Payer: COMMERCIAL

## 2022-04-27 VITALS
OXYGEN SATURATION: 100 % | TEMPERATURE: 98.1 F | SYSTOLIC BLOOD PRESSURE: 118 MMHG | BODY MASS INDEX: 36.41 KG/M2 | HEIGHT: 67 IN | HEART RATE: 83 BPM | DIASTOLIC BLOOD PRESSURE: 62 MMHG | WEIGHT: 232 LBS

## 2022-04-27 DIAGNOSIS — G43.819 OTHER MIGRAINE WITHOUT STATUS MIGRAINOSUS, INTRACTABLE: Primary | ICD-10-CM

## 2022-04-27 DIAGNOSIS — F41.9 ANXIETY: ICD-10-CM

## 2022-04-27 DIAGNOSIS — F90.2 ATTENTION DEFICIT HYPERACTIVITY DISORDER (ADHD), COMBINED TYPE: ICD-10-CM

## 2022-04-27 PROCEDURE — 99212 OFFICE O/P EST SF 10 MIN: CPT | Performed by: FAMILY MEDICINE

## 2022-04-27 PROCEDURE — 96372 THER/PROPH/DIAG INJ SC/IM: CPT | Performed by: FAMILY MEDICINE

## 2022-04-27 RX ORDER — KETOROLAC TROMETHAMINE 30 MG/ML
30 INJECTION, SOLUTION INTRAMUSCULAR; INTRAVENOUS ONCE
Status: COMPLETED | OUTPATIENT
Start: 2022-04-27 | End: 2022-04-27

## 2022-04-27 RX ORDER — METHYLPREDNISOLONE ACETATE 80 MG/ML
80 INJECTION, SUSPENSION INTRA-ARTICULAR; INTRALESIONAL; INTRAMUSCULAR; SOFT TISSUE ONCE
Status: COMPLETED | OUTPATIENT
Start: 2022-04-27 | End: 2022-04-27

## 2022-04-27 RX ADMIN — KETOROLAC TROMETHAMINE 30 MG: 30 INJECTION, SOLUTION INTRAMUSCULAR; INTRAVENOUS at 16:22

## 2022-04-27 RX ADMIN — METHYLPREDNISOLONE ACETATE 80 MG: 80 INJECTION, SUSPENSION INTRA-ARTICULAR; INTRALESIONAL; INTRAMUSCULAR; SOFT TISSUE at 16:22

## 2022-04-27 ASSESSMENT — ENCOUNTER SYMPTOMS
SHORTNESS OF BREATH: 0
BACK PAIN: 0
DIARRHEA: 0
SORE THROAT: 0
PHOTOPHOBIA: 0
COUGH: 0
ABDOMINAL PAIN: 0
NAUSEA: 0
BLOOD IN STOOL: 0
CONSTIPATION: 0
VOMITING: 0

## 2022-04-27 NOTE — PROGRESS NOTES
Tisha Zelaya Covert (:  1985) is a 40 y.o. female,Established patient, here for evaluation of the following chief complaint(s):  Follow-up and Discuss Medications         ASSESSMENT/PLAN:  1. Other migraine without status migrainosus, intractable  -     methylPREDNISolone acetate (DEPO-MEDROL) injection 80 mg; 80 mg, IntraMUSCular, ONCE, 1 dose, On Wed 22 at 1645  -     ketorolac (TORADOL) injection 30 mg; 30 mg, IntraMUSCular, ONCE, 1 dose, On 22 at 1645Do not administer for more than 5 days  2. Anxiety  3. Attention deficit hyperactivity disorder (ADHD), combined type    Currently doing well in regards to her chronic issues. The current medication regimen is working as indicated. Currently having issues with worsening migraine for the past several days. Does believe it is a combination of stress and weather change. Will treat empirically. See her back as needed. Patient also received second COVID booster today. No follow-ups on file. Subjective   SUBJECTIVE/OBJECTIVE:  HPI  Patient presents today for scheduled follow-up in regards to chronic issues. Patient states she is taking all medications as prescribed without any side effects or adverse reactions. Chronic medications are working as indicated. Currently having an issue with worsening migraine for the last several days. Is taking her Topamax and did try to take her other medication but it has not helped her symptoms. No other acute neurological change other than previous migraine symptoms. Denies any other issues. Review of Systems   Constitutional: Negative for chills and fever. HENT: Negative for congestion, hearing loss, nosebleeds and sore throat. Eyes: Negative for photophobia. Respiratory: Negative for cough and shortness of breath. Cardiovascular: Negative for chest pain, palpitations and leg swelling.    Gastrointestinal: Negative for abdominal pain, blood in stool, constipation, diarrhea, nausea and vomiting. Endocrine: Negative for polydipsia. Genitourinary: Negative for dysuria, frequency, hematuria and urgency. Musculoskeletal: Negative for back pain and myalgias. Skin: Negative. Neurological: Positive for headaches. Negative for dizziness, tremors and weakness. Hematological: Does not bruise/bleed easily. Psychiatric/Behavioral: Positive for decreased concentration. Negative for hallucinations and suicidal ideas. The patient is not nervous/anxious. All other systems reviewed and are negative. Current Outpatient Medications:     ketorolac (TORADOL) 10 MG tablet, Take 1 tablet by mouth every 8-12 hours as needed for Pain With food, Disp: 20 tablet, Rfl: 0    clobetasol (TEMOVATE) 0.05 % ointment, Apply topically 2 times daily. , Disp: 45 g, Rfl: 1    Elagolix Sodium (ORILISSA) 150 MG TABS, Take 150 mg by mouth daily, Disp: 60 tablet, Rfl: 3    diazePAM (VALIUM) 5 MG tablet, Take 1 tablet by mouth every 8 hours as needed for Anxiety or Sleep for up to 120 days. , Disp: 90 tablet, Rfl: 3    tiZANidine (ZANAFLEX) 4 MG tablet, , Disp: , Rfl:     azelastine (ASTELIN) 0.1 % nasal spray, 1 spray by Nasal route 2 times daily Use in each nostril as directed, Disp: 60 mL, Rfl: 1    atomoxetine (STRATTERA) 40 MG capsule, Take 1 capsule by mouth daily, Disp: 30 capsule, Rfl: 3    busPIRone (BUSPAR) 5 MG tablet, TAKE ONE TABLET BY MOUTH 3 TIMES A DAY, Disp: 90 tablet, Rfl: 0    fluticasone (FLONASE) 50 MCG/ACT nasal spray, , Disp: , Rfl:     loratadine (CLARITIN) 10 MG tablet, , Disp: , Rfl:     citalopram (CELEXA) 40 MG tablet, Take 1 tablet by mouth daily, Disp: 30 tablet, Rfl: 5    topiramate (TOPAMAX) 25 MG tablet, Start 1 twice daily x 2 wks, then 1 in a.m. and 2 in p.m. x 2 wks, then 2 twice daily if needed (Patient taking differently: Take 25 mg by mouth daily ), Disp: 124 tablet, Rfl: 5    vitamin D (ERGOCALCIFEROL) 1.25 MG (13408 UT) CAPS capsule, Take 1 capsule by mouth once a week, Disp: 12 capsule, Rfl: 0   Patient Active Problem List   Diagnosis    Recurrent acute suppurative otitis media of right ear without spontaneous rupture of tympanic membrane    Acute otitis externa of right ear    Migraine headache    PCOS (polycystic ovarian syndrome)    Vitamin D deficiency    Folic acid deficiency    Chronic tension-type headache, intractable    Diarrhea    Non-intractable vomiting    Dysmenorrhea    Right ovarian cyst    GERD with esophagitis    Morbid obesity due to excess calories (HCC)    Disruption or dehiscence of closure of muscle or muscle flap    Chronic mixed headache syndrome    Acute bacterial sinusitis    Major depressive disorder, single episode, unspecified    Anxiety    Arthritis    Breast lump    Ex-smoker    Fatigue    History of bariatric surgery    History of gastric bypass    Restless legs    S/P laparoscopic sleeve gastrectomy    Attention deficit hyperactivity disorder (ADHD), combined type    Right-sided low back pain without sciatica    Dermatitis, unspecified     Past Medical History:   Diagnosis Date    Allergic rhinitis     history    Anesthesia complication     pulse ox dropped    Arthritis     Chronic mixed headache syndrome 2021    Chronic sinusitis     Chronic tension-type headache, intractable 2020    Constipation     Depression with anxiety     Endometriosis     Folic acid deficiency     Migraine headache 2020    Morbidly obese (HCC)     PCOS (polycystic ovarian syndrome) 2020    Prolonged emergence from general anesthesia     RLS (restless legs syndrome)     Vitamin D deficiency 2020     Past Surgical History:   Procedure Laterality Date    ACHILLES TENDON SURGERY Right 2020    FUSION FIRST TARSOMETATARSAL JOINT RIGHT TENDO ACHILLES TENDON LENGTHENING RIGHT performed by Edna Mckeon DPM at [facility]       SECTION      CHOLECYSTECTOMY      COLONOSCOPY      DILATION AND CURETTAGE OF UTERUS      HYSTEROSCOPY  2015    [facility]   LAPAROSCOPY    SAJI-EN-Y GASTRIC BYPASS N/A 2020    GASTRIC SLEEVE CONVERT TO  SAJI-EN-Y LAPAROSCOPIC performed by Mariia Durán MD at [address]  2017    [facility]    TUBAL LIGATION  2014    Abalation    UPPER GASTROINTESTINAL ENDOSCOPY N/A 2020    EGD BIOPSY performed by Mariia Durán MD at [address] History     Socioeconomic History    Marital status: Legally      Spouse name: Not on file    Number of children: Not on file    Years of education: Not on file    Highest education level: Not on file   Occupational History    Not on file   Tobacco Use    Smoking status: Former Smoker     Packs/day: 0.50     Years: 22.00     Pack years: 11.00     Start date: 46     Quit date: 2015     Years since quittin.3    Smokeless tobacco: Never Used    Tobacco comment: quit smoking 2015   Vaping Use    Vaping Use: Never used   Substance and Sexual Activity    Alcohol use: Not Currently    Drug use: No    Sexual activity: Not on file   Other Topics Concern    Not on file   Social History Narrative    Not on file     Social Determinants of Health     Financial Resource Strain: Low Risk     Difficulty of Paying Living Expenses: Not hard at all   Food Insecurity: No Food Insecurity    Worried About [address] in the Last Year: Never true    [address] in the Last Year: Never true   Transportation Needs:     Lack of Transportation (Medical): Not on file    Lack of Transportation (Non-Medical):  Not on file   Physical Activity:     Days of Exercise per Week: Not on file    Minutes of Exercise per Session: Not on file   Stress:     Feeling of Stress : Not on file   Social Connections:     Frequency of Communication with Friends and Family: Not on file    Frequency of Social Gatherings with Friends and Family: Not on file    Attends Catholic Services: Not on file    Active Member of Clubs or Organizations: Not on file    Attends Club or Organization Meetings: Not on file    Marital Status: Not on file   Intimate Partner Violence:     Fear of Current or Ex-Partner: Not on file    Emotionally Abused: Not on file    Physically Abused: Not on file    Sexually Abused: Not on file   Housing Stability:     Unable to Pay for Housing in the Last Year: Not on file    Number of [place] in the Last Year: Not on file    Unstable Housing in the Last Year: Not on file     Family History   Problem Relation Age of Onset    High Blood Pressure Mother     High Cholesterol Mother     Arthritis Father     No Known Problems Sister     Cancer Maternal Grandmother     COPD Maternal Grandmother     Diabetes Maternal Grandmother     Elevated Lipids Maternal Grandmother     Hypertension Maternal Grandmother     Heart Disease Maternal Grandfather     Cancer Maternal Grandfather     Diabetes Maternal Grandfather       There are no preventive care reminders to display for this patient. There are no preventive care reminders to display for this patient. There are no preventive care reminders to display for this patient. There are no preventive care reminders to display for this patient.    Health Maintenance   Topic Date Due    Varicella vaccine (1 of 2 - 2-dose childhood series) Never done    HIV screen  Never done    COVID-19 Vaccine (3 - Booster for Moderna series) 10/07/2021    Depression Monitoring  08/30/2022    Cervical cancer screen  02/21/2025    DTaP/Tdap/Td vaccine (2 - Td or Tdap) 03/16/2031    Flu vaccine  Completed    Hepatitis C screen  Completed    Hepatitis A vaccine  Aged Out    Hepatitis B vaccine  Aged Out    Hib vaccine  Aged Out    Meningococcal (ACWY) vaccine  Aged Out    Pneumococcal 0-64 years Vaccine  Aged Out      There are no preventive care reminders to display for this patient. There are no preventive care reminders to display for this patient. /62   Pulse 83   Temp 98.1 °F (36.7 °C)   Ht 5' 7\" (1.702 m)   Wt 232 lb (105.2 kg)   SpO2 100%   BMI 36.34 kg/m²     Objective   Physical Exam  Vitals reviewed. Constitutional:       Appearance: She is obese. She is ill-appearing. HENT:      Head: Normocephalic and atraumatic. Eyes:      General: No scleral icterus. Conjunctiva/sclera: Conjunctivae normal.      Pupils: Pupils are equal, round, and reactive to light. Neck:      Thyroid: No thyromegaly. Cardiovascular:      Rate and Rhythm: Normal rate and regular rhythm. Heart sounds: Normal heart sounds. No murmur heard. Pulmonary:      Effort: Pulmonary effort is normal.      Breath sounds: Normal breath sounds. No rales. Abdominal:      General: Bowel sounds are normal. There is no distension. Palpations: Abdomen is soft. Tenderness: There is no abdominal tenderness. Musculoskeletal:         General: Normal range of motion. Cervical back: Neck supple. Lymphadenopathy:      Cervical: No cervical adenopathy. Skin:     General: Skin is warm and dry. Findings: No erythema or rash. Neurological:      Mental Status: She is alert and oriented to person, place, and time. Cranial Nerves: No cranial nerve deficit. Motor: No weakness. Gait: Gait normal.   Psychiatric:         Judgment: Judgment normal.                  An electronic signature was used to authenticate this note.     --Bj Acosta DO

## 2022-05-03 ENCOUNTER — TELEPHONE (OUTPATIENT)
Dept: BARIATRICS/WEIGHT MGMT | Age: 37
End: 2022-05-03

## 2022-05-03 NOTE — TELEPHONE ENCOUNTER
Pt was scheduled today for a consult regarding pouch reset diet. Pt did not show. Called pt and she was unavailable. Voice mailbox full, so, unable to leave voice message for pt.

## 2022-06-29 ENCOUNTER — OFFICE VISIT (OUTPATIENT)
Dept: CHIROPRACTIC MEDICINE | Age: 37
End: 2022-06-29

## 2022-06-29 VITALS — OXYGEN SATURATION: 97 % | TEMPERATURE: 97.8 F | HEART RATE: 89 BPM

## 2022-06-29 DIAGNOSIS — M54.2 CERVICALGIA: Primary | ICD-10-CM

## 2022-06-29 DIAGNOSIS — M62.830 MUSCLE SPASM OF BACK: ICD-10-CM

## 2022-06-29 DIAGNOSIS — M54.04 PANNICULITIS AFFECTING REGIONS OF NECK AND BACK, THORACIC REGION: ICD-10-CM

## 2022-06-29 PROCEDURE — 98940 CHIROPRACT MANJ 1-2 REGIONS: CPT | Performed by: CHIROPRACTOR

## 2022-06-29 PROCEDURE — 99999 PR OFFICE/OUTPT VISIT,PROCEDURE ONLY: CPT | Performed by: CHIROPRACTOR

## 2022-06-29 NOTE — PROGRESS NOTES
Patient is here for neck and back pain. Patient states she's stiff and sore.  Orville Chavira DO  Electronically signed by Loretta Chapman LPN on 5/26/5329 at 8:22 PM

## 2022-06-29 NOTE — PROGRESS NOTES
22  Rafe Aase Randall Covert : 1985 Sex: female  Age: 40 y.o. Chief Complaint   Patient presents with    Back Pain    Neck Pain       HPI:   Pain has worsened. On average, pain is perceived as mild (3  pain scale). Patient denies new numbness, new weakness, new tingling. With work, studying she is just felt tight and stiff through the neck and mid back. She has no rating pain to the extremities. No new reported symptoms otherwise. Current Outpatient Medications:     ketorolac (TORADOL) 10 MG tablet, Take 1 tablet by mouth every 8-12 hours as needed for Pain With food, Disp: 20 tablet, Rfl: 0    clobetasol (TEMOVATE) 0.05 % ointment, Apply topically 2 times daily. , Disp: 45 g, Rfl: 1    Elagolix Sodium (ORILISSA) 150 MG TABS, Take 150 mg by mouth daily, Disp: 60 tablet, Rfl: 3    diazePAM (VALIUM) 5 MG tablet, Take 1 tablet by mouth every 8 hours as needed for Anxiety or Sleep for up to 120 days. , Disp: 90 tablet, Rfl: 3    tiZANidine (ZANAFLEX) 4 MG tablet, , Disp: , Rfl:     azelastine (ASTELIN) 0.1 % nasal spray, 1 spray by Nasal route 2 times daily Use in each nostril as directed, Disp: 60 mL, Rfl: 1    atomoxetine (STRATTERA) 40 MG capsule, Take 1 capsule by mouth daily, Disp: 30 capsule, Rfl: 3    busPIRone (BUSPAR) 5 MG tablet, TAKE ONE TABLET BY MOUTH 3 TIMES A DAY, Disp: 90 tablet, Rfl: 0    fluticasone (FLONASE) 50 MCG/ACT nasal spray, , Disp: , Rfl:     loratadine (CLARITIN) 10 MG tablet, , Disp: , Rfl:     citalopram (CELEXA) 40 MG tablet, Take 1 tablet by mouth daily, Disp: 30 tablet, Rfl: 5    topiramate (TOPAMAX) 25 MG tablet, Start 1 twice daily x 2 wks, then 1 in a.m. and 2 in p.m. x 2 wks, then 2 twice daily if needed (Patient taking differently: Take 25 mg by mouth daily ), Disp: 124 tablet, Rfl: 5    vitamin D (ERGOCALCIFEROL) 1.25 MG (12919 UT) CAPS capsule, Take 1 capsule by mouth once a week, Disp: 12 capsule, Rfl: 0    Exam:   Vitals:    22 1541 Pulse: 89   Temp: 97.8 °F (36.6 °C)   SpO2: 97%   She appears well per no apparent distress. Alert and oriented x3. Trigger points bilateral trapezius today. No midline pain in the cervical or thoracic region. There are hypertonic and tender fibers noted with palpation in the paraspinal muscles of the cervical, thoracic region. Joint fixation is noted with motion screening at C4-6, T3-6, T11-12. Rene Ruben was seen today for back pain and neck pain. Diagnoses and all orders for this visit:    Cervicalgia    Panniculitis affecting regions of neck and back, thoracic region    Muscle spasm of back        Treatment Plan:      Diversified manipulation to the above-listed segments in the cervical spine and thoracic spine. She noted immediate relief following care. Continue with home-based self treatment including self stretches.   I will see her back as needed for treatment    Seen By:  Wang Goldberg

## 2022-08-25 ENCOUNTER — OFFICE VISIT (OUTPATIENT)
Dept: FAMILY MEDICINE CLINIC | Age: 37
End: 2022-08-25

## 2022-08-25 VITALS
BODY MASS INDEX: 34.93 KG/M2 | DIASTOLIC BLOOD PRESSURE: 60 MMHG | HEART RATE: 90 BPM | TEMPERATURE: 99 F | OXYGEN SATURATION: 95 % | WEIGHT: 223 LBS | SYSTOLIC BLOOD PRESSURE: 120 MMHG

## 2022-08-25 DIAGNOSIS — J06.9 VIRAL URI WITH COUGH: Primary | ICD-10-CM

## 2022-08-25 PROCEDURE — 99213 OFFICE O/P EST LOW 20 MIN: CPT | Performed by: NURSE PRACTITIONER

## 2022-08-25 PROCEDURE — 96372 THER/PROPH/DIAG INJ SC/IM: CPT | Performed by: NURSE PRACTITIONER

## 2022-08-25 RX ORDER — TRIAMCINOLONE ACETONIDE 40 MG/ML
40 INJECTION, SUSPENSION INTRA-ARTICULAR; INTRAMUSCULAR ONCE
Status: COMPLETED | OUTPATIENT
Start: 2022-08-25 | End: 2022-08-25

## 2022-08-25 RX ORDER — AZITHROMYCIN 250 MG/1
250 TABLET, FILM COATED ORAL SEE ADMIN INSTRUCTIONS
Qty: 6 TABLET | Refills: 0 | Status: SHIPPED | OUTPATIENT
Start: 2022-08-25 | End: 2022-08-30

## 2022-08-25 RX ADMIN — TRIAMCINOLONE ACETONIDE 40 MG: 40 INJECTION, SUSPENSION INTRA-ARTICULAR; INTRAMUSCULAR at 08:23

## 2022-08-25 NOTE — PROGRESS NOTES
Chief Complaint   Pharyngitis, Generalized Body Aches, and Otalgia    History of Present Illness   Source of history provided by:  patient. Lizeth Wall is a 40 y.o. old female who presents to walk-in with complaints of Fever, Generalized Body Aches, Pharyngitis, Nasal Congestion, Post nasal drip, and dry Cough x 1 day. States symptoms have been unchanged since onset. Has been taking Tylenol without symptomatic relief. Currently denies any Shortness of breath, Nausea, Vomiting, Chest Pain, Abdominal Pain, Rash, Lethargy, or Close contact with a lab confirmed COVID-19 patient within 14 days of symptom onset . Denies any hx of asthma. Denies tobacco use. Patient reports history of COVID-19. Patient is  vaccinated for COVID-19. Took COVID test at home that was negative. ROS   Pertinent positives and negatives are stated within HPI, all other systems reviewed and are negative. Past Medical History:  has a past medical history of Allergic rhinitis, Anesthesia complication, Arthritis, Chronic mixed headache syndrome, Chronic sinusitis, Chronic tension-type headache, intractable, Constipation, Depression with anxiety, Endometriosis, Folic acid deficiency, Migraine headache, Morbidly obese ([address] PCOS (polycystic ovarian syndrome), Prolonged emergence from general anesthesia, RLS (restless legs syndrome), and Vitamin D deficiency. Past Surgical History:  has a past surgical history that includes Cholecystectomy (); hysteroscopy (2015); Sleeve Gastrectomy (); Appendectomy ();  section (); Dilation and curettage of uterus (); Tubal ligation (); Upper gastrointestinal endoscopy (N/A, 2020); Colonoscopy; Maty-en-Y Gastric Bypass (N/A, 2020); and Achilles tendon surgery (Right, 2020). Social History:  reports that she quit smoking about 7 years ago. She started smoking about 29 years ago. She has a 11.00 pack-year smoking history.  She has never used smokeless tobacco. She reports that she does not currently use alcohol. She reports that she does not use drugs. Family History: family history includes Arthritis in her father; COPD in her maternal grandmother; Cancer in her maternal grandfather and maternal grandmother; Diabetes in her maternal grandfather and maternal grandmother; Elevated Lipids in her maternal grandmother; Heart Disease in her maternal grandfather; High Blood Pressure in her mother; High Cholesterol in her mother; Hypertension in her maternal grandmother; No Known Problems in her sister. Allergies: Ciprofloxacin    Physical Exam   Vital Signs:  /60   Pulse 90   Temp 99 °F (37.2 °C)   Wt 223 lb (101.2 kg)   SpO2 95%   BMI 34.93 kg/m²    Oxygen Saturation Interpretation: Normal.    Constitutional:  Alert, development consistent with age. NAD. Head:  NC/NT. Airway patent. Ears: TMs with clear serous effusion bilaterally. Canals without exudate or swelling bilaterally. Mouth: Posterior pharynx with mild erythema and clear postnasal drip. There is no tonsillar hypertrophy or exudate. Neck:  Normal ROM. Supple. There is no anterior cervical adenopathy noted. Lungs: CTAB without wheezes, rales, or rhonchi. CV:  Regular rate and rhythm, normal heart sounds, without pathological murmurs, ectopy, gallops, or rubs. Skin:  Normal turgor. Warm, dry, without visible rash. Lymphatic: No lymphangitis or adenopathy noted. Neurological:  Oriented. Motor functions intact. Lab / Imaging Results   (All laboratory and radiology results have been personally reviewed by myself)  Labs:  No results found for this visit on 08/25/22. Imaging: All Radiology results interpreted by Radiologist unless otherwise noted. No results found. Medical Decision Making   Pt non-toxic, in no apparent distress and stable at time of discharge.      Assessment/Plan   Joshua Jay was seen today for pharyngitis, generalized body aches and otalgia. Diagnoses and all orders for this visit:    Viral URI with cough  -     triamcinolone acetonide (KENALOG-40) injection 40 mg  -     azithromycin (ZITHROMAX) 250 MG tablet; Take 1 tablet by mouth See Admin Instructions for 5 days 500mg on day 1 followed by 250mg on days 2 - 5    Rapid COVID-19 negative at home, does not wish to be retested here today. Kenalog injection given in office. Prescription for azithromycin sent to pharmacy that patient will hold and take in 5-7 days if symptoms persist. Increase fluids and rest. Symptomatic relief discussed including Tylenol prn pain/fever. Schedule f/u with PCP in 7-10 days if symptoms persist. ED sooner if symptoms worsen or change. ED immediately with high or refractory fever, progressive SOB, dyspnea, CP, calf pain/swelling, shaking chills, vomiting, abdominal pain, lethargy, flank pain, or decreased urinary output. Pt verbalizes understanding and is in agreement with plan of care. All questions answered. Return if symptoms worsen or fail to improve. Electronically signed by COLLINS Campbell CNP   DD: 8/25/22    **This report was transcribed using voice recognition software. Every effort was made to ensure accuracy; however, inadvertent computerized transcription errors may be present.

## 2022-09-21 ENCOUNTER — OFFICE VISIT (OUTPATIENT)
Dept: FAMILY MEDICINE CLINIC | Age: 37
End: 2022-09-21

## 2022-09-21 VITALS
BODY MASS INDEX: 34.5 KG/M2 | HEIGHT: 67 IN | OXYGEN SATURATION: 97 % | SYSTOLIC BLOOD PRESSURE: 120 MMHG | DIASTOLIC BLOOD PRESSURE: 72 MMHG | TEMPERATURE: 98 F | HEART RATE: 80 BPM | WEIGHT: 219.8 LBS

## 2022-09-21 DIAGNOSIS — F90.9 ATTENTION DEFICIT HYPERACTIVITY DISORDER (ADHD), UNSPECIFIED ADHD TYPE: ICD-10-CM

## 2022-09-21 DIAGNOSIS — M94.0 COSTOCHONDRITIS, ACUTE: Primary | ICD-10-CM

## 2022-09-21 PROCEDURE — 99213 OFFICE O/P EST LOW 20 MIN: CPT | Performed by: FAMILY MEDICINE

## 2022-09-21 RX ORDER — ACETAMINOPHEN 325 MG/1
650 TABLET ORAL EVERY 6 HOURS PRN
COMMUNITY

## 2022-09-21 RX ORDER — ATOMOXETINE 40 MG/1
40 CAPSULE ORAL DAILY
Qty: 30 CAPSULE | Refills: 3 | Status: SHIPPED | OUTPATIENT
Start: 2022-09-21

## 2022-09-21 RX ORDER — KETOROLAC TROMETHAMINE 30 MG/ML
30 INJECTION, SOLUTION INTRAMUSCULAR; INTRAVENOUS ONCE
Status: COMPLETED | OUTPATIENT
Start: 2022-09-21 | End: 2022-09-21

## 2022-09-21 RX ORDER — CITALOPRAM 40 MG/1
40 TABLET ORAL DAILY
Qty: 30 TABLET | Refills: 5 | Status: SHIPPED | OUTPATIENT
Start: 2022-09-21

## 2022-09-21 RX ORDER — HYDROCODONE BITARTRATE AND ACETAMINOPHEN 5; 325 MG/1; MG/1
1 TABLET ORAL EVERY 4 HOURS PRN
Qty: 30 TABLET | Refills: 0 | Status: SHIPPED | OUTPATIENT
Start: 2022-09-21 | End: 2022-09-26

## 2022-09-21 RX ADMIN — KETOROLAC TROMETHAMINE 30 MG: 30 INJECTION, SOLUTION INTRAMUSCULAR; INTRAVENOUS at 16:27

## 2022-09-21 ASSESSMENT — PATIENT HEALTH QUESTIONNAIRE - PHQ9
SUM OF ALL RESPONSES TO PHQ QUESTIONS 1-9: 0
2. FEELING DOWN, DEPRESSED OR HOPELESS: 0
1. LITTLE INTEREST OR PLEASURE IN DOING THINGS: 0
SUM OF ALL RESPONSES TO PHQ QUESTIONS 1-9: 0
SUM OF ALL RESPONSES TO PHQ9 QUESTIONS 1 & 2: 0

## 2022-09-21 NOTE — PROGRESS NOTES
Bettye Agarwalt (:  1985) is a 40 y.o. female,Established patient, here for evaluation of the following chief complaint(s):  Chest Pain (L rib, Onset x1 week. Pt states she was getting hugged and her friend stumbled and squeezed too hard. She states she heard a popping sound.)         ASSESSMENT/PLAN:  1. Costochondritis, acute  -     XR RIBS LEFT INCLUDE CHEST (MIN 3 VIEWS); Future  -     ketorolac (TORADOL) injection 30 mg; 30 mg, IntraMUSCular, ONCE, 1 dose, On 22 at 1645Do not administer for more than 5 days  -     HYDROcodone-acetaminophen (NORCO) 5-325 MG per tablet; Take 1 tablet by mouth every 4 hours as needed for Pain for up to 5 days. Intended supply: 5 days. Take lowest dose possible to manage pain, Disp-30 tablet, R-0Normal  2. Attention deficit hyperactivity disorder (ADHD), unspecified ADHD type  -     atomoxetine (STRATTERA) 40 MG capsule; Take 1 capsule by mouth daily, Disp-30 capsule, R-3Normal  Time we will refill medications and treat symptomatically. Patient advised regarding red flag signs or symptoms. If these occur she is to go directly to the nearest emergency department. Patient voiced understanding. [3 to 6 months or sooner if needed. Review of x-ray shows no acute fracture or dislocation. Final read per radiologist.    No follow-ups on file. Subjective   SUBJECTIVE/OBJECTIVE:  Chest Pain     Patient presents today for left rib pain. Patient states that this is occurred for the last week. Patient states that she was being hugged by a friend when she was squeezed too hard and heard a popping sound. Since that time there has been pain to the left anterior lower rib cage. She states that the pain is kept her up at night as well. Denies any chest pain or shortness of breath. Denies any productive cough. Denies any fever or chills. Denies any hemoptysis. Review of Systems   Cardiovascular:  Positive for chest pain (Left lower ribs).    All other systems reviewed and are negative. Current Outpatient Medications:     acetaminophen (TYLENOL) 325 MG tablet, Take 650 mg by mouth every 6 hours as needed for Pain, Disp: , Rfl:     HYDROcodone-acetaminophen (NORCO) 5-325 MG per tablet, Take 1 tablet by mouth every 4 hours as needed for Pain for up to 5 days. Intended supply: 5 days. Take lowest dose possible to manage pain, Disp: 30 tablet, Rfl: 0    citalopram (CELEXA) 40 MG tablet, Take 1 tablet by mouth daily, Disp: 30 tablet, Rfl: 5    atomoxetine (STRATTERA) 40 MG capsule, Take 1 capsule by mouth daily, Disp: 30 capsule, Rfl: 3    ketorolac (TORADOL) 10 MG tablet, Take 1 tablet by mouth every 8-12 hours as needed for Pain With food, Disp: 20 tablet, Rfl: 0    clobetasol (TEMOVATE) 0.05 % ointment, Apply topically 2 times daily. , Disp: 45 g, Rfl: 1    azelastine (ASTELIN) 0.1 % nasal spray, 1 spray by Nasal route 2 times daily Use in each nostril as directed, Disp: 60 mL, Rfl: 1    fluticasone (FLONASE) 50 MCG/ACT nasal spray, , Disp: , Rfl:     loratadine (CLARITIN) 10 MG tablet, , Disp: , Rfl:     topiramate (TOPAMAX) 25 MG tablet, Start 1 twice daily x 2 wks, then 1 in a.m. and 2 in p.m. x 2 wks, then 2 twice daily if needed (Patient taking differently: Take 25 mg by mouth daily), Disp: 124 tablet, Rfl: 5    vitamin D (ERGOCALCIFEROL) 1.25 MG (71383 UT) CAPS capsule, Take 1 capsule by mouth once a week, Disp: 12 capsule, Rfl: 0    Elagolix Sodium (ORILISSA) 150 MG TABS, Take 150 mg by mouth daily (Patient not taking: No sig reported), Disp: 60 tablet, Rfl: 3    busPIRone (BUSPAR) 5 MG tablet, TAKE ONE TABLET BY MOUTH 3 TIMES A DAY (Patient not taking: No sig reported), Disp: 90 tablet, Rfl: 0   Patient Active Problem List   Diagnosis    Recurrent acute suppurative otitis media of right ear without spontaneous rupture of tympanic membrane    Acute otitis externa of right ear    Migraine headache    PCOS (polycystic ovarian syndrome)    Vitamin D deficiency    Folic acid deficiency    Chronic tension-type headache, intractable    Diarrhea    Non-intractable vomiting    Dysmenorrhea    Right ovarian cyst    GERD with esophagitis    Morbid obesity due to excess calories (HCC)    Disruption or dehiscence of closure of muscle or muscle flap    Chronic mixed headache syndrome    Acute bacterial sinusitis    Major depressive disorder, single episode, unspecified    Anxiety    Arthritis    Breast lump    Ex-smoker    Fatigue    History of bariatric surgery    History of gastric bypass    Restless legs    S/P laparoscopic sleeve gastrectomy    Attention deficit hyperactivity disorder (ADHD)    Right-sided low back pain without sciatica    Dermatitis, unspecified    Costochondritis, acute     Past Medical History:   Diagnosis Date    Allergic rhinitis     history    Anesthesia complication     pulse ox dropped    Arthritis     Chronic mixed headache syndrome 2021    Chronic sinusitis     Chronic tension-type headache, intractable 2020    Constipation     Depression with anxiety     Endometriosis     Folic acid deficiency     Migraine headache 2020    Morbidly obese (HCC)     PCOS (polycystic ovarian syndrome) 2020    Prolonged emergence from general anesthesia     RLS (restless legs syndrome)     Vitamin D deficiency 2020     Past Surgical History:   Procedure Laterality Date    ACHILLES TENDON SURGERY Right 2020    FUSION FIRST TARSOMETATARSAL JOINT RIGHT TENDO ACHILLES TENDON LENGTHENING RIGHT performed by Laura Dickinson DPM at [address]       SECTION  2006    CHOLECYSTECTOMY      COLONOSCOPY      DILATION AND CURETTAGE OF UTERUS  2013    HYSTEROSCOPY  2015    D & C   LAPAROSCOPY    SAJI-EN-Y GASTRIC BYPASS N/A 2020    GASTRIC SLEEVE CONVERT TO  SAJI-EN-Y LAPAROSCOPIC [facility]  [facility]    [facility]  2014    Abalation UPPER GASTROINTESTINAL ENDOSCOPY N/A 2020    EGD BIOPSY performed by King Edvin MD at [facility] History     Socioeconomic History    Marital status: Legally      Spouse name: Not on file    Number of children: Not on file    Years of education: Not on file    Highest education level: Not on file   Occupational History    Not on file   Tobacco Use    Smoking status: Former     Packs/day: 0.50     Years: 22.00     Pack years: 11.00     Types: Cigarettes     Start date: 46     Quit date: 2015     Years since quittin.7    Smokeless tobacco: Never    Tobacco comments:     quit smoking 2015   Vaping Use    Vaping Use: Never used   Substance and Sexual Activity    Alcohol use: Not Currently    Drug use: No    Sexual activity: Not on file   Other Topics Concern    Not on file   Social History Narrative    Not on file     Social Determinants of Health     Financial Resource Strain: Low Risk     Difficulty of Paying Living Expenses: Not hard at all   Food Insecurity: No Food Insecurity    Worried About Running Out of Food in the Last Year: Never true    Ran Out of Food in the Last Year: Never true   Transportation Needs: Not on file   Physical Activity: Not on file   Stress: Not on file   Social Connections: Not on file   Intimate Partner Violence: Not on file   Housing Stability: Not on file     Family History   Problem Relation Age of Onset    High Blood Pressure Mother     High Cholesterol Mother     Arthritis Father     No Known Problems Sister     Cancer Maternal Grandmother     COPD Maternal Grandmother     Diabetes Maternal Grandmother     Elevated Lipids Maternal Grandmother     Hypertension Maternal Grandmother     Heart Disease Maternal Grandfather     Cancer Maternal Grandfather     Diabetes Maternal Grandfather       There are no preventive care reminders to display for this patient. There are no preventive care reminders to display for this patient.    There

## 2022-09-22 ENCOUNTER — TELEPHONE (OUTPATIENT)
Dept: FAMILY MEDICINE CLINIC | Age: 37
End: 2022-09-22

## 2022-11-09 ENCOUNTER — OFFICE VISIT (OUTPATIENT)
Dept: CHIROPRACTIC MEDICINE | Age: 37
End: 2022-11-09

## 2022-11-09 ENCOUNTER — PATIENT MESSAGE (OUTPATIENT)
Dept: FAMILY MEDICINE CLINIC | Age: 37
End: 2022-11-09

## 2022-11-09 VITALS
WEIGHT: 219 LBS | BODY MASS INDEX: 34.37 KG/M2 | HEART RATE: 87 BPM | OXYGEN SATURATION: 96 % | HEIGHT: 67 IN | TEMPERATURE: 98.8 F

## 2022-11-09 DIAGNOSIS — F43.22 ADJUSTMENT DISORDER WITH ANXIETY: ICD-10-CM

## 2022-11-09 DIAGNOSIS — M54.50 ACUTE MIDLINE LOW BACK PAIN WITHOUT SCIATICA: Primary | ICD-10-CM

## 2022-11-09 DIAGNOSIS — M54.50 RIGHT-SIDED LOW BACK PAIN WITHOUT SCIATICA, UNSPECIFIED CHRONICITY: ICD-10-CM

## 2022-11-09 PROCEDURE — 98940 CHIROPRACT MANJ 1-2 REGIONS: CPT | Performed by: CHIROPRACTOR

## 2022-11-09 PROCEDURE — 99999 PR OFFICE/OUTPT VISIT,PROCEDURE ONLY: CPT | Performed by: CHIROPRACTOR

## 2022-11-09 NOTE — LETTER
[address] [place] [address]  Shannen B[place] [address]  Phone: [phone]  [facility]ax: [phone]        November 9, 2022     Patient: Constance Elliott Covert   YOB: 1985   Date of Visit: 11/9/2022       To Whom It May Concern: It is my medical opinion that Merary De Dios Coverhanane may return to work on 11/10/22. If you have any questions or concerns, please don't hesitate to call.     Sincerely,        Tisha Catalan DC

## 2022-11-09 NOTE — PROGRESS NOTES
Patient is here for adjustment. Patient states no injury.   Giulia Alfredo, DO  Electronically signed by Kathie Suarez LPN on 88/5/8034 at 61:06 AM

## 2022-11-10 RX ORDER — KETOROLAC TROMETHAMINE 10 MG/1
10 TABLET, FILM COATED ORAL
Qty: 20 TABLET | Refills: 0 | Status: SHIPPED | OUTPATIENT
Start: 2022-11-10 | End: 2023-11-10

## 2022-11-11 RX ORDER — DIAZEPAM 5 MG/1
5 TABLET ORAL EVERY 8 HOURS PRN
Qty: 90 TABLET | Refills: 3 | Status: SHIPPED | OUTPATIENT
Start: 2022-11-11 | End: 2023-03-11

## 2022-11-11 NOTE — TELEPHONE ENCOUNTER
From: [facility] Randall Covert  To: Dr. Anabelle Kothari: 11/9/2022 11:13 PM EST  Subject: Meds    Hello I was trying to refill my Valium but it fell off my list. Didi Meth know if I need to come in or if it could be sent. I also requested ketorolac I hurt my back again Tylenol isnt helping at all and Didnt think I could ask for a norco refill.      Have a great day

## 2022-11-14 ENCOUNTER — OFFICE VISIT (OUTPATIENT)
Dept: BARIATRICS/WEIGHT MGMT | Age: 37
End: 2022-11-14

## 2022-11-14 VITALS
TEMPERATURE: 97.9 F | RESPIRATION RATE: 20 BRPM | SYSTOLIC BLOOD PRESSURE: 119 MMHG | HEART RATE: 74 BPM | DIASTOLIC BLOOD PRESSURE: 72 MMHG | HEIGHT: 67 IN | WEIGHT: 208 LBS | BODY MASS INDEX: 32.65 KG/M2

## 2022-11-14 DIAGNOSIS — R10.13 EPIGASTRIC PAIN: Primary | ICD-10-CM

## 2022-11-14 DIAGNOSIS — R11.0 NAUSEA: ICD-10-CM

## 2022-11-14 PROCEDURE — 99211 OFF/OP EST MAY X REQ PHY/QHP: CPT | Performed by: NURSE PRACTITIONER

## 2022-11-14 PROCEDURE — 99213 OFFICE O/P EST LOW 20 MIN: CPT | Performed by: NURSE PRACTITIONER

## 2022-11-14 RX ORDER — ONDANSETRON 4 MG/1
4 TABLET, ORALLY DISINTEGRATING ORAL 3 TIMES DAILY PRN
Qty: 21 TABLET | Refills: 0 | Status: SHIPPED | OUTPATIENT
Start: 2022-11-14

## 2022-11-14 RX ORDER — SUCRALFATE 1 G/1
0.5 TABLET ORAL 4 TIMES DAILY
Qty: 60 TABLET | Refills: 1 | Status: SHIPPED | OUTPATIENT
Start: 2022-11-14

## 2022-11-14 RX ORDER — OMEPRAZOLE 20 MG/1
20 CAPSULE, DELAYED RELEASE ORAL
Qty: 60 CAPSULE | Refills: 0 | Status: SHIPPED | OUTPATIENT
Start: 2022-11-14

## 2022-11-14 NOTE — PROGRESS NOTES
Chief Complaint:       Nausea & Vomiting and Abdominal Pain      History of Present Illness   Source of history provided by:  patient. Sue Wall is a 40 y.o. old female who presents to walk-in for complaints of gradual onset of abdominal pain for the past week. She reports that she has had some bloating and gas pains that began about 1 week ago. Since that time she reports that she has been unable to keep solid food down, as well as pain/nausea and reports that she feels like there is a twisting sensation to her epigastric region. She reports that this pain will last for about an hour after she eats. Has also had episodes of lose stool. Because of this she had tried to eat some toast. Became nauseated and was dry heaving, She has been able to stay hydrated. Reports that water does not bother her. She reports that protein shakes hurt her stomach as well. She reports that in the past when she eats too fast she will get a pressure type of feeling, but reports that this is worse for her. Since onset the symptoms have been persistent. The pain is associated with none and nothing pertinent. The pain is aggravated by eating and relieved by nothing. There has been NO chills, diarrhea, dysuria, or urinary frequency. ROS    Unless otherwise stated in this report or unable to obtain because of the patient's clinical or mental status as evidenced by the medical record, this patients's positive and negative responses for Review of Systems, constitutional, psych, eyes, ENT, cardiovascular, respiratory, gastrointestinal, neurological, genitourinary, musculoskeletal, integument systems and systems related to the presenting problem are either stated in the preceding or were not pertinent or were negative for the symptoms and/or complaints related to the medical problem.     Past Medical History:  has a past medical history of Allergic rhinitis, Anesthesia complication, Arthritis, Chronic mixed headache syndrome, Chronic sinusitis, Chronic tension-type headache, intractable, Constipation, Depression with anxiety, Endometriosis, Folic acid deficiency, Migraine headache, Morbidly obese ([address] PCOS (polycystic ovarian syndrome), Prolonged emergence from general anesthesia, RLS (restless legs syndrome), and Vitamin D deficiency. Past Surgical History:  has a past surgical history that includes Cholecystectomy (); hysteroscopy (2015); Sleeve Gastrectomy (); Appendectomy ();  section (); Dilation and curettage of uterus (); Tubal ligation (); Upper gastrointestinal endoscopy (N/A, 2020); Colonoscopy; Maty-en-Y Gastric Bypass (N/A, 2020); and Achilles tendon surgery (Right, 2020). Social History:  reports that she quit smoking about 7 years ago. Her smoking use included cigarettes. She started smoking about 29 years ago. She has a 11.00 pack-year smoking history. She has never used smokeless tobacco. She reports that she does not currently use alcohol. She reports that she does not use drugs. Family History: family history includes Arthritis in her father; COPD in her maternal grandmother; Cancer in her maternal grandfather and maternal grandmother; Diabetes in her maternal grandfather and maternal grandmother; Elevated Lipids in her maternal grandmother; Heart Disease in her maternal grandfather; High Blood Pressure in her mother; High Cholesterol in her mother; Hypertension in her maternal grandmother; No Known Problems in her sister. Allergies: Ciprofloxacin    Physical Exam   Vital Signs:  /72 (Site: Right Lower Arm, Position: Sitting, Cuff Size: Medium Adult)   Pulse 74   Temp 97.9 °F (36.6 °C) (Temporal)   Resp 20   Ht 5' 7\" (1.702 m)   Wt 208 lb (94.3 kg)   BMI 32.58 kg/m²    Oxygen Saturation Interpretation: Normal.    General Appearance/Constitutional:  Alert, development consistent with age, NAD. HEENT:  NC/NT. PERRLA. Airway patent.   No erythema to posterior pharynx. Neck:  Supple. No lymphadenopathy. Lungs:  Clear to auscultation and breath sounds equal.  Heart:  Regular rate and rhythm. Abdomen:  General Appearance: No obvious trauma or bruising. Bowel sounds:bs x 4       Distension:  No distension. Tenderness: epigastric tenderness       Liver/Spleen: Non-tender and no hepatosplenomegaly. Pulsatile Mass: None noted. Hernia:  No hernias with Valsalva. Back: CVA Tenderness: No bilateral tenderness or bruising. Skin:  Normal turgor. Warm, dry, without visible rash, unless noted elsewhere. Neurological:  Orientation age-appropriate. Motor functions intact. Lab / Imaging Results   (All laboratory and radiology results have been personally reviewed by myself)  Labs:  No results found for this visit on 11/14/22. Imaging: All Radiology results interpreted by Radiologist unless otherwise noted. No results found. Assessment / Plan   Impression(s):  Alphonse Jerome was seen today for nausea & vomiting and abdominal pain. Diagnoses and all orders for this visit:    Epigastric pain  -     omeprazole (PRILOSEC) 20 MG delayed release capsule; Take 1 capsule by mouth 2 times daily (before meals)  -     sucralfate (CARAFATE) 1 GM tablet; Take 0.5 tablets by mouth 4 times daily    Nausea  -     ondansetron (ZOFRAN-ODT) 4 MG disintegrating tablet; Take 1 tablet by mouth 3 times daily as needed for Nausea or Vomiting      Scripts written for as above, side effects discussed. Increase fluids and rest. Clear liquids progressing to [employer] as tolerated. Advise f/u with PCP in 5-7 days for recheck and further workup as indicated. ED sooner if symptoms worsen or change. ED immediately with the development of fever, shaking chills, body aches, worsening pain, lethargy, CP, or SOB. Pt is in agreement with this care plan. All questions answered. Return if symptoms worsen or fail to improve.     Electronically signed by COLLINS Mueller - CNP   DD: 11/14/22    **This report was transcribed using voice recognition software. Every effort was made to ensure accuracy; however, inadvertent computerized transcription errors may be present.

## 2022-11-14 NOTE — PROGRESS NOTES
Patient is here today with nausea. Patient states that when she eats/drinks anything other than plain water she has a twisting sensation.

## 2022-11-14 NOTE — PATIENT INSTRUCTIONS
ED if symptoms worsen      Please continue to take your vitamin and mineral supplements as instructed. If you received a blood work prescription today for laboratory monitoring due prior to your next routine follow-up visit, please have this blood work obtained 10 to 14 days prior to your next visit. It is important to fast for 12 hours prior to routine weight loss surgery blood work, EXCEPT for drinking water, to ensure accuracy of results. Please report nausea, vomiting, abdominal pain, or any other problems you experience to your surgeon. For problems related to weight loss surgery, it is best to go to [address] [place] [address] Emergency Department and have your surgeon paged.

## 2022-11-18 ENCOUNTER — TELEPHONE (OUTPATIENT)
Dept: BARIATRICS/WEIGHT MGMT | Age: 37
End: 2022-11-18

## 2022-11-18 NOTE — TELEPHONE ENCOUNTER
Spoke with patient who states her pain is worse, she cannot keep anything down and has lost another 2 pounds since Monday. I instructed her to go to the ER for evaluation.

## 2023-02-09 ENCOUNTER — OFFICE VISIT (OUTPATIENT)
Dept: CHIROPRACTIC MEDICINE | Age: 38
End: 2023-02-09

## 2023-02-09 VITALS — BODY MASS INDEX: 32.65 KG/M2 | WEIGHT: 208 LBS | HEIGHT: 67 IN

## 2023-02-09 DIAGNOSIS — M99.01 SEGMENTAL AND SOMATIC DYSFUNCTION OF CERVICAL REGION: ICD-10-CM

## 2023-02-09 DIAGNOSIS — M54.04 PANNICULITIS AFFECTING REGIONS OF NECK AND BACK, THORACIC REGION: ICD-10-CM

## 2023-02-09 DIAGNOSIS — M99.02 SEGMENTAL AND SOMATIC DYSFUNCTION OF THORACIC REGION: ICD-10-CM

## 2023-02-09 DIAGNOSIS — M54.2 CERVICALGIA: Primary | ICD-10-CM

## 2023-02-09 NOTE — PROGRESS NOTES
23  [facility] Covert : 1985 Sex: female  Age: 40 y.o. Chief Complaint   Patient presents with    Back Pain     Patient reports tightness in her back       HPI:   Pain has worsened. On average, pain is perceived as mild (1-3  pain scale). Patient denies new numbness, new weakness, new tingling  More middle back pain today, low back doing well. Current Outpatient Medications:     omeprazole (PRILOSEC) 20 MG delayed release capsule, Take 1 capsule by mouth 2 times daily (before meals), Disp: 60 capsule, Rfl: 0    sucralfate (CARAFATE) 1 GM tablet, Take 0.5 tablets by mouth 4 times daily, Disp: 60 tablet, Rfl: 1    ondansetron (ZOFRAN-ODT) 4 MG disintegrating tablet, Take 1 tablet by mouth 3 times daily as needed for Nausea or Vomiting, Disp: 21 tablet, Rfl: 0    diazePAM (VALIUM) 5 MG tablet, Take 1 tablet by mouth every 8 hours as needed for Anxiety or Sleep for up to 120 days. , Disp: 90 tablet, Rfl: 3    ketorolac (TORADOL) 10 MG tablet, Take 1 tablet by mouth every 8-12 hours as needed for Pain With food, Disp: 20 tablet, Rfl: 0    acetaminophen (TYLENOL) 325 MG tablet, Take 650 mg by mouth every 6 hours as needed for Pain, Disp: , Rfl:     citalopram (CELEXA) 40 MG tablet, Take 1 tablet by mouth daily, Disp: 30 tablet, Rfl: 5    atomoxetine (STRATTERA) 40 MG capsule, Take 1 capsule by mouth daily, Disp: 30 capsule, Rfl: 3    clobetasol (TEMOVATE) 0.05 % ointment, Apply topically 2 times daily. , Disp: 45 g, Rfl: 1    Elagolix Sodium (ORILISSA) 150 MG TABS, Take 150 mg by mouth daily, Disp: 60 tablet, Rfl: 3    azelastine (ASTELIN) 0.1 % nasal spray, 1 spray by Nasal route 2 times daily Use in each nostril as directed, Disp: 60 mL, Rfl: 1    busPIRone (BUSPAR) 5 MG tablet, TAKE ONE TABLET BY MOUTH 3 TIMES A DAY, Disp: 90 tablet, Rfl: 0    fluticasone (FLONASE) 50 MCG/ACT nasal spray, , Disp: , Rfl:     loratadine (CLARITIN) 10 MG tablet, , Disp: , Rfl:     topiramate (TOPAMAX) 25 MG tablet, Start 1 twice daily x 2 wks, then 1 in a.m. and 2 in p.m. x 2 wks, then 2 twice daily if needed (Patient taking differently: Take 25 mg by mouth daily), Disp: 124 tablet, Rfl: 5    vitamin D (ERGOCALCIFEROL) 1.25 MG (78176 UT) CAPS capsule, Take 1 capsule by mouth once a week, Disp: 12 capsule, Rfl: 0    Exam:   There were no vitals filed for this visit. Active trigger points bilateral trapezius. No midline pain. Appears well otherwise. No apparent distress. There are hypertonic and tender fibers noted with palpation in the paraspinal muscles of the cervical, thoracic region. Joint fixation is noted with motion screening at C4-7, C7-T1. There are no diagnoses linked to this encounter. Treatment Plan: Provided vibratory massage to the upper back for 2 minutes. Then diversified manipulation to the affected cervical and thoracic segments. Tolerated well. She noted relief following care.   I will see her back as needed for further treatment      Seen By:  Carlos Eduardo Hough

## 2023-03-25 ENCOUNTER — OFFICE VISIT (OUTPATIENT)
Dept: FAMILY MEDICINE CLINIC | Age: 38
End: 2023-03-25

## 2023-03-25 VITALS
TEMPERATURE: 97.6 F | BODY MASS INDEX: 33.02 KG/M2 | HEART RATE: 77 BPM | HEIGHT: 67 IN | DIASTOLIC BLOOD PRESSURE: 68 MMHG | OXYGEN SATURATION: 99 % | WEIGHT: 210.4 LBS | SYSTOLIC BLOOD PRESSURE: 120 MMHG

## 2023-03-25 DIAGNOSIS — R30.0 DYSURIA: Primary | ICD-10-CM

## 2023-03-25 DIAGNOSIS — N30.00 ACUTE CYSTITIS WITHOUT HEMATURIA: ICD-10-CM

## 2023-03-25 DIAGNOSIS — R30.0 DYSURIA: ICD-10-CM

## 2023-03-25 LAB
BILIRUBIN, POC: ABNORMAL
BLOOD URINE, POC: ABNORMAL
CLARITY, POC: CLEAR
COLOR, POC: YELLOW
GLUCOSE URINE, POC: ABNORMAL
KETONES, POC: ABNORMAL
LEUKOCYTE EST, POC: ABNORMAL
NITRITE, POC: POSITIVE
PH, POC: 6.5
PROTEIN, POC: ABNORMAL
SPECIFIC GRAVITY, POC: 1.02
UROBILINOGEN, POC: 2

## 2023-03-25 RX ORDER — SULFAMETHOXAZOLE AND TRIMETHOPRIM 800; 160 MG/1; MG/1
1 TABLET ORAL 2 TIMES DAILY
Qty: 14 TABLET | Refills: 0 | Status: SHIPPED | OUTPATIENT
Start: 2023-03-25 | End: 2023-04-01

## 2023-03-25 ASSESSMENT — ENCOUNTER SYMPTOMS: BACK PAIN: 1

## 2023-03-25 NOTE — PROGRESS NOTES
OFFICE NOTE    3/25/23  Name: Shon Bradford Covert  :1985   Sex:female   Age:37 y.o. SUBJECTIVE  Chief Complaint   Patient presents with    Urinary Tract Infection     Burning, pressure, frequency       HPI comes in with several days of low back pain, now with added urgency, frequency and dysuria. Review of Systems   Constitutional:  Positive for fatigue. Negative for activity change. Genitourinary:  Positive for dysuria, frequency, pelvic pain and urgency. Negative for decreased urine volume, flank pain and hematuria. Musculoskeletal:  Positive for back pain. Skin:  Negative for pallor and rash. Psychiatric/Behavioral:  The patient is nervous/anxious. All other systems reviewed and are negative. Current Outpatient Medications:     sulfamethoxazole-trimethoprim (BACTRIM DS;SEPTRA DS) 800-160 MG per tablet, Take 1 tablet by mouth 2 times daily for 7 days, Disp: 14 tablet, Rfl: 0    omeprazole (PRILOSEC) 20 MG delayed release capsule, Take 1 capsule by mouth 2 times daily (before meals), Disp: 60 capsule, Rfl: 0    sucralfate (CARAFATE) 1 GM tablet, Take 0.5 tablets by mouth 4 times daily, Disp: 60 tablet, Rfl: 1    ondansetron (ZOFRAN-ODT) 4 MG disintegrating tablet, Take 1 tablet by mouth 3 times daily as needed for Nausea or Vomiting, Disp: 21 tablet, Rfl: 0    ketorolac (TORADOL) 10 MG tablet, Take 1 tablet by mouth every 8-12 hours as needed for Pain With food, Disp: 20 tablet, Rfl: 0    acetaminophen (TYLENOL) 325 MG tablet, Take 650 mg by mouth every 6 hours as needed for Pain, Disp: , Rfl:     citalopram (CELEXA) 40 MG tablet, Take 1 tablet by mouth daily, Disp: 30 tablet, Rfl: 5    atomoxetine (STRATTERA) 40 MG capsule, Take 1 capsule by mouth daily, Disp: 30 capsule, Rfl: 3    clobetasol (TEMOVATE) 0.05 % ointment, Apply topically 2 times daily. , Disp: 45 g, Rfl: 1    Elagolix Sodium (ORILISSA) 150 MG TABS, Take 150 mg by mouth daily, Disp: 60 tablet, Rfl: 3    azelastine

## 2023-03-28 DIAGNOSIS — R10.13 EPIGASTRIC PAIN: ICD-10-CM

## 2023-03-28 DIAGNOSIS — F90.9 ATTENTION DEFICIT HYPERACTIVITY DISORDER (ADHD), UNSPECIFIED ADHD TYPE: ICD-10-CM

## 2023-03-28 LAB
BACTERIA UR CULT: ABNORMAL
ORGANISM: ABNORMAL

## 2023-03-28 RX ORDER — OMEPRAZOLE 20 MG/1
20 CAPSULE, DELAYED RELEASE ORAL
Qty: 60 CAPSULE | Refills: 0 | Status: SHIPPED | OUTPATIENT
Start: 2023-03-28

## 2023-03-29 RX ORDER — CITALOPRAM 40 MG/1
40 TABLET ORAL DAILY
Qty: 30 TABLET | Refills: 5 | Status: SHIPPED | OUTPATIENT
Start: 2023-03-29

## 2023-03-29 RX ORDER — ATOMOXETINE 40 MG/1
40 CAPSULE ORAL DAILY
Qty: 30 CAPSULE | Refills: 3 | Status: SHIPPED | OUTPATIENT
Start: 2023-03-29

## 2023-04-22 NOTE — PROGRESS NOTES
22  [facility] Covert : 1985 Sex: female  Age: 40 y.o. Chief Complaint   Patient presents with    Back Pain       HPI:   Midline Thoracic pain -- ate something bad, gave her dry heaves yesterday. Went to ER, thought she pulled a muscle. Now with LBP too. No leg pains. Neg dejerne's triad. 5/10 pain today. Was 9/10  No meds from ER, but had something while there  She did have to call off work today due to her back pain. Did not feel she was able to be on her feet all day and do the stairs that she needed to      Current Outpatient Medications:     acetaminophen (TYLENOL) 325 MG tablet, Take 650 mg by mouth every 6 hours as needed for Pain, Disp: , Rfl:     citalopram (CELEXA) 40 MG tablet, Take 1 tablet by mouth daily, Disp: 30 tablet, Rfl: 5    atomoxetine (STRATTERA) 40 MG capsule, Take 1 capsule by mouth daily, Disp: 30 capsule, Rfl: 3    ketorolac (TORADOL) 10 MG tablet, Take 1 tablet by mouth every 8-12 hours as needed for Pain With food, Disp: 20 tablet, Rfl: 0    clobetasol (TEMOVATE) 0.05 % ointment, Apply topically 2 times daily. , Disp: 45 g, Rfl: 1    Elagolix Sodium (ORILISSA) 150 MG TABS, Take 150 mg by mouth daily, Disp: 60 tablet, Rfl: 3    azelastine (ASTELIN) 0.1 % nasal spray, 1 spray by Nasal route 2 times daily Use in each nostril as directed, Disp: 60 mL, Rfl: 1    busPIRone (BUSPAR) 5 MG tablet, TAKE ONE TABLET BY MOUTH 3 TIMES A DAY, Disp: 90 tablet, Rfl: 0    fluticasone (FLONASE) 50 MCG/ACT nasal spray, , Disp: , Rfl:     loratadine (CLARITIN) 10 MG tablet, , Disp: , Rfl:     topiramate (TOPAMAX) 25 MG tablet, Start 1 twice daily x 2 wks, then 1 in a.m. and 2 in p.m. x 2 wks, then 2 twice daily if needed (Patient taking differently: Take 25 mg by mouth daily), Disp: 124 tablet, Rfl: 5    vitamin D (ERGOCALCIFEROL) 1.25 MG (58806 UT) CAPS capsule, Take 1 capsule by mouth once a week, Disp: 12 capsule, Rfl: 0    Exam:   Vitals:    22 1040   Pulse: 87   Temp: 98.8 °F (37.1 °C)   SpO2: 96%     SLRs are negative bilaterally in seated position. She stands unassisted. No antalgia or list.  Ambulates without difficulty. Lumbar active range of motion are mildly limited throughout with endrange pain noted. Resisted lumbar motions reproduces mild low back pain. Reflexes are +2 and symmetrical medial hamstring and Achilles. Sensation to light touch WNL to distal lower extremity dermatomes. Posterior tibial pulses are 2/4. There are hypertonic and tender fibers noted with palpation in the paraspinal muscles of the thoracic, lumbar region. Joint fixation is noted with motion screening at L4-5, bilateral SI joints. She does have some tenderness in the midline at L5-S1 interspace. Jenn Potts was seen today for back pain. Diagnoses and all orders for this visit:    Acute midline low back pain without sciatica      Treatment Plan: We will start her in some treatment today similar to previous. Specifically performing Love flexion distraction manipulation at L4, protocol to followed by mechanically assisted manipulation of the SI joints. She will begin supine pelvic tilts, 2 sets of 5-10 to start progressing to supine bridges on Saturday, 2 x 8-20. In the past she is responded well to treatment. I would expect her to do well with this issue, though it may take a week or so. She should do exercises, home-based self-care. See me back if necessary.       Seen By:  Erick Gutierres DC The patient is a 64y Male complaining of back pain general.

## 2023-07-31 DIAGNOSIS — F90.9 ATTENTION DEFICIT HYPERACTIVITY DISORDER (ADHD), UNSPECIFIED ADHD TYPE: ICD-10-CM

## 2023-08-01 RX ORDER — CITALOPRAM 40 MG/1
40 TABLET ORAL DAILY
Qty: 30 TABLET | Refills: 5 | Status: SHIPPED | OUTPATIENT
Start: 2023-08-01

## 2023-08-01 RX ORDER — ATOMOXETINE 40 MG/1
40 CAPSULE ORAL DAILY
Qty: 30 CAPSULE | Refills: 5 | Status: SHIPPED | OUTPATIENT
Start: 2023-08-01

## 2023-09-07 ENCOUNTER — OFFICE VISIT (OUTPATIENT)
Dept: FAMILY MEDICINE CLINIC | Age: 38
End: 2023-09-07

## 2023-09-07 VITALS
WEIGHT: 211 LBS | SYSTOLIC BLOOD PRESSURE: 110 MMHG | TEMPERATURE: 97.8 F | OXYGEN SATURATION: 98 % | HEIGHT: 67 IN | HEART RATE: 76 BPM | DIASTOLIC BLOOD PRESSURE: 62 MMHG | BODY MASS INDEX: 33.12 KG/M2

## 2023-09-07 DIAGNOSIS — L29.9 ITCHING: ICD-10-CM

## 2023-09-07 DIAGNOSIS — J02.9 SORE THROAT: ICD-10-CM

## 2023-09-07 DIAGNOSIS — L30.9 DERMATITIS: Primary | ICD-10-CM

## 2023-09-07 LAB — S PYO AG THROAT QL: NORMAL

## 2023-09-07 PROCEDURE — 99213 OFFICE O/P EST LOW 20 MIN: CPT | Performed by: INTERNAL MEDICINE

## 2023-09-07 PROCEDURE — 87880 STREP A ASSAY W/OPTIC: CPT | Performed by: INTERNAL MEDICINE

## 2023-09-07 PROCEDURE — 96372 THER/PROPH/DIAG INJ SC/IM: CPT | Performed by: INTERNAL MEDICINE

## 2023-09-07 RX ORDER — TRIAMCINOLONE ACETONIDE 40 MG/ML
40 INJECTION, SUSPENSION INTRA-ARTICULAR; INTRAMUSCULAR ONCE
Status: COMPLETED | OUTPATIENT
Start: 2023-09-07 | End: 2023-09-07

## 2023-09-07 RX ORDER — PREDNISONE 10 MG/1
TABLET ORAL
Qty: 12 TABLET | Refills: 0 | Status: SHIPPED | OUTPATIENT
Start: 2023-09-07 | End: 2023-09-13

## 2023-09-07 RX ADMIN — TRIAMCINOLONE ACETONIDE 40 MG: 40 INJECTION, SUSPENSION INTRA-ARTICULAR; INTRAMUSCULAR at 12:03

## 2023-09-07 SDOH — ECONOMIC STABILITY: FOOD INSECURITY: WITHIN THE PAST 12 MONTHS, YOU WORRIED THAT YOUR FOOD WOULD RUN OUT BEFORE YOU GOT MONEY TO BUY MORE.: NEVER TRUE

## 2023-09-07 SDOH — ECONOMIC STABILITY: FOOD INSECURITY: WITHIN THE PAST 12 MONTHS, THE FOOD YOU BOUGHT JUST DIDN'T LAST AND YOU DIDN'T HAVE MONEY TO GET MORE.: NEVER TRUE

## 2023-09-07 SDOH — ECONOMIC STABILITY: HOUSING INSECURITY
IN THE LAST 12 MONTHS, WAS THERE A TIME WHEN YOU DID NOT HAVE A STEADY PLACE TO SLEEP OR SLEPT IN A SHELTER (INCLUDING NOW)?: NO

## 2023-09-07 SDOH — ECONOMIC STABILITY: INCOME INSECURITY: HOW HARD IS IT FOR YOU TO PAY FOR THE VERY BASICS LIKE FOOD, HOUSING, MEDICAL CARE, AND HEATING?: NOT HARD AT ALL

## 2023-09-07 ASSESSMENT — ENCOUNTER SYMPTOMS
RESPIRATORY NEGATIVE: 1
GASTROINTESTINAL NEGATIVE: 1
FACIAL SWELLING: 0
SHORTNESS OF BREATH: 0

## 2023-09-08 NOTE — PROGRESS NOTES
rapid strep which was negative. Follow-up with PCP. Push fluids. Notify us if not improving. Return for fu pcp. Seen By:  Donny Isaac MD      *Document was created using voice recognition software. Note was reviewed however may contain grammatical errors.

## 2023-09-22 ENCOUNTER — TELEPHONE (OUTPATIENT)
Dept: BARIATRICS/WEIGHT MGMT | Age: 38
End: 2023-09-22

## 2023-09-22 ENCOUNTER — OFFICE VISIT (OUTPATIENT)
Dept: BARIATRICS/WEIGHT MGMT | Age: 38
End: 2023-09-22

## 2023-09-22 ENCOUNTER — PREP FOR PROCEDURE (OUTPATIENT)
Dept: BARIATRICS/WEIGHT MGMT | Age: 38
End: 2023-09-22

## 2023-09-22 VITALS
BODY MASS INDEX: 32.02 KG/M2 | TEMPERATURE: 97.8 F | HEIGHT: 67 IN | HEART RATE: 61 BPM | WEIGHT: 204 LBS | RESPIRATION RATE: 20 BRPM | DIASTOLIC BLOOD PRESSURE: 62 MMHG | SYSTOLIC BLOOD PRESSURE: 100 MMHG

## 2023-09-22 DIAGNOSIS — K91.2 MALNUTRITION FOLLOWING GASTROINTESTINAL SURGERY: Primary | ICD-10-CM

## 2023-09-22 DIAGNOSIS — R10.13 EPIGASTRIC PAIN: ICD-10-CM

## 2023-09-22 DIAGNOSIS — R11.0 NAUSEA: ICD-10-CM

## 2023-09-22 PROBLEM — R10.9 ABDOMINAL PAIN: Status: ACTIVE | Noted: 2023-09-22

## 2023-09-22 PROCEDURE — 99214 OFFICE O/P EST MOD 30 MIN: CPT | Performed by: SURGERY

## 2023-09-22 NOTE — TELEPHONE ENCOUNTER
Prior Authorization Form  DEMOGRAPHICS:    Patient Name:  Damon Clayton Covert  Patient :  1985            Insurance:  Payor: / No coverage found. Insurance ID Number:    Payer/Plan Subscr  Sex Relation Sub.  Ins. ID Effective Group Num         DIAGNOSIS & PROCEDURE:    Procedure/Operation: diag lap           CPT Code: 73797    Diagnosis:  abdominal pain    ICD10 Code: r10.9    Location:  [facility]    Surgeon:  Alejandra Madrigal INFORMATION:    Date: 10-24-23    Time: unknown              Anesthesia:  General                                                       Status:  Outpatient        Special Comments:  none       Electronically signed by Brad Gibson RN on 2023 at 10:11 AM

## 2023-09-22 NOTE — PROGRESS NOTES
is 3 yrs Post Maty-en- Y Gastric Bypass with acute onset of abdominal pain a couple weeks ago that seems to be relapsing remitting. She does not complain of GERD,  does not have sleep apnea,  does not have diabetes,  does not have hypertension off medical treatment. Malnutrition following gastrointestinal surgery: Continue to eat a high protein, low calorie diet, eat small portions very slowly and chew well before swallowing. Drink plenty of water and fluids. Maintain vitamin supplementation as directed by dietary nutritional counseling  Excessive skin secondary to malnutrition and rapid weight loss: Keep skin folds clean and dry, nystatin cream and powder as needed for areas of persistent rash  Constipation: Maintain 2 to 6 g of fiber supplementation daily, 64 ounces of water daily  Dietary noncompliance: Maintain 80 to 100 g of protein intake daily, continue to keep food log to record daily protein in caloric intake. Meal prep in order to maintain strict dietary control of caloric intake. Avoid taking meals and distracting environment such as in front of the television or at restaurants. Spread meals out over 15 to 30 minutes to ensure adequate chewing and swallowing and limit overeating  Acute onset abdominal pain, CT examination with question of afferent limb syndrome, mesenteric congestion. Hiatal hernia evident on CT scan. I discussed with her diagnostic laparoscopy to evaluate for mesenteric volvulus or internal hernia. I discussed with her hiatal hernia would require upper endoscopy and evaluation for further work-up however at this time her reflux does not seem to be at significant.-She understands and agrees we will plan to proceed with a diagnostic laparoscopy evaluation internal hernia mesenteric volvulus.       Follow-up after surgery 2 weeks    Physician Signature: Electronically signed by Dr. David Kim MD

## 2023-09-22 NOTE — PATIENT INSTRUCTIONS
Please continue to take your vitamin and mineral supplements as instructed. If you received a blood work prescription today for laboratory monitoring due prior to your next routine follow-up visit, please have this blood work obtained 10 to 14 days prior to your next visit. It is important to fast for 12 hours prior to routine weight loss surgery blood work, EXCEPT for drinking water, to ensure accuracy of results. Please report nausea, vomiting, abdominal pain, or any other problems you experience to your surgeon. For problems related to weight loss surgery, it is best to go to [facility] Emergency Department and have your surgeon paged.

## 2023-10-23 NOTE — PROGRESS NOTES
[address] [facility]                                                                                                                    PRE OP INSTRUCTIONS FOR  Cheryl CLARK Randall Covert        Date: 10/23/2023    Date of surgery: 10/24/23   Arrival Time: Hospital will call you between 5pm and 7pm tonight with your final arrival time for surgery    Nothing by mouth (NPO) as instructed. _nothing to eat or drink after midnight___________________________________________________________________    Take the following medications with a small sip of water on the morning of Surgery: celexa, prilosec, if needed allergy med, zofran, busparone     Diabetics may take evening dose of insulin but none after midnight. If you feel symptomatic or low blood sugar morning of surgery drink 1-2 ounces of apple juice only. Aspirin, Ibuprofen, Advil, Naproxen, Vitamin E and other Anti-inflammatory products should be stopped  before surgery  as directed by your physician. Take Tylenol only unless instructed otherwise by your surgeon. Check with your Doctor regarding stopping Plavix, Coumadin, Lovenox, Eliquis, Effient, or other blood thinners. Do not smoke,use illicit drugs and do not drink any alcoholic beverages 24 hours prior to surgery. You may brush your teeth the morning of surgery. DO NOT SWALLOW WATER    You MUST make arrangements for a responsible adult to take you home after your surgery. You will not be allowed to leave alone or drive yourself home. It is strongly suggested someone stay with you the first 24 hrs. Your surgery will be cancelled if you do not have a ride home. PEDIATRIC PATIENTS ONLY:  A parent/legal guardian must accompany a child scheduled for surgery and plan to stay at the hospital until the child is discharged. Please do not bring other children with you.     Please wear simple, loose fitting clothing to the hospital.  Do not bring valuables (money, credit cards, checkbooks,

## 2023-10-24 ENCOUNTER — ANESTHESIA (OUTPATIENT)
Dept: OPERATING ROOM | Age: 38
End: 2023-10-24

## 2023-10-24 ENCOUNTER — HOSPITAL ENCOUNTER (OUTPATIENT)
Age: 38
Setting detail: OUTPATIENT SURGERY
Discharge: HOME | End: 2023-10-24
Attending: SURGERY | Admitting: SURGERY

## 2023-10-24 ENCOUNTER — ANESTHESIA EVENT (OUTPATIENT)
Dept: OPERATING ROOM | Age: 38
End: 2023-10-24

## 2023-10-24 VITALS
HEART RATE: 73 BPM | SYSTOLIC BLOOD PRESSURE: 105 MMHG | BODY MASS INDEX: 32.49 KG/M2 | OXYGEN SATURATION: 99 % | WEIGHT: 207 LBS | DIASTOLIC BLOOD PRESSURE: 55 MMHG | HEIGHT: 67 IN | TEMPERATURE: 97.8 F | RESPIRATION RATE: 16 BRPM

## 2023-10-24 DIAGNOSIS — G89.18 POSTOPERATIVE PAIN: Primary | ICD-10-CM

## 2023-10-24 LAB
ANION GAP SERPL CALCULATED.3IONS-SCNC: 6 MMOL/L (ref 7–16)
BASOPHILS # BLD: 0.01 K/UL (ref 0–0.2)
BASOPHILS NFR BLD: 0 % (ref 0–2)
BUN SERPL-MCNC: 18 MG/DL (ref 6–20)
CALCIUM SERPL-MCNC: 8.7 MG/DL (ref 8.6–10.2)
CHLORIDE SERPL-SCNC: 102 MMOL/L (ref 98–107)
CO2 SERPL-SCNC: 31 MMOL/L (ref 22–29)
CREAT SERPL-MCNC: 0.7 MG/DL (ref 0.5–1)
EOSINOPHIL # BLD: 0.06 K/UL (ref 0.05–0.5)
EOSINOPHILS RELATIVE PERCENT: 1 % (ref 0–6)
ERYTHROCYTE [DISTWIDTH] IN BLOOD BY AUTOMATED COUNT: 14.6 % (ref 11.5–15)
GFR SERPL CREATININE-BSD FRML MDRD: >60 ML/MIN/1.73M2
GLUCOSE SERPL-MCNC: 90 MG/DL (ref 74–99)
HCG, URINE, POC: NEGATIVE
HCT VFR BLD AUTO: 38.5 % (ref 34–48)
HGB BLD-MCNC: 12.3 G/DL (ref 11.5–15.5)
IMM GRANULOCYTES # BLD AUTO: <0.03 K/UL (ref 0–0.58)
IMM GRANULOCYTES NFR BLD: 0 % (ref 0–5)
LYMPHOCYTES NFR BLD: 1.73 K/UL (ref 1.5–4)
LYMPHOCYTES RELATIVE PERCENT: 27 % (ref 20–42)
Lab: NORMAL
MCH RBC QN AUTO: 27.8 PG (ref 26–35)
MCHC RBC AUTO-ENTMCNC: 31.9 G/DL (ref 32–34.5)
MCV RBC AUTO: 86.9 FL (ref 80–99.9)
MONOCYTES NFR BLD: 0.4 K/UL (ref 0.1–0.95)
MONOCYTES NFR BLD: 6 % (ref 2–12)
NEGATIVE QC PASS/FAIL: NORMAL
NEUTROPHILS NFR BLD: 66 % (ref 43–80)
NEUTS SEG NFR BLD: 4.28 K/UL (ref 1.8–7.3)
PLATELET # BLD AUTO: 288 K/UL (ref 130–450)
PMV BLD AUTO: 10 FL (ref 7–12)
POSITIVE QC PASS/FAIL: NORMAL
POTASSIUM SERPL-SCNC: 3.6 MMOL/L (ref 3.5–5)
RBC # BLD AUTO: 4.43 M/UL (ref 3.5–5.5)
SODIUM SERPL-SCNC: 139 MMOL/L (ref 132–146)
WBC OTHER # BLD: 6.5 K/UL (ref 4.5–11.5)

## 2023-10-24 PROCEDURE — 3600000004 HC SURGERY LEVEL 4 BASE: Performed by: SURGERY

## 2023-10-24 PROCEDURE — 3700000001 HC ADD 15 MINUTES (ANESTHESIA): Performed by: SURGERY

## 2023-10-24 PROCEDURE — 7100000010 HC PHASE II RECOVERY - FIRST 15 MIN: Performed by: SURGERY

## 2023-10-24 PROCEDURE — 2580000003 HC RX 258: Performed by: SURGERY

## 2023-10-24 PROCEDURE — 2709999900 HC NON-CHARGEABLE SUPPLY: Performed by: SURGERY

## 2023-10-24 PROCEDURE — 6360000002 HC RX W HCPCS: Performed by: SURGERY

## 2023-10-24 PROCEDURE — 2580000003 HC RX 258

## 2023-10-24 PROCEDURE — 2500000003 HC RX 250 WO HCPCS

## 2023-10-24 PROCEDURE — 7100000000 HC PACU RECOVERY - FIRST 15 MIN: Performed by: SURGERY

## 2023-10-24 PROCEDURE — 7100000011 HC PHASE II RECOVERY - ADDTL 15 MIN: Performed by: SURGERY

## 2023-10-24 PROCEDURE — 3700000000 HC ANESTHESIA ATTENDED CARE: Performed by: SURGERY

## 2023-10-24 PROCEDURE — 2720000010 HC SURG SUPPLY STERILE: Performed by: SURGERY

## 2023-10-24 PROCEDURE — 6360000002 HC RX W HCPCS: Performed by: ANESTHESIOLOGY

## 2023-10-24 PROCEDURE — 3600000014 HC SURGERY LEVEL 4 ADDTL 15MIN: Performed by: SURGERY

## 2023-10-24 PROCEDURE — 2500000003 HC RX 250 WO HCPCS: Performed by: SURGERY

## 2023-10-24 PROCEDURE — 44238 UNLISTED LAPS PX INTESTINE: CPT | Performed by: SURGERY

## 2023-10-24 PROCEDURE — 80048 BASIC METABOLIC PNL TOTAL CA: CPT

## 2023-10-24 PROCEDURE — 85025 COMPLETE CBC W/AUTO DIFF WBC: CPT

## 2023-10-24 PROCEDURE — 6360000002 HC RX W HCPCS

## 2023-10-24 PROCEDURE — 7100000001 HC PACU RECOVERY - ADDTL 15 MIN: Performed by: SURGERY

## 2023-10-24 RX ORDER — IPRATROPIUM BROMIDE AND ALBUTEROL SULFATE 2.5; .5 MG/3ML; MG/3ML
1 SOLUTION RESPIRATORY (INHALATION)
Status: DISCONTINUED | OUTPATIENT
Start: 2023-10-24 | End: 2023-10-24 | Stop reason: HOSPADM

## 2023-10-24 RX ORDER — FENTANYL CITRATE 50 UG/ML
INJECTION, SOLUTION INTRAMUSCULAR; INTRAVENOUS
Status: COMPLETED
Start: 2023-10-24 | End: 2023-10-24

## 2023-10-24 RX ORDER — SODIUM CHLORIDE 9 MG/ML
INJECTION, SOLUTION INTRAVENOUS PRN
Status: DISCONTINUED | OUTPATIENT
Start: 2023-10-24 | End: 2023-10-24 | Stop reason: HOSPADM

## 2023-10-24 RX ORDER — HYDRALAZINE HYDROCHLORIDE 20 MG/ML
10 INJECTION INTRAMUSCULAR; INTRAVENOUS
Status: DISCONTINUED | OUTPATIENT
Start: 2023-10-24 | End: 2023-10-24 | Stop reason: HOSPADM

## 2023-10-24 RX ORDER — SODIUM CHLORIDE 0.9 % (FLUSH) 0.9 %
5-40 SYRINGE (ML) INJECTION EVERY 12 HOURS SCHEDULED
Status: DISCONTINUED | OUTPATIENT
Start: 2023-10-24 | End: 2023-10-24 | Stop reason: HOSPADM

## 2023-10-24 RX ORDER — KETOROLAC TROMETHAMINE 30 MG/ML
30 INJECTION, SOLUTION INTRAMUSCULAR; INTRAVENOUS ONCE
Status: COMPLETED | OUTPATIENT
Start: 2023-10-24 | End: 2023-10-24

## 2023-10-24 RX ORDER — SODIUM CHLORIDE 0.9 % (FLUSH) 0.9 %
5-40 SYRINGE (ML) INJECTION PRN
Status: DISCONTINUED | OUTPATIENT
Start: 2023-10-24 | End: 2023-10-24 | Stop reason: HOSPADM

## 2023-10-24 RX ORDER — KETOROLAC TROMETHAMINE 30 MG/ML
30 INJECTION, SOLUTION INTRAMUSCULAR; INTRAVENOUS
Status: DISCONTINUED | OUTPATIENT
Start: 2023-10-24 | End: 2023-10-24 | Stop reason: HOSPADM

## 2023-10-24 RX ORDER — TRAMADOL HYDROCHLORIDE 50 MG/1
50 TABLET ORAL EVERY 4 HOURS PRN
Qty: 18 TABLET | Refills: 0 | Status: SHIPPED | OUTPATIENT
Start: 2023-10-24 | End: 2023-10-27

## 2023-10-24 RX ORDER — MEPERIDINE HYDROCHLORIDE 25 MG/ML
12.5 INJECTION INTRAMUSCULAR; INTRAVENOUS; SUBCUTANEOUS EVERY 5 MIN PRN
Status: DISCONTINUED | OUTPATIENT
Start: 2023-10-24 | End: 2023-10-24 | Stop reason: HOSPADM

## 2023-10-24 RX ORDER — MIDAZOLAM HYDROCHLORIDE 1 MG/ML
INJECTION INTRAMUSCULAR; INTRAVENOUS PRN
Status: DISCONTINUED | OUTPATIENT
Start: 2023-10-24 | End: 2023-10-24 | Stop reason: SDUPTHER

## 2023-10-24 RX ORDER — SODIUM CHLORIDE 9 MG/ML
INJECTION, SOLUTION INTRAVENOUS CONTINUOUS
Status: DISCONTINUED | OUTPATIENT
Start: 2023-10-24 | End: 2023-10-24 | Stop reason: HOSPADM

## 2023-10-24 RX ORDER — DIPHENHYDRAMINE HYDROCHLORIDE 50 MG/ML
12.5 INJECTION INTRAMUSCULAR; INTRAVENOUS
Status: DISCONTINUED | OUTPATIENT
Start: 2023-10-24 | End: 2023-10-24 | Stop reason: HOSPADM

## 2023-10-24 RX ORDER — EPHEDRINE SULFATE/0.9% NACL/PF 50 MG/5 ML
SYRINGE (ML) INTRAVENOUS PRN
Status: DISCONTINUED | OUTPATIENT
Start: 2023-10-24 | End: 2023-10-24 | Stop reason: SDUPTHER

## 2023-10-24 RX ORDER — MIDAZOLAM HYDROCHLORIDE 1 MG/ML
2 INJECTION INTRAMUSCULAR; INTRAVENOUS
Status: DISCONTINUED | OUTPATIENT
Start: 2023-10-24 | End: 2023-10-24 | Stop reason: HOSPADM

## 2023-10-24 RX ORDER — MORPHINE SULFATE 2 MG/ML
2 INJECTION, SOLUTION INTRAMUSCULAR; INTRAVENOUS EVERY 5 MIN PRN
Status: DISCONTINUED | OUTPATIENT
Start: 2023-10-24 | End: 2023-10-24 | Stop reason: HOSPADM

## 2023-10-24 RX ORDER — ONDANSETRON 2 MG/ML
INJECTION INTRAMUSCULAR; INTRAVENOUS PRN
Status: DISCONTINUED | OUTPATIENT
Start: 2023-10-24 | End: 2023-10-24 | Stop reason: SDUPTHER

## 2023-10-24 RX ORDER — METHOCARBAMOL 100 MG/ML
1000 INJECTION, SOLUTION INTRAMUSCULAR; INTRAVENOUS ONCE
Status: COMPLETED | OUTPATIENT
Start: 2023-10-24 | End: 2023-10-24

## 2023-10-24 RX ORDER — PROCHLORPERAZINE EDISYLATE 5 MG/ML
5 INJECTION INTRAMUSCULAR; INTRAVENOUS
Status: DISCONTINUED | OUTPATIENT
Start: 2023-10-24 | End: 2023-10-24 | Stop reason: HOSPADM

## 2023-10-24 RX ORDER — FENTANYL CITRATE 50 UG/ML
INJECTION, SOLUTION INTRAMUSCULAR; INTRAVENOUS PRN
Status: DISCONTINUED | OUTPATIENT
Start: 2023-10-24 | End: 2023-10-24 | Stop reason: SDUPTHER

## 2023-10-24 RX ORDER — LIDOCAINE HYDROCHLORIDE 20 MG/ML
INJECTION, SOLUTION INTRAVENOUS PRN
Status: DISCONTINUED | OUTPATIENT
Start: 2023-10-24 | End: 2023-10-24 | Stop reason: SDUPTHER

## 2023-10-24 RX ORDER — DEXAMETHASONE SODIUM PHOSPHATE 10 MG/ML
INJECTION, SOLUTION INTRAMUSCULAR; INTRAVENOUS PRN
Status: DISCONTINUED | OUTPATIENT
Start: 2023-10-24 | End: 2023-10-24 | Stop reason: SDUPTHER

## 2023-10-24 RX ORDER — MORPHINE SULFATE 2 MG/ML
INJECTION, SOLUTION INTRAMUSCULAR; INTRAVENOUS
Status: COMPLETED
Start: 2023-10-24 | End: 2023-10-24

## 2023-10-24 RX ORDER — FENTANYL CITRATE 0.05 MG/ML
25 INJECTION, SOLUTION INTRAMUSCULAR; INTRAVENOUS EVERY 5 MIN PRN
Status: DISCONTINUED | OUTPATIENT
Start: 2023-10-24 | End: 2023-10-24 | Stop reason: HOSPADM

## 2023-10-24 RX ORDER — GLYCOPYRROLATE 0.2 MG/ML
INJECTION INTRAMUSCULAR; INTRAVENOUS PRN
Status: DISCONTINUED | OUTPATIENT
Start: 2023-10-24 | End: 2023-10-24 | Stop reason: SDUPTHER

## 2023-10-24 RX ORDER — LABETALOL HYDROCHLORIDE 5 MG/ML
10 INJECTION, SOLUTION INTRAVENOUS
Status: DISCONTINUED | OUTPATIENT
Start: 2023-10-24 | End: 2023-10-24 | Stop reason: HOSPADM

## 2023-10-24 RX ORDER — METHOCARBAMOL 100 MG/ML
1000 INJECTION, SOLUTION INTRAMUSCULAR; INTRAVENOUS ONCE
Status: DISCONTINUED | OUTPATIENT
Start: 2023-10-24 | End: 2023-10-24 | Stop reason: HOSPADM

## 2023-10-24 RX ORDER — ROCURONIUM BROMIDE 10 MG/ML
INJECTION, SOLUTION INTRAVENOUS PRN
Status: DISCONTINUED | OUTPATIENT
Start: 2023-10-24 | End: 2023-10-24 | Stop reason: SDUPTHER

## 2023-10-24 RX ORDER — SODIUM CHLORIDE 9 MG/ML
INJECTION, SOLUTION INTRAVENOUS CONTINUOUS PRN
Status: DISCONTINUED | OUTPATIENT
Start: 2023-10-24 | End: 2023-10-24 | Stop reason: SDUPTHER

## 2023-10-24 RX ORDER — ONDANSETRON 2 MG/ML
4 INJECTION INTRAMUSCULAR; INTRAVENOUS
Status: COMPLETED | OUTPATIENT
Start: 2023-10-24 | End: 2023-10-24

## 2023-10-24 RX ORDER — PROPOFOL 10 MG/ML
INJECTION, EMULSION INTRAVENOUS PRN
Status: DISCONTINUED | OUTPATIENT
Start: 2023-10-24 | End: 2023-10-24 | Stop reason: SDUPTHER

## 2023-10-24 RX ADMIN — SODIUM CHLORIDE: 9 INJECTION, SOLUTION INTRAVENOUS at 07:42

## 2023-10-24 RX ADMIN — KETOROLAC TROMETHAMINE 30 MG: 30 INJECTION, SOLUTION INTRAMUSCULAR; INTRAVENOUS at 10:27

## 2023-10-24 RX ADMIN — METHOCARBAMOL 1000 MG: 100 INJECTION INTRAMUSCULAR; INTRAVENOUS at 10:32

## 2023-10-24 RX ADMIN — CEFAZOLIN 2000 MG: 2 INJECTION, POWDER, FOR SOLUTION INTRAMUSCULAR; INTRAVENOUS at 09:33

## 2023-10-24 RX ADMIN — FENTANYL CITRATE 50 MCG: 50 INJECTION, SOLUTION INTRAMUSCULAR; INTRAVENOUS at 10:06

## 2023-10-24 RX ADMIN — SODIUM CHLORIDE: 900 INJECTION, SOLUTION INTRAVENOUS at 09:47

## 2023-10-24 RX ADMIN — Medication 20 MG: at 09:49

## 2023-10-24 RX ADMIN — Medication 10 MG: at 09:48

## 2023-10-24 RX ADMIN — FENTANYL CITRATE 100 MCG: 50 INJECTION, SOLUTION INTRAMUSCULAR; INTRAVENOUS at 09:26

## 2023-10-24 RX ADMIN — DEXAMETHASONE SODIUM PHOSPHATE 10 MG: 10 INJECTION, SOLUTION INTRAMUSCULAR; INTRAVENOUS at 09:33

## 2023-10-24 RX ADMIN — ONDANSETRON 4 MG: 2 INJECTION INTRAMUSCULAR; INTRAVENOUS at 10:02

## 2023-10-24 RX ADMIN — GLYCOPYRROLATE 0.2 MG: 0.2 INJECTION, SOLUTION INTRAMUSCULAR; INTRAVENOUS at 09:46

## 2023-10-24 RX ADMIN — MEPERIDINE HYDROCHLORIDE 12.5 MG: 25 INJECTION INTRAMUSCULAR; INTRAVENOUS; SUBCUTANEOUS at 10:43

## 2023-10-24 RX ADMIN — SODIUM CHLORIDE: 900 INJECTION, SOLUTION INTRAVENOUS at 09:21

## 2023-10-24 RX ADMIN — MORPHINE SULFATE 2 MG: 2 INJECTION, SOLUTION INTRAMUSCULAR; INTRAVENOUS at 10:20

## 2023-10-24 RX ADMIN — MIDAZOLAM 2 MG: 1 INJECTION INTRAMUSCULAR; INTRAVENOUS at 09:21

## 2023-10-24 RX ADMIN — FENTANYL CITRATE 25 MCG: 0.05 INJECTION, SOLUTION INTRAMUSCULAR; INTRAVENOUS at 11:03

## 2023-10-24 RX ADMIN — FENTANYL CITRATE 25 MCG: 50 INJECTION INTRAMUSCULAR; INTRAVENOUS at 11:03

## 2023-10-24 RX ADMIN — ONDANSETRON 4 MG: 2 INJECTION INTRAMUSCULAR; INTRAVENOUS at 10:36

## 2023-10-24 RX ADMIN — ROCURONIUM BROMIDE 40 MG: 100 INJECTION INTRAVENOUS at 09:26

## 2023-10-24 RX ADMIN — LIDOCAINE HYDROCHLORIDE 60 MG: 20 INJECTION, SOLUTION INTRAVENOUS at 09:26

## 2023-10-24 RX ADMIN — PROPOFOL 170 MG: 10 INJECTION, EMULSION INTRAVENOUS at 09:26

## 2023-10-24 ASSESSMENT — PAIN SCALES - GENERAL
PAINLEVEL_OUTOF10: 2
PAINLEVEL_OUTOF10: 4
PAINLEVEL_OUTOF10: 9
PAINLEVEL_OUTOF10: 8
PAINLEVEL_OUTOF10: 4
PAINLEVEL_OUTOF10: 1
PAINLEVEL_OUTOF10: 8

## 2023-10-24 ASSESSMENT — PAIN DESCRIPTION - DESCRIPTORS
DESCRIPTORS: SORE
DESCRIPTORS: DISCOMFORT
DESCRIPTORS: SORE
DESCRIPTORS: SORE
DESCRIPTORS: DISCOMFORT

## 2023-10-24 ASSESSMENT — PAIN DESCRIPTION - FREQUENCY
FREQUENCY: CONTINUOUS

## 2023-10-24 ASSESSMENT — PAIN - FUNCTIONAL ASSESSMENT
PAIN_FUNCTIONAL_ASSESSMENT: ACTIVITIES ARE NOT PREVENTED
PAIN_FUNCTIONAL_ASSESSMENT: ACTIVITIES ARE NOT PREVENTED
PAIN_FUNCTIONAL_ASSESSMENT: 0-10
PAIN_FUNCTIONAL_ASSESSMENT: ACTIVITIES ARE NOT PREVENTED
PAIN_FUNCTIONAL_ASSESSMENT: ACTIVITIES ARE NOT PREVENTED

## 2023-10-24 ASSESSMENT — PAIN DESCRIPTION - LOCATION
LOCATION: ABDOMEN

## 2023-10-24 ASSESSMENT — PAIN DESCRIPTION - ONSET
ONSET: ON-GOING

## 2023-10-24 ASSESSMENT — PAIN DESCRIPTION - ORIENTATION
ORIENTATION: MID

## 2023-10-24 ASSESSMENT — PAIN DESCRIPTION - PAIN TYPE
TYPE: SURGICAL PAIN

## 2023-10-24 NOTE — H&P
Andrea Han Randall Covert  10/24/2023  [address]    Maty-en- Y Gastric Bypass  3 Year Post-Operative Follow-up     Joni Mack Covert is a 45 y.o. female who is 3 years post Laparoscopic Maty-en-Y Gastric Bypass surgery. Reports epigastric pain. She is not having swallowing difficulty, is noncompliant some of the time with the multivitamins and calcium + Vit D. She is meeting fluid recommendations of at least 64 ounces per day and is meeting protein recommendations. She  is exercising: no regular exercise. Weight=204 lb (92.5 kg)  Today's weight represents a total weight loss of 82 pounds since surgery with 5 pounds regained since the lowest weight. She is complaining of several weeks of vague abdominal pain. She had a most recent episode that was a couple weeks ago when she actually was seen in the emergency department in [place] and had a CAT scan. She had elevated white blood cell count at that time of 11.8 and her CT scan was read as afferent limb syndrome. She brought a disc of her CT examination which I reviewed with her at length in the office. There is no obvious signs of internal hernia or volvulus but there is mesenteric congestion suggestive of possible association. She also has a hiatal hernia that is evident with part of her gastric pouch slipped up above the diaphragm and visible staple line. She denies any pain immediately when eating but states she has not discomfort within half hour of eating. Denies shortness of breath or chest pain. She has been staying on her antacid and taking twice daily. She is maintaining her protein intake and denies any carbohydrate intake that was associated with the pain. Prior to Admission medications    Medication Sig Start Date End Date Taking?  Authorizing Provider   citalopram (CELEXA) 40 MG tablet Take 1 tablet by mouth daily 8/1/23   Clovis Acosta,    atomoxetine (STRATTERA) 40 MG capsule Take 1 capsule by water daily  Dietary noncompliance: Maintain 80 to 100 g of protein intake daily, continue to keep food log to record daily protein in caloric intake. Meal prep in order to maintain strict dietary control of caloric intake. Avoid taking meals and distracting environment such as in front of the television or at restaurants. Spread meals out over 15 to 30 minutes to ensure adequate chewing and swallowing and limit overeating  Acute onset abdominal pain, CT examination with question of afferent limb syndrome, mesenteric congestion. Hiatal hernia evident on CT scan. I discussed with her diagnostic laparoscopy to evaluate for mesenteric volvulus or internal hernia. I discussed with her hiatal hernia would require upper endoscopy and evaluation for further work-up however at this time her reflux does not seem to be at significant.-She understands and agrees we will plan to proceed with a diagnostic laparoscopy evaluation internal hernia mesenteric volvulus.       Follow-up after surgery 2 weeks    Physician Signature: Electronically signed by Dr. Jessica Barrett MD

## 2023-10-24 NOTE — ANESTHESIA POSTPROCEDURE EVALUATION
Department of Anesthesiology  Postprocedure Note    Patient: Joi Sauer Covert  MRN: [id number]  YOB: 1985  Date of evaluation: 10/24/2023      Procedure Summary     Date: 10/24/23 Room / Location: [facility] 06 / [address]    Anesthesia Start: 4354 Anesthesia Stop: 6420    Procedure: DIAGNOSTIC LAPAROSCOPY  CLOSURE  internal hernia mesenteric volvulus.  (Abdomen) Diagnosis:       Abdominal pain      (Abdominal pain [R10.9])    Surgeons: Tisha Adamson MD Responsible Provider: Judie Michel MD    Anesthesia Type: General ASA Status: 2          Anesthesia Type: General    Nieves Phase I: Nieves Score: 10    Nieves Phase II:        Anesthesia Post Evaluation    Patient location during evaluation: PACU  Patient participation: complete - patient participated  Level of consciousness: awake  Airway patency: patent  Nausea & Vomiting: no nausea and no vomiting  Complications: no  Cardiovascular status: hemodynamically stable  Respiratory status: acceptable  Hydration status: euvolemic  Pain management: adequate

## 2023-11-03 ENCOUNTER — OFFICE VISIT (OUTPATIENT)
Dept: BARIATRICS/WEIGHT MGMT | Age: 38
End: 2023-11-03

## 2023-11-03 VITALS
BODY MASS INDEX: 32.49 KG/M2 | RESPIRATION RATE: 20 BRPM | SYSTOLIC BLOOD PRESSURE: 100 MMHG | TEMPERATURE: 98 F | HEIGHT: 67 IN | WEIGHT: 207 LBS | DIASTOLIC BLOOD PRESSURE: 55 MMHG | HEART RATE: 73 BPM

## 2023-11-03 DIAGNOSIS — K91.2 MALNUTRITION FOLLOWING GASTROINTESTINAL SURGERY: Primary | ICD-10-CM

## 2023-11-03 PROCEDURE — 99024 POSTOP FOLLOW-UP VISIT: CPT | Performed by: SURGERY

## 2023-11-03 RX ORDER — ONDANSETRON 4 MG/1
4 TABLET, ORALLY DISINTEGRATING ORAL EVERY 8 HOURS PRN
Qty: 20 TABLET | Refills: 1 | Status: SHIPPED | OUTPATIENT
Start: 2023-11-03

## 2023-11-03 NOTE — PATIENT INSTRUCTIONS
Please continue to take your vitamin and mineral supplements as instructed. If you received a blood work prescription today for laboratory monitoring due prior to your next routine follow-up visit, please have this blood work obtained 10 to 14 days prior to your next visit. It is important to fast for 12 hours prior to routine weight loss surgery blood work, EXCEPT for drinking water, to ensure accuracy of results. Please report nausea, vomiting, abdominal pain, or any other problems you experience to your surgeon. For problems related to weight loss surgery, it is best to go to [facility] Emergency Department and have your surgeon paged.     Last Surgical Weight Loss:      11/3/2023     8:17 AM   Surgical Weight Loss Tracker   Consult Date 1/10/2020   Initial Height 5' 7\"   Initial Weight 290 lb   Initial BMI 45.42   Ideal Body Weight 163 lb   Surgery Date 6/30/2020   Pre-Surgical Height 5' 7\"   Pre-Surgical Weight 286 lb   Pre Surgery BMI 44.79   Weight to Lose 123 lb   Date 11/3/2023   Height 5' 7\"   Weight 207 lb   BMI 32.42   Weight Change -79 lb   Total Weight Change -79 lb   % EBWL 64%

## 2023-11-03 NOTE — PROGRESS NOTES
system review was performed and are otherwise negative unless mentioned in the above HPI. Specific negatives are listed below but may not include all those reviewed. General ROS: negative obtundation, AMS  ENT ROS: negative rhinorrhea, epistaxis  Allergy and Immunology ROS: negative itchy/watery eyes or nasal congestion  Hematological and Lymphatic ROS: negative spontaneous bleeding or bruising  Endocrine ROS: negative  lethargy, mood swings, palpitations or polydipsia/polyuria  Respiratory ROS: negative sputum changes, stridor, tachypnea or wheezing  Cardiovascular ROS: negative for - loss of consciousness, murmur or orthopnea  Gastrointestinal ROS: negative for - hematochezia or hematemesis  Genito-Urinary ROS: negative for -  genital discharge or hematuria  Musculoskeletal ROS: negative for - focal weakness, gangrene  Psych/Neuro ROS: negative for - visual or auditory hallucinations, suicidal ideation      Time spent reviewing past medical, surgical, social and family history, vitals, nursing assessment and images.         Assessment/Plan:  Sylvester Ibarra Covert is a 45 y.o. female 2 weeks s/p dx lap closure internal hernia    Resume activity gradually   Follow up as needed  No heavy lifting more than 20lbs for 4 weeks total      Physician Signature: Electronically signed by Dr. Nathaniel Ball  [phone] (p)  11/3/2023  8:43 AM

## 2024-01-02 ENCOUNTER — OFFICE VISIT (OUTPATIENT)
Dept: CHIROPRACTIC MEDICINE | Age: 39
End: 2024-01-02

## 2024-01-02 VITALS
OXYGEN SATURATION: 98 % | SYSTOLIC BLOOD PRESSURE: 122 MMHG | WEIGHT: 207 LBS | DIASTOLIC BLOOD PRESSURE: 72 MMHG | HEIGHT: 67 IN | BODY MASS INDEX: 32.49 KG/M2 | TEMPERATURE: 97.4 F | HEART RATE: 79 BPM

## 2024-01-02 DIAGNOSIS — M54.2 CERVICALGIA: ICD-10-CM

## 2024-01-02 DIAGNOSIS — M99.01 SEGMENTAL AND SOMATIC DYSFUNCTION OF CERVICAL REGION: Primary | ICD-10-CM

## 2024-01-02 DIAGNOSIS — M99.02 SEGMENTAL AND SOMATIC DYSFUNCTION OF THORACIC REGION: ICD-10-CM

## 2024-01-02 DIAGNOSIS — M54.04 PANNICULITIS AFFECTING REGIONS OF NECK AND BACK, THORACIC REGION: ICD-10-CM

## 2024-01-02 PROCEDURE — 99999 PR OFFICE/OUTPT VISIT,PROCEDURE ONLY: CPT | Performed by: CHIROPRACTOR

## 2024-01-02 PROCEDURE — 98940 CHIROPRACT MANJ 1-2 REGIONS: CPT | Performed by: CHIROPRACTOR

## 2024-01-02 NOTE — PROGRESS NOTES
Patient is here for neck and upper back adjustment. No injury. Clovis Acosta DO  Electronically signed by Kenzie Nguyễn LPN on 1/2/2024 at 8:38 AM

## 2024-01-02 NOTE — PROGRESS NOTES
24  Zehra CLARK Randall Covert : 1985 Sex: female  Age: 38 y.o.    Chief Complaint   Patient presents with    Back Pain    Neck Pain       HPI:   Pain has worsened. On average, pain is perceived as mild (1-3  pain scale). Patient denies new numbness, new weakness, new tingling  Having neck and upper back tension and tightness today.  Since I last saw her, she did have hernia type surgery this was in 2023.  Did well, no complications reportedly.  She has been working as a nurse.  No other recent injuries.  No radiating pain to the extremities    Current Outpatient Medications:     ondansetron (ZOFRAN-ODT) 4 MG disintegrating tablet, Take 1 tablet by mouth every 8 hours as needed for Nausea or Vomiting, Disp: 20 tablet, Rfl: 1    citalopram (CELEXA) 40 MG tablet, Take 1 tablet by mouth daily, Disp: 30 tablet, Rfl: 5    atomoxetine (STRATTERA) 40 MG capsule, Take 1 capsule by mouth daily, Disp: 30 capsule, Rfl: 5    omeprazole (PRILOSEC) 20 MG delayed release capsule, Take 1 capsule by mouth 2 times daily (before meals), Disp: 60 capsule, Rfl: 0    sucralfate (CARAFATE) 1 GM tablet, Take 0.5 tablets by mouth 4 times daily, Disp: 60 tablet, Rfl: 1    acetaminophen (TYLENOL) 325 MG tablet, Take 2 tablets by mouth every 6 hours as needed for Pain, Disp: , Rfl:     azelastine (ASTELIN) 0.1 % nasal spray, 1 spray by Nasal route 2 times daily Use in each nostril as directed, Disp: 60 mL, Rfl: 1    busPIRone (BUSPAR) 5 MG tablet, TAKE ONE TABLET BY MOUTH 3 TIMES A DAY, Disp: 90 tablet, Rfl: 0    fluticasone (FLONASE) 50 MCG/ACT nasal spray, , Disp: , Rfl:     loratadine (CLARITIN) 10 MG tablet, , Disp: , Rfl:     vitamin D (ERGOCALCIFEROL) 1.25 MG (34825 UT) CAPS capsule, Take 1 capsule by mouth once a week, Disp: 12 capsule, Rfl: 0    Exam:   Vitals:    24 0837   BP: 122/72   Pulse: 79   Temp: 97.4 °F (36.3 °C)   SpO2: 98%     She appears well.  No apparent distress.  Alert and oriented x 3.  No midline

## 2024-02-07 ENCOUNTER — OFFICE VISIT (OUTPATIENT)
Dept: FAMILY MEDICINE CLINIC | Age: 39
End: 2024-02-07

## 2024-02-07 VITALS
HEART RATE: 86 BPM | TEMPERATURE: 98 F | OXYGEN SATURATION: 98 % | SYSTOLIC BLOOD PRESSURE: 110 MMHG | HEIGHT: 67 IN | WEIGHT: 218 LBS | BODY MASS INDEX: 34.21 KG/M2 | DIASTOLIC BLOOD PRESSURE: 66 MMHG

## 2024-02-07 DIAGNOSIS — R30.0 DYSURIA: Primary | ICD-10-CM

## 2024-02-07 LAB
BILIRUBIN, POC: NORMAL
BLOOD URINE, POC: NORMAL
CLARITY, POC: CLEAR
COLOR, POC: YELLOW
GLUCOSE URINE, POC: NORMAL
KETONES, POC: NORMAL
LEUKOCYTE EST, POC: NORMAL
NITRITE, POC: NORMAL
PH, POC: 6.5
PROTEIN, POC: NORMAL
SPECIFIC GRAVITY, POC: 1.02
UROBILINOGEN, POC: 0.2

## 2024-02-07 PROCEDURE — 81002 URINALYSIS NONAUTO W/O SCOPE: CPT | Performed by: FAMILY MEDICINE

## 2024-02-07 PROCEDURE — 99213 OFFICE O/P EST LOW 20 MIN: CPT | Performed by: FAMILY MEDICINE

## 2024-02-07 RX ORDER — FLUCONAZOLE 150 MG/1
150 TABLET ORAL
Qty: 2 TABLET | Refills: 0 | Status: SHIPPED | OUTPATIENT
Start: 2024-02-07 | End: 2024-02-13

## 2024-02-07 RX ORDER — CEFDINIR 300 MG/1
300 CAPSULE ORAL 2 TIMES DAILY
Qty: 14 CAPSULE | Refills: 0 | Status: SHIPPED | OUTPATIENT
Start: 2024-02-07 | End: 2024-02-14

## 2024-02-07 ASSESSMENT — PATIENT HEALTH QUESTIONNAIRE - PHQ9
2. FEELING DOWN, DEPRESSED OR HOPELESS: 0
4. FEELING TIRED OR HAVING LITTLE ENERGY: 0
8. MOVING OR SPEAKING SO SLOWLY THAT OTHER PEOPLE COULD HAVE NOTICED. OR THE OPPOSITE, BEING SO FIGETY OR RESTLESS THAT YOU HAVE BEEN MOVING AROUND A LOT MORE THAN USUAL: 0
5. POOR APPETITE OR OVEREATING: 0
1. LITTLE INTEREST OR PLEASURE IN DOING THINGS: 0
SUM OF ALL RESPONSES TO PHQ QUESTIONS 1-9: 0
SUM OF ALL RESPONSES TO PHQ9 QUESTIONS 1 & 2: 0
6. FEELING BAD ABOUT YOURSELF - OR THAT YOU ARE A FAILURE OR HAVE LET YOURSELF OR YOUR FAMILY DOWN: 0
7. TROUBLE CONCENTRATING ON THINGS, SUCH AS READING THE NEWSPAPER OR WATCHING TELEVISION: 0
SUM OF ALL RESPONSES TO PHQ QUESTIONS 1-9: 0
SUM OF ALL RESPONSES TO PHQ QUESTIONS 1-9: 0
9. THOUGHTS THAT YOU WOULD BE BETTER OFF DEAD, OR OF HURTING YOURSELF: 0
10. IF YOU CHECKED OFF ANY PROBLEMS, HOW DIFFICULT HAVE THESE PROBLEMS MADE IT FOR YOU TO DO YOUR WORK, TAKE CARE OF THINGS AT HOME, OR GET ALONG WITH OTHER PEOPLE: 0
3. TROUBLE FALLING OR STAYING ASLEEP: 0

## 2024-02-07 ASSESSMENT — ENCOUNTER SYMPTOMS
ABDOMINAL PAIN: 1
PHOTOPHOBIA: 0
SHORTNESS OF BREATH: 0
BACK PAIN: 0
DIARRHEA: 0
SORE THROAT: 0
COUGH: 0
BLOOD IN STOOL: 0
NAUSEA: 0
VOMITING: 0
CONSTIPATION: 0

## 2024-02-07 NOTE — PROGRESS NOTES
Zehra CLARK Randall Covert (:  1985) is a 38 y.o. female,Established patient, here for evaluation of the following chief complaint(s):  Dysuria and Urinary Frequency         ASSESSMENT/PLAN:  1. Dysuria  -     Culture, Urine; Future  -     POCT Urinalysis no Micro  -     cefdinir (OMNICEF) 300 MG capsule; Take 1 capsule by mouth 2 times daily for 7 days, Disp-14 capsule, R-0Normal  -     fluconazole (DIFLUCAN) 150 MG tablet; Take 1 tablet by mouth every 72 hours for 6 days, Disp-2 tablet, R-0Normal  At this time we will treat symptomatically.  Red flags discussed if these occur return to clinic or emergency department.  Patient voiced understanding.    No follow-ups on file.         Subjective   SUBJECTIVE/OBJECTIVE:  Dysuria   Associated symptoms include frequency. Pertinent negatives include no chills, hematuria, nausea, urgency or vomiting.   Urinary Frequency   Associated symptoms include frequency. Pertinent negatives include no chills, hematuria, nausea, urgency or vomiting.     Patient presents today for several day history of dysuria, urinary frequency, and abdominal pain.  Does have a history of kidney stones.  No flareup recently.  No fever or chills.  No nausea vomiting or diarrhea.  No recent trauma or injury.    Review of Systems   Constitutional:  Negative for chills and fever.   HENT:  Negative for congestion, hearing loss, nosebleeds and sore throat.    Eyes:  Negative for photophobia.   Respiratory:  Negative for cough and shortness of breath.    Cardiovascular:  Negative for chest pain, palpitations and leg swelling.   Gastrointestinal:  Positive for abdominal pain. Negative for blood in stool, constipation, diarrhea, nausea and vomiting.   Endocrine: Negative for polydipsia.   Genitourinary:  Positive for dysuria and frequency. Negative for hematuria and urgency.   Musculoskeletal:  Negative for back pain and myalgias.   Skin: Negative.    Neurological:  Negative for dizziness, tremors,

## 2024-02-12 LAB
CULTURE: ABNORMAL
SPECIMEN DESCRIPTION: ABNORMAL

## 2024-02-12 RX ORDER — NITROFURANTOIN 25; 75 MG/1; MG/1
100 CAPSULE ORAL 2 TIMES DAILY
Qty: 20 CAPSULE | Refills: 0 | Status: SHIPPED | OUTPATIENT
Start: 2024-02-12 | End: 2024-02-22

## 2024-04-30 ENCOUNTER — TELEPHONE (OUTPATIENT)
Dept: BARIATRICS/WEIGHT MGMT | Age: 39
End: 2024-04-30

## 2024-05-07 ENCOUNTER — TELEPHONE (OUTPATIENT)
Dept: BARIATRICS/WEIGHT MGMT | Age: 39
End: 2024-05-07

## 2024-05-16 NOTE — PATIENT INSTRUCTIONS
What is the next step to proceed with weight loss surgery? Please be aware that any co-pays or deductibles may be requested prior to testing and / or procedures. You will need to schedule a psychological evaluation for weight loss surgery. Patients will be required to complete all psychological testing as required by the mental health provider. Patients must also follow all of the provider's recommendations before weight loss surgery can be scheduled. The evaluation must be done a standard way for weight loss surgery. We strongly recommend that you contact one of our preferred providers listed below to arrange this:      [address], [facility]  [address] [facility], [address] [place] [address]    Dr. Concepcion Spain, PhD  [address]   [phone]    Dr. Elena Grace, PhD    [address] [place], [place]    [phone]      You will also need to plan on attending a 2 hour nutrition class at [facility] prior to your surgery. We will schedule this for you when we schedule your surgery. Please remember to have your labs drawn 10 days prior to your first scheduled dietary appointment. Please remember, that while we will submit your case to insurance for surgery authorization, it is your responsibility to know if your plan covers weight loss surgery and keep up-to-date with changes to your insurance coverage. We will do everything possible to help you get approved for weight loss surgery, but cannot guarantee an approval.     Please note that you will not be submitted to your insurance company until all pre-operative testing requirements are met. regular

## 2025-04-09 ENCOUNTER — TELEPHONE (OUTPATIENT)
Dept: BARIATRICS/WEIGHT MGMT | Age: 40
End: 2025-04-09

## (undated) DEVICE — BLADE,STAINLESS-STEEL,11,STRL,DISPOSABLE: Brand: MEDLINE

## (undated) DEVICE — COVER,LIGHT HANDLE,FLX,1/PK: Brand: MEDLINE INDUSTRIES, INC.

## (undated) DEVICE — PACK PROCEDURE SURG GEN CUST

## (undated) DEVICE — LUBRICANT SURG JELLY ST BACTER TUBE 4.25OZ

## (undated) DEVICE — GOWN,SIRUS,POLYRNF,BRTHSLV,2XL,18/CS: Brand: MEDLINE

## (undated) DEVICE — STOCKINETTE,DOUBLE PLY,6X48,STERILE: Brand: MEDLINE

## (undated) DEVICE — APPLICATOR MEDICATED 26 CC SOLUTION HI LT ORNG CHLORAPREP

## (undated) DEVICE — GOWN,SIRUS,NONRNF,SETINSLV,XL,20/CS: Brand: MEDLINE

## (undated) DEVICE — NEEDLE HYPO 25GA L1.5IN BLU POLYPR HUB S STL REG BVL STR

## (undated) DEVICE — SEALER/DIVIDER LAP SHFT L44CM DIA5MM STR BLNT TIP JAW HND

## (undated) DEVICE — TOWEL,OR,DSP,ST,BLUE,STD,6/PK,12PK/CS: Brand: MEDLINE

## (undated) DEVICE — CONTAINER SPEC COLL 960ML POLYPR TRIANG GRAD INTAKE/OUTPUT

## (undated) DEVICE — SOLUTION IV IRRIG WATER 1000ML POUR BRL 2F7114

## (undated) DEVICE — RELOAD STPL L60MM H1-2.6MM MESENTERY THN TISS WHT 6 ROW

## (undated) DEVICE — BLOCK BITE 60FR CAREGUARD

## (undated) DEVICE — INSTRUMENT RETRACTOR WEITLANER AMB REUSABLE

## (undated) DEVICE — IRON INTERN

## (undated) DEVICE — TROCAR: Brand: KII SLEEVE

## (undated) DEVICE — STAPLER SKIN L440MM 32MM LNG 12 FIRING B FRM PWR + GRIPPING

## (undated) DEVICE — TUBING, SUCTION, 1/4" X 10', STRAIGHT: Brand: MEDLINE

## (undated) DEVICE — GOWN,SIRUS,FABRNF,XL,20/CS: Brand: MEDLINE

## (undated) DEVICE — SYRINGE 20ML LL S/C 50

## (undated) DEVICE — LAPAROSCOPIC SCISSORS: Brand: EPIX LAPAROSCOPIC SCISSORS

## (undated) DEVICE — DRESSING PETRO W3XL8IN OIL EMUL N ADH GZ KNIT IMPREG CELOS

## (undated) DEVICE — TUBING SUCT 12FR MAL ALUM SHFT FN CAP VENT UNIV CONN W/ OBT

## (undated) DEVICE — BLADE ES ELASTOMERIC COAT INSUL DURABLE BEND UPTO 90DEG

## (undated) DEVICE — MASK,FACE,MAXFLUIDPROTECT,SHIELD/ERLPS: Brand: MEDLINE

## (undated) DEVICE — TROCAR: Brand: KII FIOS FIRST ENTRY

## (undated) DEVICE — INSTRUMENT SYSTEM 4 BATTERY REUSABLE

## (undated) DEVICE — Z DUP USE 2304025 K WIRE FIX L6IN DIA0.062IN [16001662] [MICRO AIRE SURGICAL INST]
Type: IMPLANTABLE DEVICE | Site: FOOT | Status: NON-FUNCTIONAL
Removed: 2020-12-21

## (undated) DEVICE — SUTURE ABSRB L6IN L37MM 0 GS-21 GRN 1/2 CIR TAPR PNT NDL VLOCL0306

## (undated) DEVICE — PADDING,UNDERCAST,COTTON, 4"X4YD STERILE: Brand: MEDLINE

## (undated) DEVICE — SYRINGE MED 10ML TRNSLUC BRL PLUNG BLK MRK POLYPR CTRL

## (undated) DEVICE — INSUFFLATION NEEDLE TO ESTABLISH PNEUMOPERITONEUM.: Brand: INSUFFLATION NEEDLE

## (undated) DEVICE — MARKER,SKIN,WI/RULER AND LABELS: Brand: MEDLINE

## (undated) DEVICE — SOLUTION IV IRRIG POUR BRL 0.9% SODIUM CHL 2F7124

## (undated) DEVICE — 4-PORT MANIFOLD: Brand: NEPTUNE 2

## (undated) DEVICE — TRAP,MUCUS SPECIMEN, 80CC: Brand: MEDLINE

## (undated) DEVICE — VALVE SUCTION AIR H2O HYDR H2O JET CONN STRL ORCA POD + DISP

## (undated) DEVICE — SPLINT OCL 2 PLSTR SPLNTING SYS 15 LAYR 5INX20FT

## (undated) DEVICE — SOLUTION IRRIG 250ML STRL H2O PLAS POUR BTL USP

## (undated) DEVICE — GARMENT,MEDLINE,DVT,INT,CALF,MED, GEN2: Brand: MEDLINE

## (undated) DEVICE — TROCAR ENDOPATH BASX BLDELSS W STBL SL 12MMX100MM DISPOSABLE

## (undated) DEVICE — KENDALL 450 SERIES MONITORING FOAM ELECTRODE - RECTANGULAR SHAPE ( 3/PK): Brand: KENDALL

## (undated) DEVICE — DRAPE 40INX24IN RADIOLOGY CASS EQUIP

## (undated) DEVICE — KIT BEDSIDE REVITAL OX 500ML

## (undated) DEVICE — SUTURE V-LOC 180 SZ 0 L9IN ABSRB GRN GS-21 L37MM 1/2 CIR VLOCL0346

## (undated) DEVICE — Z INACTIVE USE 2660664 SOLUTION IRRIG 3000ML 0.9% SOD CHL USP UROMATIC PLAS CONT

## (undated) DEVICE — PADDING CAST W6INXL4YD COT LO LINTING WYTEX

## (undated) DEVICE — MEDI-VAC NON-CONDUCTIVE SUCTION TUBING: Brand: CARDINAL HEALTH

## (undated) DEVICE — CONTAINER SPEC 480ML CLR POLYSTYR 10% NEUT BUFF FRMLN ZN

## (undated) DEVICE — PITCHER PT 1200ML W HNDL CSR WRP

## (undated) DEVICE — FORCEPS BX L240CM JAW DIA2.8MM L CAP W/ NDL MIC MESH TOOTH

## (undated) DEVICE — NDL CNTR 40CT FM MAG: Brand: MEDLINE INDUSTRIES, INC.

## (undated) DEVICE — SET ENDO INSTR LAPAROSCOPIC INCISIONAL

## (undated) DEVICE — DOUBLE BASIN SET: Brand: MEDLINE INDUSTRIES, INC.

## (undated) DEVICE — CONVERTORS STOCKINETTE: Brand: CONVERTORS

## (undated) DEVICE — [HIGH FLOW INSUFFLATOR,  DO NOT USE IF PACKAGE IS DAMAGED,  KEEP DRY,  KEEP AWAY FROM SUNLIGHT,  PROTECT FROM HEAT AND RADIOACTIVE SOURCES.]: Brand: PNEUMOSURE

## (undated) DEVICE — PACK SURG LAP CHOLE CUSTOM

## (undated) DEVICE — WIPES SKIN CLOTH READYPREP 9 X 10.5 IN 2% CHLORHEX GLUCONATE CHG PREOP

## (undated) DEVICE — PLUMEPORT ACTIV LAPAROSCOPIC SMOKE FILTRATION DEVICE: Brand: PLUMEPORT ACTIVE

## (undated) DEVICE — SET PSI

## (undated) DEVICE — CHLORAPREP 26ML ORANGE

## (undated) DEVICE — COVER,TABLE,44X90,STERILE: Brand: MEDLINE

## (undated) DEVICE — ELECTRODE PT RET AD L9FT HI MOIST COND ADH HYDRGEL CORDED

## (undated) DEVICE — NEEDLE SPNL 22GA L3.5IN BLK HUB S STL REG WALL FIT STYL W/

## (undated) DEVICE — 3M™ COBAN™ NL STERILE NON-LATEX SELF-ADHERENT WRAP, 2084S, 4 IN X 5 YD (10 CM X 4,5 M), 18 ROLLS/CASE: Brand: 3M™ COBAN™

## (undated) DEVICE — GOWN ISOLATN REG YEL M WT MULTIPLY SIDETIE LEV 2

## (undated) DEVICE — Device

## (undated) DEVICE — GLOVE ORANGE PI 7 1/2   MSG9075

## (undated) DEVICE — BASIC DOUBLE BASIN 2-LF: Brand: MEDLINE INDUSTRIES, INC.

## (undated) DEVICE — PLUMEPORT LAPAROSCOPIC SMOKE FILTRATION DEVICE: Brand: PLUMEPORT ACTIV

## (undated) DEVICE — INTENDED FOR TISSUE SEPARATION, AND OTHER PROCEDURES THAT REQUIRE A SHARP SURGICAL BLADE TO PUNCTURE OR CUT.: Brand: BARD-PARKER ® STAINLESS STEEL BLADES

## (undated) DEVICE — CAMERA STRYKER 1488 HD GEN

## (undated) DEVICE — PUMP SUC IRR TBNG L10FT W/ HNDPC ASSEMB STRYKEFLOW 2

## (undated) DEVICE — MEDI-VAC YANKAUER SUCTION HANDLE W/BULBOUS TIP: Brand: CARDINAL HEALTH

## (undated) DEVICE — LIQUIBAND RAPID ADHESIVE 36/CS 0.8ML: Brand: MEDLINE

## (undated) DEVICE — SPONGE GZ 4IN 4IN 4 PLY N WVN AVANT

## (undated) DEVICE — EXTRA LONG 45 DEGREE LENS

## (undated) DEVICE — SPONGE LAP W18XL18IN WHT COT 4 PLY FLD STRUNG RADPQ DISP ST 2 PER PACK

## (undated) DEVICE — GAUZE,SPONGE,4"X4",8PLY,STRL,LF,10/TRAY: Brand: MEDLINE

## (undated) DEVICE — DRAPE,EXTREMITY,89X128,STERILE: Brand: MEDLINE

## (undated) DEVICE — RETRACTOR SURG L9.25IN BLDE W18MM S STL SATIN FINISH HOHMN

## (undated) DEVICE — DRESSING ALG W2XL5IN ANTIMIC WND JUMPSTART

## (undated) DEVICE — PMI PTFE COATED LAPAROSCOPIC WIRE L-HOOK 44 CM: Brand: PMI

## (undated) DEVICE — KIT,ANTI FOG,W/SPONGE & FLUID,SOFT PACK: Brand: MEDLINE

## (undated) DEVICE — LAPAROSCOPIC WIRE L HK 45 CM

## (undated) DEVICE — YANKAUER,BULB TIP,W/O VENT,RIGID,STERILE: Brand: MEDLINE

## (undated) DEVICE — SHEARS LAP L45CM DIA5MM ULTRASONIC CRV TIP ADV HEMSTAS HARM

## (undated) DEVICE — BANDAGE,GAUZE,BULKEE II,4.5"X4.1YD,STRL: Brand: MEDLINE

## (undated) DEVICE — AIRLIFE™ ADULT OXYGEN MASK VINYL, UNDER THE CHIN STYLE, 3 IN 1 MASK WITH SAFETY VENT, WITH 7 FEET (2.1 M) CRUSH RESISTANT OXYGEN TUBING: Brand: AIRLIFE™

## (undated) DEVICE — TRAY DRILL SYSTEM 4 REUSABLE

## (undated) DEVICE — BNDG,ELSTC,MATRIX,STRL,4"X5YD,LF,HOOK&LP: Brand: MEDLINE

## (undated) DEVICE — 6 X 9  1.75MIL 4-WALL LABGUARD: Brand: MINIGRIP COMMERCIAL LLC

## (undated) DEVICE — RELOAD STPL L60MM H1.5-3.6MM REG TISS BLU GRIPPING SURF B